# Patient Record
Sex: MALE | Race: WHITE | Employment: OTHER | URBAN - METROPOLITAN AREA
[De-identification: names, ages, dates, MRNs, and addresses within clinical notes are randomized per-mention and may not be internally consistent; named-entity substitution may affect disease eponyms.]

---

## 2019-12-18 ENCOUNTER — TELEPHONE (OUTPATIENT)
Dept: PULMONOLOGY | Facility: CLINIC | Age: 59
End: 2019-12-18

## 2019-12-18 NOTE — TELEPHONE ENCOUNTER
Pt to be added to Dr. Rodriguez Po schedule w/in 1 wk for cough, had outside CT showing lung mass, will need to bring disc of outside CT to appt, & pt referred by Dr. David Khan (p. #[phone]). Sched pt on 12/23 9:45 am [facility]. Left pt msg to call back.

## 2019-12-18 NOTE — TELEPHONE ENCOUNTER
Pt declined appt scheduled on 12/23 since his insurance doesn't kick in until 1/1/20. Per Dr. Elex Pallas pt may be added to his schedule at anytime. Pt accepted appt 1/3 3:45 pm [place].  Instructed him to bring outside imaging including CT disc & insurance card/in

## 2019-12-20 NOTE — TELEPHONE ENCOUNTER
Pt now requesting appt on Mon. Resched pt 12/23 9:30 am [facility] per his request. Explained there may be wait as he is being added to MD's schedule. Reminded him to bring his outside imaging (disc). He voiced understanding. AR- FYI only.

## 2019-12-23 ENCOUNTER — OFFICE VISIT (OUTPATIENT)
Dept: PULMONOLOGY | Facility: CLINIC | Age: 59
End: 2019-12-23

## 2019-12-23 VITALS
BODY MASS INDEX: 23.92 KG/M2 | HEART RATE: 67 BPM | DIASTOLIC BLOOD PRESSURE: 71 MMHG | WEIGHT: 130 LBS | HEIGHT: 62 IN | SYSTOLIC BLOOD PRESSURE: 123 MMHG

## 2019-12-23 DIAGNOSIS — R91.8 LUNG MASS: Primary | ICD-10-CM

## 2019-12-23 PROCEDURE — 99244 OFF/OP CNSLTJ NEW/EST MOD 40: CPT | Performed by: INTERNAL MEDICINE

## 2019-12-23 NOTE — PROGRESS NOTES
[facility] MEDICAL OFFICE BUILDING, [facility], [facility]    Report of Consultation    Hany Pettit Patient Status:  Outpatient    7/10/1952 MRN [id number]   Location [address] [place] [address] kg).    HEENT : at , nc , anicteric sclera   Neck : no LAINA and no stridor   Chest : Symmetrical expansion with chest wall with good air exchange to both lung fields              Clear to auscultation bilaterally with no crackles or wheezes or rhonchi    He

## 2020-01-02 ENCOUNTER — TELEPHONE (OUTPATIENT)
Dept: PULMONOLOGY | Facility: CLINIC | Age: 60
End: 2020-01-02

## 2020-01-02 NOTE — TELEPHONE ENCOUNTER
No insurance on file to obtain PA. Pt states he has new insurance as of 1/1/20, [facility] PPO. Pt informed he will need to update [facility] [phone] on insurance information so they can submit the PA for the PET scan.   Pt informed he most likely will n

## 2020-01-07 NOTE — TELEPHONE ENCOUNTER
The  lung mass does not look resectable also patient had multiple other possible metastatic mediastinal  Lymphadenopathy . And likely advanced malignancy . The PET scan to determine the best site for biopsy .   Please give the patient permission to have t

## 2020-01-07 NOTE — TELEPHONE ENCOUNTER
Hello,     Patient health plan is requesting additional clinical  information in ordering to make a decision for PET CT. Health Plan is needing to know the following;  Is a resection planned instead of biopsy if PET confirms limited disease, Is the PET CT t

## 2020-01-08 NOTE — TELEPHONE ENCOUNTER
PET/CT  Has been approved and patient has been informed. Thank you, Agustin Us Specialist    Managed Care.

## 2020-01-09 ENCOUNTER — HOSPITAL ENCOUNTER (OUTPATIENT)
Dept: NUCLEAR MEDICINE | Facility: HOSPITAL | Age: 60
Discharge: HOME | End: 2020-01-09
Attending: INTERNAL MEDICINE
Payer: COMMERCIAL

## 2020-01-09 ENCOUNTER — TELEPHONE (OUTPATIENT)
Dept: PULMONOLOGY | Facility: CLINIC | Age: 60
End: 2020-01-09

## 2020-01-09 DIAGNOSIS — R91.8 LUNG MASS: ICD-10-CM

## 2020-01-09 LAB — GLUCOSE BLDC GLUCOMTR-MCNC: 109 MG/DL (ref 70–99)

## 2020-01-09 PROCEDURE — 82962 GLUCOSE BLOOD TEST: CPT

## 2020-01-09 PROCEDURE — 78815 PET IMAGE W/CT SKULL-THIGH: CPT | Performed by: INTERNAL MEDICINE

## 2020-01-09 NOTE — TELEPHONE ENCOUNTER
Patient has appointment tomorrow with Mp Manning and wants to ensure that Pet Scan results will be in prior to tomorrow office visit. Please call as soon as possible at:[phone],thanks.

## 2020-01-10 ENCOUNTER — TELEPHONE (OUTPATIENT)
Dept: PULMONOLOGY | Facility: CLINIC | Age: 60
End: 2020-01-10

## 2020-01-13 NOTE — TELEPHONE ENCOUNTER
Spoke with Joseph. 70 who states order was passed on to Zulay Bradley to schedule pt for CT Lung Biopsy. No further actions to be taken at this time.

## 2020-01-14 NOTE — TELEPHONE ENCOUNTER
I tried to call pt , no answer   Pt will need to have this biopsy ASAP , he has cancer most likely ,  and he needs biopsy or he can go to another clinic .

## 2020-01-14 NOTE — TELEPHONE ENCOUNTER
Lacie Scheuermann in Radiology states pt is refusing biopsy at this time and said he will call Lacie Scheuermann back when he is ready to schedule.

## 2020-01-20 NOTE — TELEPHONE ENCOUNTER
Dr. Merly Levi, please see pended letter, review and sign off if okay. Certified letter can be sent once you sign off. Below discussed with Supervisor Lyudmila Figueroa.

## 2020-01-20 NOTE — TELEPHONE ENCOUNTER
I tried again to call the patient several times with no answer  Please send a letter to the patient regarding the absolute need of the biopsy for his lung mass which is representing lung cancer most likely  If patient continues to decline biopsy to send ce

## 2020-01-29 ENCOUNTER — TELEPHONE (OUTPATIENT)
Dept: PULMONOLOGY | Facility: CLINIC | Age: 60
End: 2020-01-29

## 2020-01-29 NOTE — TELEPHONE ENCOUNTER
I tried to call few times last few days with no answer  By Friday if no respond I will sign the letter and send it to the patient  Thank you very much

## 2020-02-05 ENCOUNTER — TELEPHONE (OUTPATIENT)
Dept: PULMONOLOGY | Facility: CLINIC | Age: 60
End: 2020-02-05

## 2020-02-05 NOTE — TELEPHONE ENCOUNTER
Managed Care- please see message below. Please work on PA for CT and contact pt when approved.  Thank you

## 2020-02-05 NOTE — TELEPHONE ENCOUNTER
Sister states per insurance PA is needed for CT Biopsy. Pt is scheduled to have CT 2/10/20.  Please call [phone]

## 2020-02-06 NOTE — TELEPHONE ENCOUNTER
Mai Blackman in [facility] states pt is scheduled for biopsy 2/10/20 10:00 am.

## 2020-02-06 NOTE — TELEPHONE ENCOUNTER
Good Morning see referral [id number] requires not auth    Any questions please contact us at [phone]    Thanks    eva- [facility] Care

## 2020-02-06 NOTE — IMAGING NOTE
CT BIOPSY INSTRUCTIONS GIVEN PER TEMPLATE, Q&A DONE, AGREES W/POC.  REINFORCED ARRIVE 0900, NO NSAIDS, BLOOD THINNERS, NPO 6 HRS,NEED RIDE HOME

## 2020-02-07 RX ORDER — FLUMAZENIL 0.1 MG/ML
0.2 INJECTION, SOLUTION INTRAVENOUS AS NEEDED
Status: DISCONTINUED | OUTPATIENT
Start: 2020-02-07 | End: 2020-02-12

## 2020-02-07 RX ORDER — SODIUM CHLORIDE 9 MG/ML
INJECTION, SOLUTION INTRAVENOUS CONTINUOUS
Status: DISCONTINUED | OUTPATIENT
Start: 2020-02-07 | End: 2020-02-12

## 2020-02-07 RX ORDER — MIDAZOLAM HYDROCHLORIDE 1 MG/ML
1 INJECTION INTRAMUSCULAR; INTRAVENOUS EVERY 5 MIN PRN
Status: DISCONTINUED | OUTPATIENT
Start: 2020-02-07 | End: 2020-02-12

## 2020-02-07 RX ORDER — NALOXONE HYDROCHLORIDE 0.4 MG/ML
80 INJECTION, SOLUTION INTRAMUSCULAR; INTRAVENOUS; SUBCUTANEOUS AS NEEDED
Status: DISCONTINUED | OUTPATIENT
Start: 2020-02-07 | End: 2020-02-12

## 2020-02-07 NOTE — TELEPHONE ENCOUNTER
Pt informed of Dr. Siddiqi Guest message below. Pt states he has an appt with Dr. Jaja Alexis Thursday next week and will call this office back after this to schedule f/u appt.

## 2020-02-10 ENCOUNTER — HOSPITAL ENCOUNTER (OUTPATIENT)
Dept: CT IMAGING | Facility: HOSPITAL | Age: 60
Discharge: HOME | End: 2020-02-10
Attending: INTERNAL MEDICINE
Payer: COMMERCIAL

## 2020-02-10 ENCOUNTER — HOSPITAL ENCOUNTER (OUTPATIENT)
Dept: GENERAL RADIOLOGY | Facility: HOSPITAL | Age: 60
Discharge: HOME | End: 2020-02-10
Attending: RADIOLOGY
Payer: COMMERCIAL

## 2020-02-10 VITALS
WEIGHT: 130.06 LBS | SYSTOLIC BLOOD PRESSURE: 118 MMHG | BODY MASS INDEX: 23.93 KG/M2 | HEIGHT: 62 IN | HEART RATE: 84 BPM | DIASTOLIC BLOOD PRESSURE: 65 MMHG | RESPIRATION RATE: 23 BRPM | OXYGEN SATURATION: 98 %

## 2020-02-10 DIAGNOSIS — R91.8 LUNG MASS: ICD-10-CM

## 2020-02-10 LAB
DEPRECATED RDW RBC AUTO: 41.1 FL (ref 35.1–46.3)
ERYTHROCYTE [DISTWIDTH] IN BLOOD BY AUTOMATED COUNT: 12.4 % (ref 11–15)
HCT VFR BLD AUTO: 42.2 % (ref 39–53)
HGB BLD-MCNC: 14 G/DL (ref 13–17.5)
MCH RBC QN AUTO: 29.8 PG (ref 26–34)
MCHC RBC AUTO-ENTMCNC: 33.2 G/DL (ref 31–37)
MCV RBC AUTO: 89.8 FL (ref 80–100)
PLATELET # BLD AUTO: 249 10(3)UL (ref 150–450)
RBC # BLD AUTO: 4.7 X10(6)UL (ref 4.3–5.7)
WBC # BLD AUTO: 8.7 X10(3) UL (ref 4–11)

## 2020-02-10 PROCEDURE — 99153 MOD SED SAME PHYS/QHP EA: CPT | Performed by: INTERNAL MEDICINE

## 2020-02-10 PROCEDURE — 88360 TUMOR IMMUNOHISTOCHEM/MANUAL: CPT | Performed by: INTERNAL MEDICINE

## 2020-02-10 PROCEDURE — 99152 MOD SED SAME PHYS/QHP 5/>YRS: CPT | Performed by: INTERNAL MEDICINE

## 2020-02-10 PROCEDURE — 77012 CT SCAN FOR NEEDLE BIOPSY: CPT | Performed by: INTERNAL MEDICINE

## 2020-02-10 PROCEDURE — 88305 TISSUE EXAM BY PATHOLOGIST: CPT | Performed by: INTERNAL MEDICINE

## 2020-02-10 PROCEDURE — 71045 X-RAY EXAM CHEST 1 VIEW: CPT | Performed by: RADIOLOGY

## 2020-02-10 PROCEDURE — 88342 IMHCHEM/IMCYTCHM 1ST ANTB: CPT | Performed by: INTERNAL MEDICINE

## 2020-02-10 PROCEDURE — 88333 PATH CONSLTJ SURG CYTO XM 1: CPT | Performed by: INTERNAL MEDICINE

## 2020-02-10 PROCEDURE — 85027 COMPLETE CBC AUTOMATED: CPT | Performed by: RADIOLOGY

## 2020-02-10 PROCEDURE — 36415 COLL VENOUS BLD VENIPUNCTURE: CPT

## 2020-02-10 PROCEDURE — 32405 CT BIOPSY LUNG OR MEDIASTINUM (CPT=77012/32405): CPT | Performed by: INTERNAL MEDICINE

## 2020-02-10 PROCEDURE — 88341 IMHCHEM/IMCYTCHM EA ADD ANTB: CPT | Performed by: INTERNAL MEDICINE

## 2020-02-10 PROCEDURE — 88334 PATH CONSLTJ SURG CYTO XM EA: CPT | Performed by: INTERNAL MEDICINE

## 2020-02-10 RX ORDER — MIDAZOLAM HYDROCHLORIDE 1 MG/ML
INJECTION INTRAMUSCULAR; INTRAVENOUS
Status: DISCONTINUED
Start: 2020-02-10 | End: 2020-02-10

## 2020-02-10 RX ADMIN — MIDAZOLAM HYDROCHLORIDE 2 MG: 1 INJECTION INTRAMUSCULAR; INTRAVENOUS at 10:04:00

## 2020-02-10 RX ADMIN — SODIUM CHLORIDE: 9 INJECTION, SOLUTION INTRAVENOUS at 09:52:00

## 2020-02-10 NOTE — H&P
History & Physical Examination    Patient Name: Cyrus Lima   MRN: [id number]  [facility]: [id number]  YOB: 1960    Diagnosis: Left upper lobe mass    Present Illness: 57-year-old, ex-smoker, with left upper lobe mass on CT, with hilar and Era Quesada  2/10/2020  9:26 AM

## 2020-02-10 NOTE — IVS NOTE
S/P CT guided Left Upper Lobe Lung Mass Biopsy. Tolerated procedure well. Denies any complaint of pain and discomfort. Biopsy site clean, dry and intact. Portable chest xray taken 1 hour post procedure. No pneumothorax seen.  Patient then was given discharge

## 2020-02-12 ENCOUNTER — OFFICE VISIT (OUTPATIENT)
Dept: HEMATOLOGY/ONCOLOGY | Facility: HOSPITAL | Age: 60
End: 2020-02-12
Attending: INTERNAL MEDICINE
Payer: COMMERCIAL

## 2020-02-12 ENCOUNTER — TELEPHONE (OUTPATIENT)
Dept: HEMATOLOGY/ONCOLOGY | Facility: HOSPITAL | Age: 60
End: 2020-02-12

## 2020-02-12 ENCOUNTER — TELEPHONE (OUTPATIENT)
Dept: PULMONOLOGY | Facility: CLINIC | Age: 60
End: 2020-02-12

## 2020-02-12 VITALS
SYSTOLIC BLOOD PRESSURE: 131 MMHG | TEMPERATURE: 98 F | BODY MASS INDEX: 24.74 KG/M2 | OXYGEN SATURATION: 100 % | RESPIRATION RATE: 16 BRPM | WEIGHT: 126 LBS | HEIGHT: 60 IN | HEART RATE: 68 BPM | DIASTOLIC BLOOD PRESSURE: 69 MMHG

## 2020-02-12 DIAGNOSIS — C34.92 SMALL CELL CARCINOMA OF LEFT LUNG (HCC): Primary | ICD-10-CM

## 2020-02-12 DIAGNOSIS — C34.90 SMALL CELL LUNG CARCINOMA (HCC): Primary | ICD-10-CM

## 2020-02-12 PROCEDURE — 99215 OFFICE O/P EST HI 40 MIN: CPT | Performed by: INTERNAL MEDICINE

## 2020-02-12 NOTE — TELEPHONE ENCOUNTER
----- Message from Hilda Slaughter MD sent at 2/11/2020  2:43 PM CST -----  I spoke to the patient and discussed with him and informed him about his biopsy results which showed small cell lung carcinoma  Please refer the patient to oncology Dr. Alex Salazar

## 2020-02-13 ENCOUNTER — HOSPITAL ENCOUNTER (OUTPATIENT)
Dept: MRI IMAGING | Facility: HOSPITAL | Age: 60
Discharge: HOME | End: 2020-02-13
Attending: INTERNAL MEDICINE
Payer: COMMERCIAL

## 2020-02-13 DIAGNOSIS — C34.92 SMALL CELL CARCINOMA OF LEFT LUNG (HCC): ICD-10-CM

## 2020-02-13 LAB — CREAT BLD-MCNC: 0.9 MG/DL (ref 0.7–1.3)

## 2020-02-13 PROCEDURE — 82565 ASSAY OF CREATININE: CPT

## 2020-02-13 PROCEDURE — A9575 INJ GADOTERATE MEGLUMI 0.1ML: HCPCS | Performed by: INTERNAL MEDICINE

## 2020-02-13 PROCEDURE — 70553 MRI BRAIN STEM W/O & W/DYE: CPT | Performed by: INTERNAL MEDICINE

## 2020-02-13 NOTE — CONSULTS
[facility]    PATIENT'S NAME: Dayo Wilkerson   CONSULTING PHYSICIAN: Pecolia Mins. Gilford Gauze, MD   PATIENT ACCOUNT #: [de-identified] LOCATION: [address] [place] [address] RECORD #: [id number] YOB: 1960   CONSULTATION DATE: 02/12/2020       [facility] clear.   NECK:  Supple. LUNGS:  Symmetric expansion, nonlabored breathing. HEART:  Good distal pulses. Regular rate and rhythm. ABDOMEN:  Soft, nontender, nondistended. No masses. No organomegaly. EXTREMITIES:  No edema.    MUSCULOSKELETAL:  No def

## 2020-02-15 ENCOUNTER — HOSPITAL ENCOUNTER (OUTPATIENT)
Dept: CT IMAGING | Facility: HOSPITAL | Age: 60
Discharge: HOME | End: 2020-02-15
Attending: INTERNAL MEDICINE
Payer: COMMERCIAL

## 2020-02-15 DIAGNOSIS — C34.92 SMALL CELL CARCINOMA OF LEFT LUNG (HCC): ICD-10-CM

## 2020-02-15 LAB — CREAT BLD-MCNC: 1.1 MG/DL (ref 0.7–1.3)

## 2020-02-15 PROCEDURE — 74177 CT ABD & PELVIS W/CONTRAST: CPT | Performed by: INTERNAL MEDICINE

## 2020-02-15 PROCEDURE — 71260 CT THORAX DX C+: CPT | Performed by: INTERNAL MEDICINE

## 2020-02-15 PROCEDURE — 82565 ASSAY OF CREATININE: CPT

## 2020-02-17 ENCOUNTER — OFFICE VISIT (OUTPATIENT)
Dept: HEMATOLOGY/ONCOLOGY | Facility: HOSPITAL | Age: 60
End: 2020-02-17
Attending: NURSE PRACTITIONER
Payer: COMMERCIAL

## 2020-02-17 ENCOUNTER — NURSE ONLY (OUTPATIENT)
Dept: HEMATOLOGY/ONCOLOGY | Facility: HOSPITAL | Age: 60
End: 2020-02-17
Attending: INTERNAL MEDICINE
Payer: COMMERCIAL

## 2020-02-17 ENCOUNTER — TELEPHONE (OUTPATIENT)
Dept: HEMATOLOGY/ONCOLOGY | Facility: HOSPITAL | Age: 60
End: 2020-02-17

## 2020-02-17 DIAGNOSIS — C34.92 SMALL CELL CARCINOMA OF LEFT LUNG (HCC): Primary | ICD-10-CM

## 2020-02-17 DIAGNOSIS — Z01.89 ENCOUNTER FOR LABORATORY EXAMINATION: ICD-10-CM

## 2020-02-17 DIAGNOSIS — C34.92 SMALL CELL CARCINOMA OF LEFT LUNG (HCC): ICD-10-CM

## 2020-02-17 DIAGNOSIS — Z00.6 EVALUATED FOR CLINICAL TRIAL ENROLLMENT: ICD-10-CM

## 2020-02-17 DIAGNOSIS — Z71.9 ENCOUNTER FOR EDUCATION: ICD-10-CM

## 2020-02-17 LAB
ALBUMIN SERPL-MCNC: 3.5 G/DL (ref 3.4–5)
ALBUMIN/GLOB SERPL: 0.9 {RATIO} (ref 1–2)
ALP LIVER SERPL-CCNC: 94 U/L (ref 45–117)
ALT SERPL-CCNC: 26 U/L (ref 16–61)
ANION GAP SERPL CALC-SCNC: 4 MMOL/L (ref 0–18)
AST SERPL-CCNC: 17 U/L (ref 15–37)
BASOPHILS # BLD AUTO: 0.07 X10(3) UL (ref 0–0.2)
BASOPHILS NFR BLD AUTO: 0.8 %
BILIRUB SERPL-MCNC: 0.6 MG/DL (ref 0.1–2)
BUN BLD-MCNC: 14 MG/DL (ref 7–18)
BUN/CREAT SERPL: 13.6 (ref 10–20)
CALCIUM BLD-MCNC: 9.4 MG/DL (ref 8.5–10.1)
CHLORIDE SERPL-SCNC: 103 MMOL/L (ref 98–112)
CO2 SERPL-SCNC: 30 MMOL/L (ref 21–32)
CREAT BLD-MCNC: 1.03 MG/DL (ref 0.7–1.3)
DEPRECATED RDW RBC AUTO: 40.4 FL (ref 35.1–46.3)
EOSINOPHIL # BLD AUTO: 0.2 X10(3) UL (ref 0–0.7)
EOSINOPHIL NFR BLD AUTO: 2.4 %
ERYTHROCYTE [DISTWIDTH] IN BLOOD BY AUTOMATED COUNT: 12.3 % (ref 11–15)
GLOBULIN PLAS-MCNC: 3.9 G/DL (ref 2.8–4.4)
GLUCOSE BLD-MCNC: 138 MG/DL (ref 70–99)
HCT VFR BLD AUTO: 41.9 % (ref 39–53)
HGB BLD-MCNC: 13.7 G/DL (ref 13–17.5)
IMM GRANULOCYTES # BLD AUTO: 0.03 X10(3) UL (ref 0–1)
IMM GRANULOCYTES NFR BLD: 0.4 %
LYMPHOCYTES # BLD AUTO: 1.4 X10(3) UL (ref 1–4)
LYMPHOCYTES NFR BLD AUTO: 16.6 %
M PROTEIN MFR SERPL ELPH: 7.4 G/DL (ref 6.4–8.2)
MCH RBC QN AUTO: 29.3 PG (ref 26–34)
MCHC RBC AUTO-ENTMCNC: 32.7 G/DL (ref 31–37)
MCV RBC AUTO: 89.7 FL (ref 80–100)
MONOCYTES # BLD AUTO: 0.72 X10(3) UL (ref 0.1–1)
MONOCYTES NFR BLD AUTO: 8.5 %
NEUTROPHILS # BLD AUTO: 6.02 X10 (3) UL (ref 1.5–7.7)
NEUTROPHILS # BLD AUTO: 6.02 X10(3) UL (ref 1.5–7.7)
NEUTROPHILS NFR BLD AUTO: 71.3 %
OSMOLALITY SERPL CALC.SUM OF ELEC: 287 MOSM/KG (ref 275–295)
PLATELET # BLD AUTO: 277 10(3)UL (ref 150–450)
POTASSIUM SERPL-SCNC: 4.4 MMOL/L (ref 3.5–5.1)
RBC # BLD AUTO: 4.67 X10(6)UL (ref 4.3–5.7)
SODIUM SERPL-SCNC: 137 MMOL/L (ref 136–145)
WBC # BLD AUTO: 8.4 X10(3) UL (ref 4–11)

## 2020-02-17 PROCEDURE — 85025 COMPLETE CBC W/AUTO DIFF WBC: CPT

## 2020-02-17 PROCEDURE — 36415 COLL VENOUS BLD VENIPUNCTURE: CPT

## 2020-02-17 PROCEDURE — 99215 OFFICE O/P EST HI 40 MIN: CPT | Performed by: NURSE PRACTITIONER

## 2020-02-17 PROCEDURE — 80053 COMPREHEN METABOLIC PANEL: CPT

## 2020-02-17 RX ORDER — ONDANSETRON HYDROCHLORIDE 8 MG/1
8 TABLET, FILM COATED ORAL EVERY 8 HOURS PRN
Qty: 50 TABLET | Refills: 2 | Status: SHIPPED | OUTPATIENT
Start: 2020-02-17 | End: 2020-02-25

## 2020-02-17 RX ORDER — SODIUM CHLORIDE 9 MG/ML
1000 INJECTION, SOLUTION INTRAVENOUS ONCE
Status: CANCELLED | OUTPATIENT
Start: 2020-02-19

## 2020-02-17 RX ORDER — PROCHLORPERAZINE MALEATE 10 MG
10 TABLET ORAL EVERY 6 HOURS PRN
Qty: 50 TABLET | Refills: 2 | Status: SHIPPED | OUTPATIENT
Start: 2020-02-17 | End: 2020-02-25

## 2020-02-17 NOTE — PATIENT INSTRUCTIONS
Medication Education Record: IV Therapy    Learner:  Patient    Barriers / Limitations:  None    Psychosocial Assessment:  patient psychosocial response appropriate    Diagnosis:   Small Cell Lung Cancer, limited stage    IV Cancer Treatment Name(s): Cispl prevent and minimize this from occurring.     Recommended Anti-nausea medications (as directed by your provider):  Prochloperazine (Compazine) 10 mg every 6 hours and Ondansetron (Zofran) 8 mg every 8 hours  Take as needed    Helpful hints during cancer berna sunlight.  o Wear a broad-spectrum sunscreen with an SPF of 30 or higher on any skin exposed to the sun. Re-apply every 2 hours if in the sun and after bathing or sweating. o For dry skin, use an alcohol-free lotion twice per day, especially after baths. or your family member is receiving chemotherapy, whether in the clinic or at home, the following precautions must be taken to lessen any exposure to the medication. Home Intravenous (IV) Medication:  1.  Make sure the connections of your IV tubing are t though (if possible) they should not clean up any of your body fluids after you have treatment. 4. Sexual activity is safe while throughout treatment.   It is possible that traces of the oral chemotherapy may be present in vaginal fluid or semen for 48 ho

## 2020-02-17 NOTE — TELEPHONE ENCOUNTER
Daniel Hunt, Darren's sister, called asking to speak with Sabra Squireslatoya to see if there was a way to arrange it so that she may be able to listen in on his education appointment on the phone because she [place] out of state.  Please call

## 2020-02-17 NOTE — PROGRESS NOTES
Medication Education Record: IV Therapy    Learner:  Patient and sister via phone call (per patient consent)    Barriers / Limitations:  None    Psychosocial Assessment:  patient psychosocial response appropriate    Diagnosis:   Small Cell Lung Cancer, sanchez any of these symptoms occur during or after the infusion  Allergic reaction: there is a chance for allergic reaction with some medications.   If your prescribed therapy has a higher risk for this, steps will be taken to prevent and minimize this from occurr take 2 tabs (4mg) every 4 hours as needed. Maximum of 8 tablets in 24 hours. o If you have persistent diarrhea or constipation, please contact the triage nurse for further instructions.   Skin Care  o Avoid direct sunlight.  o Wear a broad-spectrum sun the following link if you are interested in additional information about chemotherapy for yourself or family members: http://www.rubi.info/. html      Safety and Handling of Chemotherapy  While you or your family member is receiving c Please make appropriate arrangements. 2. Hugging and kissing is safe for you and your partner or family members. You can visit, sit with, hug and kiss the children in your life.     3. You can be around pregnant women, though (if possible) they should no

## 2020-02-18 ENCOUNTER — RESEARCH ENCOUNTER (OUTPATIENT)
Dept: HEMATOLOGY/ONCOLOGY | Facility: HOSPITAL | Age: 60
End: 2020-02-18

## 2020-02-18 ENCOUNTER — NURSE ONLY (OUTPATIENT)
Dept: HEMATOLOGY/ONCOLOGY | Facility: HOSPITAL | Age: 60
End: 2020-02-18
Attending: INTERNAL MEDICINE
Payer: COMMERCIAL

## 2020-02-18 ENCOUNTER — TELEPHONE (OUTPATIENT)
Dept: HEMATOLOGY/ONCOLOGY | Facility: HOSPITAL | Age: 60
End: 2020-02-18

## 2020-02-18 VITALS
OXYGEN SATURATION: 99 % | DIASTOLIC BLOOD PRESSURE: 63 MMHG | SYSTOLIC BLOOD PRESSURE: 135 MMHG | TEMPERATURE: 98 F | RESPIRATION RATE: 16 BRPM | HEART RATE: 78 BPM

## 2020-02-18 DIAGNOSIS — C34.92 SMALL CELL CARCINOMA OF LEFT LUNG (HCC): Primary | ICD-10-CM

## 2020-02-18 PROCEDURE — 96366 THER/PROPH/DIAG IV INF ADDON: CPT

## 2020-02-18 PROCEDURE — 96361 HYDRATE IV INFUSION ADD-ON: CPT

## 2020-02-18 PROCEDURE — 96417 CHEMO IV INFUS EACH ADDL SEQ: CPT

## 2020-02-18 PROCEDURE — 96413 CHEMO IV INFUSION 1 HR: CPT

## 2020-02-18 PROCEDURE — 96367 TX/PROPH/DG ADDL SEQ IV INF: CPT

## 2020-02-18 RX ORDER — SODIUM CHLORIDE 9 MG/ML
1000 INJECTION, SOLUTION INTRAVENOUS ONCE
Status: COMPLETED | OUTPATIENT
Start: 2020-02-18 | End: 2020-02-18

## 2020-02-18 RX ADMIN — SODIUM CHLORIDE 1000 ML: 9 INJECTION, SOLUTION INTRAVENOUS at 12:02:00

## 2020-02-18 NOTE — PROGRESS NOTES
Pt here for C1D1 Cisplatin Etoposide. Arrives Ambulating independently, accompanied by Self           Patient reports possible pregnancy since last therapy cycle: Not Applicable    Modifications in dose or schedule: No. Consent noted in media tab.      PIV

## 2020-02-18 NOTE — TELEPHONE ENCOUNTER
Annabella Baeza called saying that Cosmo Robertson is no longer going to [facility] for a second opinion, so they do not need his biopsy disc sent there.

## 2020-02-18 NOTE — PATIENT INSTRUCTIONS
Recommended Anti-nausea medications (as directed by your provider):  Prochloperazine (Compazine) 10 mg every 6 hours and Ondansetron (Zofran) 8 mg every 8 hours (last administered via IV at 0830)  Take as needed     Helpful hints during cancer treatment: Care  · Avoid direct sunlight. · Wear a broad-spectrum sunscreen with an SPF of 30 or higher on any skin exposed to the sun. Re-apply every 2 hours if in the sun and after bathing or sweating.   · For dry skin, use an alcohol-free lotion twice per day, es of Chemotherapy  While you or your family member is receiving chemotherapy, whether in the clinic or at home, the following precautions must be taken to lessen any exposure to the medication. Handling Body Waste:   1.  Caregivers must wear gloves if exp can have harmful side effects to the fetus, especially in the first trimester.   In addition, menstrual cycles can become irregular during and after treatment, so you may not know if you are at a time in your cycle when you could become pregnant or if you a

## 2020-02-19 ENCOUNTER — RESEARCH ENCOUNTER (OUTPATIENT)
Dept: HEMATOLOGY/ONCOLOGY | Facility: HOSPITAL | Age: 60
End: 2020-02-19

## 2020-02-19 ENCOUNTER — OFFICE VISIT (OUTPATIENT)
Dept: HEMATOLOGY/ONCOLOGY | Facility: HOSPITAL | Age: 60
End: 2020-02-19
Attending: INTERNAL MEDICINE
Payer: COMMERCIAL

## 2020-02-19 ENCOUNTER — TELEPHONE (OUTPATIENT)
Dept: HEMATOLOGY/ONCOLOGY | Facility: HOSPITAL | Age: 60
End: 2020-02-19

## 2020-02-19 VITALS
OXYGEN SATURATION: 100 % | HEART RATE: 63 BPM | DIASTOLIC BLOOD PRESSURE: 65 MMHG | RESPIRATION RATE: 16 BRPM | TEMPERATURE: 98 F | SYSTOLIC BLOOD PRESSURE: 120 MMHG

## 2020-02-19 DIAGNOSIS — C34.92 SMALL CELL CARCINOMA OF LEFT LUNG (HCC): Primary | ICD-10-CM

## 2020-02-19 PROCEDURE — 96413 CHEMO IV INFUSION 1 HR: CPT

## 2020-02-19 PROCEDURE — 96375 TX/PRO/DX INJ NEW DRUG ADDON: CPT

## 2020-02-19 NOTE — PROGRESS NOTES
Study: Arya Weber  Potential candidate    Met with patient in infusion room to discuss interest in participation in clinical trial NRG-, Limited stage small cell lung cancer (LS-SCLC):  A Phase II/III randomized study of chemoradiation vs chemoradiation

## 2020-02-19 NOTE — PROGRESS NOTES
Pt here for C1D2 Etoposide. Arrives Ambulating independently, accompanied by Self           Patient reports possible pregnancy since last therapy cycle: Not Applicable    Modifications in dose or schedule: No.     Patient states he is feeling well today. Limitations:  Fatigue  Method:  Brief focused, Discussion, Printed material and Reinforcement  Outcome:  Shows understanding  Comments:

## 2020-02-20 ENCOUNTER — OFFICE VISIT (OUTPATIENT)
Dept: HEMATOLOGY/ONCOLOGY | Facility: HOSPITAL | Age: 60
End: 2020-02-20
Attending: INTERNAL MEDICINE
Payer: COMMERCIAL

## 2020-02-20 ENCOUNTER — RESEARCH ENCOUNTER (OUTPATIENT)
Dept: HEMATOLOGY/ONCOLOGY | Facility: HOSPITAL | Age: 60
End: 2020-02-20

## 2020-02-20 VITALS
SYSTOLIC BLOOD PRESSURE: 113 MMHG | HEART RATE: 56 BPM | TEMPERATURE: 97 F | DIASTOLIC BLOOD PRESSURE: 99 MMHG | RESPIRATION RATE: 16 BRPM

## 2020-02-20 DIAGNOSIS — Z01.89 ENCOUNTER FOR LABORATORY EXAMINATION: ICD-10-CM

## 2020-02-20 DIAGNOSIS — C34.92 SMALL CELL CARCINOMA OF LEFT LUNG (HCC): Primary | ICD-10-CM

## 2020-02-20 DIAGNOSIS — Z00.6 EVALUATED FOR CLINICAL TRIAL ENROLLMENT: Primary | ICD-10-CM

## 2020-02-20 PROCEDURE — 96367 TX/PROPH/DG ADDL SEQ IV INF: CPT

## 2020-02-20 PROCEDURE — 96413 CHEMO IV INFUSION 1 HR: CPT

## 2020-02-20 NOTE — PROGRESS NOTES
Pt here for C1D3 Etoposide.   Arrives Ambulating independently, accompanied by Self           Patient reports possible pregnancy since last therapy cycle: Not Applicable    Modifications in dose or schedule: No.     Patient states he is feeling \"ok\" today Printed material and Reinforcement  Outcome:  Shows understanding  Comments:

## 2020-02-20 NOTE — PROGRESS NOTES
STUDY:   Consent Note    Patient here for day 3 of pre-treatment cycle chemo. Reviewed study and addressed questions, concerns. Patient verbalized good understanding and willingness to participate. Signed consent, version date 11/26/2019 and HIPPA.

## 2020-02-21 ENCOUNTER — OFFICE VISIT (OUTPATIENT)
Dept: RADIATION ONCOLOGY | Facility: HOSPITAL | Age: 60
End: 2020-02-21
Attending: INTERNAL MEDICINE
Payer: COMMERCIAL

## 2020-02-21 ENCOUNTER — NURSE ONLY (OUTPATIENT)
Dept: HEMATOLOGY/ONCOLOGY | Facility: HOSPITAL | Age: 60
End: 2020-02-21
Attending: INTERNAL MEDICINE
Payer: COMMERCIAL

## 2020-02-21 ENCOUNTER — RESEARCH ENCOUNTER (OUTPATIENT)
Dept: HEMATOLOGY/ONCOLOGY | Facility: HOSPITAL | Age: 60
End: 2020-02-21

## 2020-02-21 VITALS
RESPIRATION RATE: 18 BRPM | BODY MASS INDEX: 25.36 KG/M2 | SYSTOLIC BLOOD PRESSURE: 104 MMHG | HEIGHT: 60 IN | DIASTOLIC BLOOD PRESSURE: 62 MMHG | TEMPERATURE: 97 F | WEIGHT: 129.19 LBS | HEART RATE: 72 BPM

## 2020-02-21 DIAGNOSIS — Z01.89 ENCOUNTER FOR LABORATORY EXAMINATION: ICD-10-CM

## 2020-02-21 DIAGNOSIS — Z00.6 EVALUATED FOR CLINICAL TRIAL ENROLLMENT: ICD-10-CM

## 2020-02-21 DIAGNOSIS — C34.92 SMALL CELL CARCINOMA OF LEFT LUNG (HCC): Primary | ICD-10-CM

## 2020-02-21 LAB
HBV CORE AB SERPL QL IA: NONREACTIVE
HBV SURFACE AB SER QL: NONREACTIVE
HBV SURFACE AB SERPL IA-ACNC: <3.1 MIU/ML
HBV SURFACE AG SER-ACNC: <0.1 [IU]/L
HBV SURFACE AG SERPL QL IA: NONREACTIVE
HCV AB SERPL QL IA: NONREACTIVE

## 2020-02-21 PROCEDURE — 86704 HEP B CORE ANTIBODY TOTAL: CPT

## 2020-02-21 PROCEDURE — 80500 HEPATITIS B PROFILE: CPT

## 2020-02-21 PROCEDURE — 87340 HEPATITIS B SURFACE AG IA: CPT

## 2020-02-21 PROCEDURE — 86803 HEPATITIS C AB TEST: CPT

## 2020-02-21 PROCEDURE — 99212 OFFICE O/P EST SF 10 MIN: CPT

## 2020-02-21 PROCEDURE — 86706 HEP B SURFACE ANTIBODY: CPT

## 2020-02-21 PROCEDURE — 36415 COLL VENOUS BLD VENIPUNCTURE: CPT

## 2020-02-21 NOTE — CONSULTS
[facility]    PATIENT'S NAME: Fam Schneider   RADIATION ONCOLOGIST: Kiel Garcia.  Anum Lyn MD   PATIENT ACCOUNT #: [de-identified] LOCATION: [address] [place] [address] RECORD #: [id number] YOB: 1960   CONSULTATION DATE: 02/21/2020 some patient-mediated delays, but ultimately a CT-guided biopsy of the lung mass took place on 02/10/2020. The pathology from this procedure demonstrated small-cell carcinoma.   The patient was referred to Dr. 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 He denies any transportation-related difficulties. REVIEW OF SYSTEMS:  A 14-point review of systems was performed. Pertinent positives and negatives are as per HPI.         PHYSICAL EXAMINATION:    GENERAL:  A 59 sense to enroll the patient on this trial.  From a radiotherapy standpoint, the patient will receive 6600 cGy in 33 fractions. This is done utilizing IMRT and IGRT as per standard NCCN approved treatment.     I had a long talk with the patient regarding th prophylactic cranial irradiation and I would gave 2500 cGy in 10 fractions. I did go through some of the side effects of this treatment in brief, but will discuss this more with the patient in significant detail if and when we reach that point.   He was ap

## 2020-02-21 NOTE — PROGRESS NOTES
Nursing Consultation Note  Patient: Rio Whitaker. YOB: 1960  Age: 62year old  Radiation Oncologist: Dr. Rogene Runner  Referring Physician: Kyle Quinones  Diagnosis:No diagnosis found.   Consult Date: 2/21/2020      Chemotherap [id number]  Phone: [phone] Fax: [phone]      Past Medical History:   Diagnosis Date   • Cancer [facility])     Lung    • Lung cancer ([facility] 01/2020   • Shortness of breath        History reviewed. No pertinent surgical history.     Social History    Soci medication refill/last taken today  Service: Not Asked        Blood Transfusions: Not Asked        Caffeine Concern: Yes          1 cup of coffee daily        Occupational Exposure: Not Asked        Hobby Hazards: Not Asked        Sleep Concern: Not Asked        Stress Concern: Not Discussion, Written and Handout  Barriers to learning:  None  Interventions to reduce barriers:  Consult, Face patient when speaking, Provide support/encouragement, Provide printed materials and Patient to express feelings  Visual aids:  yes  Hearing disab follow patient    Expected Outcomes:  Minimize weight loss  Knowledge of care plan    Progress Toward Outcome:  Making progress    Pamphlets/Handouts Given to Patient:  Nutritional information  Pt states his sister is a nutritionist.

## 2020-02-21 NOTE — PROGRESS NOTES
STUDY: NRG-  RANDOMIZATION NOTE    Patient had hepatitis labs drawn this am , then consult with radiation oncology. After reviewing all criteria, patient is eligible for study and therefore registered today.    Randomized to ARM 2- Concurrent Platinu

## 2020-02-24 ENCOUNTER — APPOINTMENT (OUTPATIENT)
Dept: RADIATION ONCOLOGY | Facility: HOSPITAL | Age: 60
End: 2020-02-24
Attending: INTERNAL MEDICINE
Payer: COMMERCIAL

## 2020-02-24 ENCOUNTER — HOSPITAL ENCOUNTER (OUTPATIENT)
Dept: RESPIRATORY THERAPY | Facility: HOSPITAL | Age: 60
Discharge: HOME | End: 2020-02-24
Attending: INTERNAL MEDICINE
Payer: COMMERCIAL

## 2020-02-24 DIAGNOSIS — C34.92 SMALL CELL CARCINOMA OF LEFT LUNG (HCC): ICD-10-CM

## 2020-02-24 PROCEDURE — 77399 UNLISTED PX MED RADJ PHYSICS: CPT | Performed by: RADIOLOGY

## 2020-02-24 PROCEDURE — 94060 EVALUATION OF WHEEZING: CPT | Performed by: INTERNAL MEDICINE

## 2020-02-24 PROCEDURE — 94729 DIFFUSING CAPACITY: CPT | Performed by: INTERNAL MEDICINE

## 2020-02-24 PROCEDURE — 77334 RADIATION TREATMENT AID(S): CPT | Performed by: RADIOLOGY

## 2020-02-24 PROCEDURE — 94726 PLETHYSMOGRAPHY LUNG VOLUMES: CPT | Performed by: INTERNAL MEDICINE

## 2020-02-24 PROCEDURE — 77470 SPECIAL RADIATION TREATMENT: CPT | Performed by: RADIOLOGY

## 2020-02-25 NOTE — PROCEDURES
[address]      1960 MRN [id number]   Height  610 Age 62year old   Weight  129 lbs  Sex Male         Spirometry:     FEV1 1.99 L   79 %   FEV1 / FVC 65 % which is reduced   No signi

## 2020-02-27 PROCEDURE — 77293 RESPIRATOR MOTION MGMT SIMUL: CPT | Performed by: RADIOLOGY

## 2020-02-27 PROCEDURE — 77338 DESIGN MLC DEVICE FOR IMRT: CPT | Performed by: RADIOLOGY

## 2020-02-27 PROCEDURE — 77301 RADIOTHERAPY DOSE PLAN IMRT: CPT | Performed by: RADIOLOGY

## 2020-02-27 PROCEDURE — 77300 RADIATION THERAPY DOSE PLAN: CPT | Performed by: RADIOLOGY

## 2020-02-28 ENCOUNTER — TELEPHONE (OUTPATIENT)
Dept: HEMATOLOGY/ONCOLOGY | Facility: HOSPITAL | Age: 60
End: 2020-02-28

## 2020-02-28 NOTE — TELEPHONE ENCOUNTER
Called Sandra Radford answered questions about port and exercise(he will get back to us on Monday on decision about having port and making those arrangements). He also was wondering about the suppliments he asked Jevon Lindsay about.  I let him know she is researching wi

## 2020-03-01 ENCOUNTER — APPOINTMENT (OUTPATIENT)
Dept: RADIATION ONCOLOGY | Facility: HOSPITAL | Age: 60
End: 2020-03-01
Attending: INTERNAL MEDICINE
Payer: COMMERCIAL

## 2020-03-02 ENCOUNTER — TELEPHONE (OUTPATIENT)
Dept: HEMATOLOGY/ONCOLOGY | Facility: HOSPITAL | Age: 60
End: 2020-03-02

## 2020-03-02 NOTE — TELEPHONE ENCOUNTER
Yaneth Coleman called and was looking to speak with somebody to go over getting his port placed he asked that he please get a call back to go over this.

## 2020-03-04 PROBLEM — Z45.2 ENCOUNTER FOR CARE RELATED TO VASCULAR ACCESS PORT: Status: ACTIVE | Noted: 2020-03-04

## 2020-03-05 ENCOUNTER — TELEPHONE (OUTPATIENT)
Dept: HEMATOLOGY/ONCOLOGY | Facility: HOSPITAL | Age: 60
End: 2020-03-05

## 2020-03-05 NOTE — TELEPHONE ENCOUNTER
LVM to schedule f/u    patient needs a follow up appointment on 3/17 and 3/24 with Noemy Yu, patient prefers late morning

## 2020-03-06 ENCOUNTER — HOSPITAL ENCOUNTER (OUTPATIENT)
Dept: INTERVENTIONAL RADIOLOGY/VASCULAR | Facility: HOSPITAL | Age: 60
Discharge: HOME | End: 2020-03-06
Attending: INTERNAL MEDICINE | Admitting: RADIOLOGY
Payer: COMMERCIAL

## 2020-03-06 VITALS
SYSTOLIC BLOOD PRESSURE: 94 MMHG | BODY MASS INDEX: 23.21 KG/M2 | OXYGEN SATURATION: 95 % | HEIGHT: 62 IN | HEART RATE: 63 BPM | WEIGHT: 126.13 LBS | DIASTOLIC BLOOD PRESSURE: 58 MMHG | RESPIRATION RATE: 16 BRPM

## 2020-03-06 DIAGNOSIS — C34.92 SMALL CELL CARCINOMA OF LEFT LUNG (HCC): ICD-10-CM

## 2020-03-06 PROCEDURE — 77001 FLUOROGUIDE FOR VEIN DEVICE: CPT

## 2020-03-06 PROCEDURE — 99152 MOD SED SAME PHYS/QHP 5/>YRS: CPT

## 2020-03-06 PROCEDURE — 36415 COLL VENOUS BLD VENIPUNCTURE: CPT

## 2020-03-06 PROCEDURE — 99153 MOD SED SAME PHYS/QHP EA: CPT

## 2020-03-06 PROCEDURE — 0JH60XZ INSERTION OF TUNNELED VASCULAR ACCESS DEVICE INTO CHEST SUBCUTANEOUS TISSUE AND FASCIA, OPEN APPROACH: ICD-10-PCS | Performed by: RADIOLOGY

## 2020-03-06 PROCEDURE — 02HV33Z INSERTION OF INFUSION DEVICE INTO SUPERIOR VENA CAVA, PERCUTANEOUS APPROACH: ICD-10-PCS | Performed by: RADIOLOGY

## 2020-03-06 PROCEDURE — B518ZZA FLUOROSCOPY OF SUPERIOR VENA CAVA, GUIDANCE: ICD-10-PCS | Performed by: RADIOLOGY

## 2020-03-06 PROCEDURE — 36561 INSERT TUNNELED CV CATH: CPT

## 2020-03-06 RX ORDER — BACITRACIN 50000 [USP'U]/1
INJECTION, POWDER, LYOPHILIZED, FOR SOLUTION INTRAMUSCULAR
Status: COMPLETED
Start: 2020-03-06 | End: 2020-03-06

## 2020-03-06 RX ORDER — LIDOCAINE HYDROCHLORIDE 20 MG/ML
INJECTION, SOLUTION EPIDURAL; INFILTRATION; INTRACAUDAL; PERINEURAL
Status: COMPLETED
Start: 2020-03-06 | End: 2020-03-06

## 2020-03-06 RX ORDER — CEFAZOLIN SODIUM/WATER 2 G/20 ML
SYRINGE (ML) INTRAVENOUS
Status: COMPLETED
Start: 2020-03-06 | End: 2020-03-06

## 2020-03-06 RX ORDER — SODIUM CHLORIDE 9 MG/ML
INJECTION, SOLUTION INTRAVENOUS CONTINUOUS
Status: DISCONTINUED | OUTPATIENT
Start: 2020-03-06 | End: 2020-03-06

## 2020-03-06 RX ORDER — MIDAZOLAM HYDROCHLORIDE 1 MG/ML
INJECTION INTRAMUSCULAR; INTRAVENOUS
Status: COMPLETED
Start: 2020-03-06 | End: 2020-03-06

## 2020-03-06 RX ORDER — HEPARIN SODIUM (PORCINE) LOCK FLUSH IV SOLN 100 UNIT/ML 100 UNIT/ML
SOLUTION INTRAVENOUS
Status: COMPLETED
Start: 2020-03-06 | End: 2020-03-06

## 2020-03-06 NOTE — INTERVAL H&P NOTE
The above referenced H&P was reviewed by Pretty Rao MD on 3/6/2020, the patient was examined and no significant changes have occurred in the patient's condition since the H&P was performed.   Risks, benefits, alternative treatments and consequences of no

## 2020-03-09 ENCOUNTER — NURSE ONLY (OUTPATIENT)
Dept: HEMATOLOGY/ONCOLOGY | Facility: HOSPITAL | Age: 60
End: 2020-03-09
Attending: INTERNAL MEDICINE
Payer: COMMERCIAL

## 2020-03-09 ENCOUNTER — TELEPHONE (OUTPATIENT)
Dept: HEMATOLOGY/ONCOLOGY | Facility: HOSPITAL | Age: 60
End: 2020-03-09

## 2020-03-09 VITALS
RESPIRATION RATE: 18 BRPM | HEART RATE: 61 BPM | DIASTOLIC BLOOD PRESSURE: 63 MMHG | TEMPERATURE: 98 F | SYSTOLIC BLOOD PRESSURE: 122 MMHG | HEIGHT: 60 IN | WEIGHT: 132.06 LBS | BODY MASS INDEX: 25.93 KG/M2 | OXYGEN SATURATION: 97 %

## 2020-03-09 DIAGNOSIS — Z51.11 CHEMOTHERAPY MANAGEMENT, ENCOUNTER FOR: ICD-10-CM

## 2020-03-09 DIAGNOSIS — C34.90 SMALL CELL LUNG CANCER IN ADULT (HCC): Primary | ICD-10-CM

## 2020-03-09 DIAGNOSIS — Z00.6 CLINICAL TRIAL EXAM: ICD-10-CM

## 2020-03-09 DIAGNOSIS — C34.90 SMALL CELL LUNG CANCER IN ADULT (HCC): ICD-10-CM

## 2020-03-09 DIAGNOSIS — D70.1 CHEMOTHERAPY INDUCED NEUTROPENIA (HCC): ICD-10-CM

## 2020-03-09 DIAGNOSIS — C34.92 SMALL CELL CARCINOMA OF LEFT LUNG (HCC): Primary | ICD-10-CM

## 2020-03-09 DIAGNOSIS — T45.1X5A CHEMOTHERAPY INDUCED NEUTROPENIA (HCC): ICD-10-CM

## 2020-03-09 LAB
ALBUMIN SERPL-MCNC: 3.4 G/DL (ref 3.4–5)
ALBUMIN/GLOB SERPL: 0.9 {RATIO} (ref 1–2)
ALP LIVER SERPL-CCNC: 107 U/L (ref 45–117)
ALT SERPL-CCNC: 36 U/L (ref 16–61)
ANION GAP SERPL CALC-SCNC: 6 MMOL/L (ref 0–18)
AST SERPL-CCNC: 20 U/L (ref 15–37)
BASOPHILS # BLD AUTO: 0.06 X10(3) UL (ref 0–0.2)
BASOPHILS NFR BLD AUTO: 1.7 %
BILIRUB SERPL-MCNC: 0.3 MG/DL (ref 0.1–2)
BUN BLD-MCNC: 15 MG/DL (ref 7–18)
BUN/CREAT SERPL: 14.4 (ref 10–20)
CALCIUM BLD-MCNC: 9.2 MG/DL (ref 8.5–10.1)
CHLORIDE SERPL-SCNC: 102 MMOL/L (ref 98–112)
CO2 SERPL-SCNC: 31 MMOL/L (ref 21–32)
CREAT BLD-MCNC: 1.04 MG/DL (ref 0.7–1.3)
DEPRECATED RDW RBC AUTO: 44.1 FL (ref 35.1–46.3)
EOSINOPHIL # BLD AUTO: 0.14 X10(3) UL (ref 0–0.7)
EOSINOPHIL NFR BLD AUTO: 4.1 %
ERYTHROCYTE [DISTWIDTH] IN BLOOD BY AUTOMATED COUNT: 13.3 % (ref 11–15)
GLOBULIN PLAS-MCNC: 3.8 G/DL (ref 2.8–4.4)
GLUCOSE BLD-MCNC: 147 MG/DL (ref 70–99)
HCT VFR BLD AUTO: 42 % (ref 39–53)
HGB BLD-MCNC: 13.5 G/DL (ref 13–17.5)
IMM GRANULOCYTES # BLD AUTO: 0.12 X10(3) UL (ref 0–1)
IMM GRANULOCYTES NFR BLD: 3.5 %
LYMPHOCYTES # BLD AUTO: 1.18 X10(3) UL (ref 1–4)
LYMPHOCYTES NFR BLD AUTO: 34.2 %
M PROTEIN MFR SERPL ELPH: 7.2 G/DL (ref 6.4–8.2)
MCH RBC QN AUTO: 29.6 PG (ref 26–34)
MCHC RBC AUTO-ENTMCNC: 32.1 G/DL (ref 31–37)
MCV RBC AUTO: 92.1 FL (ref 80–100)
MONOCYTES # BLD AUTO: 0.72 X10(3) UL (ref 0.1–1)
MONOCYTES NFR BLD AUTO: 20.9 %
NEUTROPHILS # BLD AUTO: 1.23 X10 (3) UL (ref 1.5–7.7)
NEUTROPHILS # BLD AUTO: 1.23 X10(3) UL (ref 1.5–7.7)
NEUTROPHILS NFR BLD AUTO: 35.6 %
OSMOLALITY SERPL CALC.SUM OF ELEC: 292 MOSM/KG (ref 275–295)
PATIENT FASTING Y/N/NP: NO
PLATELET # BLD AUTO: 335 10(3)UL (ref 150–450)
POTASSIUM SERPL-SCNC: 4.4 MMOL/L (ref 3.5–5.1)
RBC # BLD AUTO: 4.56 X10(6)UL (ref 4.3–5.7)
SODIUM SERPL-SCNC: 139 MMOL/L (ref 136–145)
TSI SER-ACNC: 2.37 MIU/ML (ref 0.36–3.74)
WBC # BLD AUTO: 3.5 X10(3) UL (ref 4–11)

## 2020-03-09 PROCEDURE — 84443 ASSAY THYROID STIM HORMONE: CPT

## 2020-03-09 PROCEDURE — 36415 COLL VENOUS BLD VENIPUNCTURE: CPT

## 2020-03-09 PROCEDURE — 99215 OFFICE O/P EST HI 40 MIN: CPT | Performed by: INTERNAL MEDICINE

## 2020-03-09 PROCEDURE — 80053 COMPREHEN METABOLIC PANEL: CPT

## 2020-03-09 PROCEDURE — 85025 COMPLETE CBC W/AUTO DIFF WBC: CPT

## 2020-03-09 RX ORDER — SODIUM CHLORIDE 9 MG/ML
1000 INJECTION, SOLUTION INTRAVENOUS ONCE
Status: CANCELLED | OUTPATIENT
Start: 2020-03-10

## 2020-03-09 NOTE — TELEPHONE ENCOUNTER
Kerry Shetty is calling for supplements and vitamins and minerals. Please call Glenny to discuss this matter.

## 2020-03-09 NOTE — TELEPHONE ENCOUNTER
Returned call to Darrell Overton reiterated what was discussed in the teaching today that Dr. Yoli Small recommends that patient stops all supplements and vitamins and minerals while he is under treatment. Darrell Overton stated understanding.

## 2020-03-09 NOTE — PROGRESS NOTES
[facility] Progress Note    Patient Name: Laurie Gonzalez YOB: 1960   Medical Record Number: [id number]   Attending Physician: Rajwinder Moran M.D.      Chief Complaint:  Small cell lung cancer    History of Present Illness:  Cancer h date: 2000        Years since quittin.1      Smokeless tobacco: Never Used    Substance and Sexual Activity      Alcohol use:  Yes        Alcohol/week: 7.0 standard drinks        Types: 7 Cans of beer per week        Frequency: 4 or more times a we chemo-infusion, 100 mg/m2 (Treatment Plan Recorded), Intravenous, Once, Kyle Bautista MD, Stopped at 02/19/20 0916  [COMPLETED] Fosaprepitant Dimeglumine (EMEND) 150 mg in sodium chloride 0.9% 150 mL IVPB, 150 mg, Intravenous, Once, Kyle Bautista MD, Sto palpable lymphadenopathy. Neck is supple. Lymphatics: There is no palpable peripheral lymphadenopathy   Chest: Symmetric expansion, nonlabored breathing  Cardiovascular: Regular with palpable distal pulses   Abdomen: Soft, non tender.    Extremities: No ed

## 2020-03-10 ENCOUNTER — OFFICE VISIT (OUTPATIENT)
Dept: RADIATION ONCOLOGY | Facility: HOSPITAL | Age: 60
End: 2020-03-10
Attending: RADIOLOGY
Payer: COMMERCIAL

## 2020-03-10 ENCOUNTER — NURSE ONLY (OUTPATIENT)
Dept: HEMATOLOGY/ONCOLOGY | Facility: HOSPITAL | Age: 60
End: 2020-03-10
Attending: INTERNAL MEDICINE
Payer: COMMERCIAL

## 2020-03-10 ENCOUNTER — RESEARCH ENCOUNTER (OUTPATIENT)
Dept: HEMATOLOGY/ONCOLOGY | Facility: HOSPITAL | Age: 60
End: 2020-03-10

## 2020-03-10 ENCOUNTER — APPOINTMENT (OUTPATIENT)
Dept: RADIATION ONCOLOGY | Facility: HOSPITAL | Age: 60
End: 2020-03-10
Attending: INTERNAL MEDICINE
Payer: COMMERCIAL

## 2020-03-10 VITALS
RESPIRATION RATE: 18 BRPM | TEMPERATURE: 98 F | OXYGEN SATURATION: 99 % | DIASTOLIC BLOOD PRESSURE: 60 MMHG | HEART RATE: 71 BPM | SYSTOLIC BLOOD PRESSURE: 115 MMHG

## 2020-03-10 DIAGNOSIS — C34.92 SMALL CELL CARCINOMA OF LEFT LUNG (HCC): Primary | ICD-10-CM

## 2020-03-10 DIAGNOSIS — Z45.2 ENCOUNTER FOR CARE RELATED TO VASCULAR ACCESS PORT: ICD-10-CM

## 2020-03-10 PROCEDURE — 96367 TX/PROPH/DG ADDL SEQ IV INF: CPT

## 2020-03-10 PROCEDURE — 77386 HC IMRT COMPLEX: CPT | Performed by: RADIOLOGY

## 2020-03-10 PROCEDURE — 96375 TX/PRO/DX INJ NEW DRUG ADDON: CPT

## 2020-03-10 PROCEDURE — 96366 THER/PROPH/DIAG IV INF ADDON: CPT

## 2020-03-10 PROCEDURE — 96413 CHEMO IV INFUSION 1 HR: CPT

## 2020-03-10 PROCEDURE — 96417 CHEMO IV INFUS EACH ADDL SEQ: CPT

## 2020-03-10 PROCEDURE — 96361 HYDRATE IV INFUSION ADD-ON: CPT

## 2020-03-10 RX ORDER — HEPARIN SODIUM (PORCINE) LOCK FLUSH IV SOLN 100 UNIT/ML 100 UNIT/ML
SOLUTION INTRAVENOUS
Status: COMPLETED
Start: 2020-03-10 | End: 2020-03-10

## 2020-03-10 RX ORDER — HEPARIN SODIUM (PORCINE) LOCK FLUSH IV SOLN 100 UNIT/ML 100 UNIT/ML
5 SOLUTION INTRAVENOUS ONCE
Status: CANCELLED | OUTPATIENT
Start: 2020-03-10

## 2020-03-10 RX ORDER — SODIUM CHLORIDE 9 MG/ML
1000 INJECTION, SOLUTION INTRAVENOUS ONCE
Status: COMPLETED | OUTPATIENT
Start: 2020-03-10 | End: 2020-03-10

## 2020-03-10 RX ORDER — HEPARIN SODIUM (PORCINE) LOCK FLUSH IV SOLN 100 UNIT/ML 100 UNIT/ML
5 SOLUTION INTRAVENOUS ONCE
Status: COMPLETED | OUTPATIENT
Start: 2020-03-10 | End: 2020-03-10

## 2020-03-10 RX ORDER — 0.9 % SODIUM CHLORIDE 0.9 %
VIAL (ML) INJECTION
Status: DISCONTINUED
Start: 2020-03-10 | End: 2020-03-10

## 2020-03-10 RX ORDER — 0.9 % SODIUM CHLORIDE 0.9 %
10 VIAL (ML) INJECTION ONCE
Status: CANCELLED | OUTPATIENT
Start: 2020-03-10

## 2020-03-10 RX ADMIN — HEPARIN SODIUM (PORCINE) LOCK FLUSH IV SOLN 100 UNIT/ML 500 UNITS: 100 SOLUTION INTRAVENOUS at 16:15:00

## 2020-03-10 RX ADMIN — SODIUM CHLORIDE 1000 ML: 9 INJECTION, SOLUTION INTRAVENOUS at 14:17:00

## 2020-03-10 NOTE — PROGRESS NOTES
[facility] Radiation Treatment Management Note 1-5    Patient:  Kathryn Boyce. Age:  62year old  Visit Diagnosis:    1.  Small cell carcinoma of left lung [facility])      Primary Rad/Onc:  Dr. Blanco Martin Alecia Bugress

## 2020-03-10 NOTE — PROGRESS NOTES
Pt here for research study: C1D1 tecentriq, cisplatin, and etoposide. Also has radiation. Had first dose today. Per research study instructions, vitals signs were within 60 minutes of infusion of atezolizumab, vitals every 15 minutes during, and vital sign understanding  Comments: reviewed plan of care for today, explained for study vitals signs during. Reviewed home safety after chemotherapy. Has port now, did not want to leave access.

## 2020-03-11 ENCOUNTER — OFFICE VISIT (OUTPATIENT)
Dept: HEMATOLOGY/ONCOLOGY | Facility: HOSPITAL | Age: 60
End: 2020-03-11
Attending: INTERNAL MEDICINE
Payer: COMMERCIAL

## 2020-03-11 VITALS
SYSTOLIC BLOOD PRESSURE: 129 MMHG | HEART RATE: 62 BPM | OXYGEN SATURATION: 97 % | RESPIRATION RATE: 18 BRPM | DIASTOLIC BLOOD PRESSURE: 58 MMHG | TEMPERATURE: 97 F

## 2020-03-11 DIAGNOSIS — Z45.2 ENCOUNTER FOR CARE RELATED TO VASCULAR ACCESS PORT: ICD-10-CM

## 2020-03-11 DIAGNOSIS — C34.92 SMALL CELL CARCINOMA OF LEFT LUNG (HCC): Primary | ICD-10-CM

## 2020-03-11 PROCEDURE — 96375 TX/PRO/DX INJ NEW DRUG ADDON: CPT

## 2020-03-11 PROCEDURE — 77386 HC IMRT COMPLEX: CPT | Performed by: RADIOLOGY

## 2020-03-11 PROCEDURE — 96413 CHEMO IV INFUSION 1 HR: CPT

## 2020-03-11 RX ORDER — 0.9 % SODIUM CHLORIDE 0.9 %
10 VIAL (ML) INJECTION ONCE
Status: CANCELLED | OUTPATIENT
Start: 2020-03-11

## 2020-03-11 RX ORDER — HEPARIN SODIUM (PORCINE) LOCK FLUSH IV SOLN 100 UNIT/ML 100 UNIT/ML
SOLUTION INTRAVENOUS
Status: COMPLETED
Start: 2020-03-11 | End: 2020-03-11

## 2020-03-11 RX ORDER — HEPARIN SODIUM (PORCINE) LOCK FLUSH IV SOLN 100 UNIT/ML 100 UNIT/ML
5 SOLUTION INTRAVENOUS ONCE
Status: COMPLETED | OUTPATIENT
Start: 2020-03-11 | End: 2020-03-11

## 2020-03-11 RX ORDER — HEPARIN SODIUM (PORCINE) LOCK FLUSH IV SOLN 100 UNIT/ML 100 UNIT/ML
5 SOLUTION INTRAVENOUS ONCE
Status: CANCELLED | OUTPATIENT
Start: 2020-03-11

## 2020-03-11 RX ORDER — 0.9 % SODIUM CHLORIDE 0.9 %
VIAL (ML) INJECTION
Status: DISCONTINUED
Start: 2020-03-11 | End: 2020-03-11

## 2020-03-11 RX ADMIN — HEPARIN SODIUM (PORCINE) LOCK FLUSH IV SOLN 100 UNIT/ML 500 UNITS: 100 SOLUTION INTRAVENOUS at 09:24:00

## 2020-03-12 ENCOUNTER — OFFICE VISIT (OUTPATIENT)
Dept: HEMATOLOGY/ONCOLOGY | Facility: HOSPITAL | Age: 60
End: 2020-03-12
Attending: INTERNAL MEDICINE
Payer: COMMERCIAL

## 2020-03-12 VITALS
RESPIRATION RATE: 16 BRPM | DIASTOLIC BLOOD PRESSURE: 58 MMHG | HEART RATE: 58 BPM | OXYGEN SATURATION: 100 % | SYSTOLIC BLOOD PRESSURE: 114 MMHG | TEMPERATURE: 98 F

## 2020-03-12 DIAGNOSIS — C34.92 SMALL CELL CARCINOMA OF LEFT LUNG (HCC): Primary | ICD-10-CM

## 2020-03-12 PROCEDURE — 96375 TX/PRO/DX INJ NEW DRUG ADDON: CPT

## 2020-03-12 PROCEDURE — 96413 CHEMO IV INFUSION 1 HR: CPT

## 2020-03-12 PROCEDURE — 77386 HC IMRT COMPLEX: CPT | Performed by: INTERNAL MEDICINE

## 2020-03-12 RX ORDER — HEPARIN SODIUM (PORCINE) LOCK FLUSH IV SOLN 100 UNIT/ML 100 UNIT/ML
SOLUTION INTRAVENOUS
Status: DISCONTINUED
Start: 2020-03-12 | End: 2020-03-12

## 2020-03-12 RX ORDER — 0.9 % SODIUM CHLORIDE 0.9 %
VIAL (ML) INJECTION
Status: DISCONTINUED
Start: 2020-03-12 | End: 2020-03-12

## 2020-03-12 NOTE — PROGRESS NOTES
Pt here for : C1D3 etoposide (research study)   RT today as well     Per note in tx plan \"MD visit with labs visit in 1 week.  (No onc MD/APN visit unless no Rad onc MD visit scheduled already)\"  Has already scheduled lab and exam with Dr. Sammie Caceres on 3/1

## 2020-03-13 ENCOUNTER — TELEPHONE (OUTPATIENT)
Dept: HEMATOLOGY/ONCOLOGY | Facility: HOSPITAL | Age: 60
End: 2020-03-13

## 2020-03-13 DIAGNOSIS — Z51.11 CHEMOTHERAPY MANAGEMENT, ENCOUNTER FOR: ICD-10-CM

## 2020-03-13 DIAGNOSIS — C34.92 SMALL CELL CARCINOMA OF LEFT LUNG (HCC): ICD-10-CM

## 2020-03-13 DIAGNOSIS — R12 HEARTBURN: Primary | ICD-10-CM

## 2020-03-13 RX ORDER — PANTOPRAZOLE SODIUM 40 MG/1
40 TABLET, DELAYED RELEASE ORAL
Qty: 30 TABLET | Refills: 1 | Status: SHIPPED | OUTPATIENT
Start: 2020-03-13 | End: 2021-02-04

## 2020-03-13 NOTE — TELEPHONE ENCOUNTER
I called Jose Redd and updated him that Edd Mason sent a prescription for pantoprazole. He should take it everyday in the morning before he eats or drinks. He should avoid spicy, greasy, deep fried foods & citrus fruits/juice.  He may take tums or Marisol Selte

## 2020-03-13 NOTE — TELEPHONE ENCOUNTER
Toxicities: C1 D1 Tecentriq/Cisplatin/Etoposide on 3/10/2020 & daily RT    Heartburn    Heartburn: (Pt ate pizza for dinner. She had terrible heartburn all night. He could not sleep. He tried taking tums & chewing peppermint gum.  No relief.)    Stacy Valiente wants

## 2020-03-14 ENCOUNTER — TELEPHONE (OUTPATIENT)
Dept: HEMATOLOGY/ONCOLOGY | Facility: HOSPITAL | Age: 60
End: 2020-03-14

## 2020-03-14 NOTE — TELEPHONE ENCOUNTER
Patient called due to dry cough and weakness. He states that he completed chemo/RT and that he did not go to RT yesterday because he feels weak. States no sick contacts.   He states that weakness has been going on for a few days, \"it maybe due to the

## 2020-03-16 ENCOUNTER — APPOINTMENT (OUTPATIENT)
Dept: HEMATOLOGY/ONCOLOGY | Facility: HOSPITAL | Age: 60
End: 2020-03-16
Attending: INTERNAL MEDICINE
Payer: COMMERCIAL

## 2020-03-16 PROCEDURE — 77386 HC IMRT COMPLEX: CPT | Performed by: RADIOLOGY

## 2020-03-17 ENCOUNTER — RESEARCH ENCOUNTER (OUTPATIENT)
Dept: HEMATOLOGY/ONCOLOGY | Facility: HOSPITAL | Age: 60
End: 2020-03-17

## 2020-03-17 ENCOUNTER — OFFICE VISIT (OUTPATIENT)
Dept: RADIATION ONCOLOGY | Facility: HOSPITAL | Age: 60
End: 2020-03-17
Attending: RADIOLOGY
Payer: COMMERCIAL

## 2020-03-17 ENCOUNTER — NURSE ONLY (OUTPATIENT)
Dept: HEMATOLOGY/ONCOLOGY | Facility: HOSPITAL | Age: 60
End: 2020-03-17
Attending: INTERNAL MEDICINE
Payer: COMMERCIAL

## 2020-03-17 VITALS
WEIGHT: 131.81 LBS | DIASTOLIC BLOOD PRESSURE: 64 MMHG | HEART RATE: 67 BPM | OXYGEN SATURATION: 100 % | SYSTOLIC BLOOD PRESSURE: 114 MMHG | BODY MASS INDEX: 26 KG/M2 | TEMPERATURE: 98 F | RESPIRATION RATE: 16 BRPM

## 2020-03-17 DIAGNOSIS — Z45.2 ENCOUNTER FOR CARE RELATED TO VASCULAR ACCESS PORT: ICD-10-CM

## 2020-03-17 DIAGNOSIS — C34.92 SMALL CELL CARCINOMA OF LEFT LUNG (HCC): Primary | ICD-10-CM

## 2020-03-17 LAB
ALBUMIN SERPL-MCNC: 3.5 G/DL (ref 3.4–5)
ALBUMIN/GLOB SERPL: 1.1 {RATIO} (ref 1–2)
ALP LIVER SERPL-CCNC: 96 U/L (ref 45–117)
ALT SERPL-CCNC: 33 U/L (ref 16–61)
ANION GAP SERPL CALC-SCNC: 3 MMOL/L (ref 0–18)
AST SERPL-CCNC: 13 U/L (ref 15–37)
BASOPHILS # BLD AUTO: 0.08 X10(3) UL (ref 0–0.2)
BASOPHILS NFR BLD AUTO: 1.3 %
BILIRUB SERPL-MCNC: 0.6 MG/DL (ref 0.1–2)
BUN BLD-MCNC: 24 MG/DL (ref 7–18)
BUN/CREAT SERPL: 25.3 (ref 10–20)
CALCIUM BLD-MCNC: 9 MG/DL (ref 8.5–10.1)
CHLORIDE SERPL-SCNC: 102 MMOL/L (ref 98–112)
CO2 SERPL-SCNC: 31 MMOL/L (ref 21–32)
CREAT BLD-MCNC: 0.95 MG/DL (ref 0.7–1.3)
DEPRECATED RDW RBC AUTO: 42 FL (ref 35.1–46.3)
EOSINOPHIL # BLD AUTO: 0.06 X10(3) UL (ref 0–0.7)
EOSINOPHIL NFR BLD AUTO: 1 %
ERYTHROCYTE [DISTWIDTH] IN BLOOD BY AUTOMATED COUNT: 13 % (ref 11–15)
GLOBULIN PLAS-MCNC: 3.3 G/DL (ref 2.8–4.4)
GLUCOSE BLD-MCNC: 108 MG/DL (ref 70–99)
HCT VFR BLD AUTO: 37.7 % (ref 39–53)
HGB BLD-MCNC: 12.7 G/DL (ref 13–17.5)
IMM GRANULOCYTES # BLD AUTO: 0.13 X10(3) UL (ref 0–1)
IMM GRANULOCYTES NFR BLD: 2.1 %
LYMPHOCYTES # BLD AUTO: 0.64 X10(3) UL (ref 1–4)
LYMPHOCYTES NFR BLD AUTO: 10.2 %
M PROTEIN MFR SERPL ELPH: 6.8 G/DL (ref 6.4–8.2)
MCH RBC QN AUTO: 30 PG (ref 26–34)
MCHC RBC AUTO-ENTMCNC: 33.7 G/DL (ref 31–37)
MCV RBC AUTO: 88.9 FL (ref 80–100)
MONOCYTES # BLD AUTO: 0.16 X10(3) UL (ref 0.1–1)
MONOCYTES NFR BLD AUTO: 2.6 %
NEUTROPHILS # BLD AUTO: 5.18 X10 (3) UL (ref 1.5–7.7)
NEUTROPHILS # BLD AUTO: 5.18 X10(3) UL (ref 1.5–7.7)
NEUTROPHILS NFR BLD AUTO: 82.8 %
OSMOLALITY SERPL CALC.SUM OF ELEC: 287 MOSM/KG (ref 275–295)
PLATELET # BLD AUTO: 166 10(3)UL (ref 150–450)
POTASSIUM SERPL-SCNC: 4.2 MMOL/L (ref 3.5–5.1)
RBC # BLD AUTO: 4.24 X10(6)UL (ref 4.3–5.7)
SODIUM SERPL-SCNC: 136 MMOL/L (ref 136–145)
WBC # BLD AUTO: 6.3 X10(3) UL (ref 4–11)

## 2020-03-17 PROCEDURE — 77386 HC IMRT COMPLEX: CPT | Performed by: RADIOLOGY

## 2020-03-17 PROCEDURE — 80053 COMPREHEN METABOLIC PANEL: CPT

## 2020-03-17 PROCEDURE — 36591 DRAW BLOOD OFF VENOUS DEVICE: CPT

## 2020-03-17 PROCEDURE — 85025 COMPLETE CBC W/AUTO DIFF WBC: CPT

## 2020-03-17 RX ORDER — HEPARIN SODIUM (PORCINE) LOCK FLUSH IV SOLN 100 UNIT/ML 100 UNIT/ML
5 SOLUTION INTRAVENOUS ONCE
Status: COMPLETED | OUTPATIENT
Start: 2020-03-17 | End: 2020-03-17

## 2020-03-17 RX ORDER — HEPARIN SODIUM (PORCINE) LOCK FLUSH IV SOLN 100 UNIT/ML 100 UNIT/ML
5 SOLUTION INTRAVENOUS ONCE
Status: CANCELLED | OUTPATIENT
Start: 2020-03-17

## 2020-03-17 RX ORDER — ONDANSETRON 4 MG/1
4 TABLET, ORALLY DISINTEGRATING ORAL EVERY 8 HOURS PRN
COMMUNITY
End: 2020-08-03

## 2020-03-17 RX ORDER — 0.9 % SODIUM CHLORIDE 0.9 %
VIAL (ML) INJECTION
Status: DISCONTINUED
Start: 2020-03-17 | End: 2020-03-17

## 2020-03-17 RX ORDER — 0.9 % SODIUM CHLORIDE 0.9 %
10 VIAL (ML) INJECTION ONCE
Status: CANCELLED | OUTPATIENT
Start: 2020-03-17

## 2020-03-17 RX ORDER — PROCHLORPERAZINE MALEATE 10 MG
10 TABLET ORAL EVERY 6 HOURS PRN
COMMUNITY
End: 2020-08-03

## 2020-03-17 RX ADMIN — HEPARIN SODIUM (PORCINE) LOCK FLUSH IV SOLN 100 UNIT/ML 500 UNITS: 100 SOLUTION INTRAVENOUS at 08:54:00

## 2020-03-17 NOTE — PROGRESS NOTES
[facility] Radiation Treatment Management Note 1-5    Patient:  Ivonne Mae. Age:  62year old  Visit Diagnosis:    1.  Small cell carcinoma of left lung [facility])      Primary Rad/Onc:  Dr. Fam Squires carlos Monterroso

## 2020-03-18 PROCEDURE — 77386 HC IMRT COMPLEX: CPT | Performed by: RADIOLOGY

## 2020-03-19 ENCOUNTER — DIETICIAN VISIT (OUTPATIENT)
Dept: NUTRITION | Facility: HOSPITAL | Age: 60
End: 2020-03-19

## 2020-03-19 VITALS — BODY MASS INDEX: 26 KG/M2 | WEIGHT: 132.63 LBS

## 2020-03-19 PROCEDURE — 77386 HC IMRT COMPLEX: CPT | Performed by: RADIOLOGY

## 2020-03-19 NOTE — PROGRESS NOTES
Oncology Nutrition Assessment    Ht Readings from Last 1 Encounters:  03/09/20 : 152.4 cm (5')      Wt Readings from Last 1 Encounters:  03/19/20 : 60.1 kg (132 lb 9.6 oz)    BMI Calculated: Body mass index is 25.9 kg/m². Weight History:   Wt Readings fro

## 2020-03-20 PROCEDURE — 77386 HC IMRT COMPLEX: CPT | Performed by: RADIOLOGY

## 2020-03-20 PROCEDURE — 77336 RADIATION PHYSICS CONSULT: CPT | Performed by: RADIOLOGY

## 2020-03-23 PROCEDURE — 77386 HC IMRT COMPLEX: CPT | Performed by: RADIOLOGY

## 2020-03-23 NOTE — PROGRESS NOTES
Study: [facility]   Patient YY:--[id number]  Treatment Arm: Arm 2   Timepoint: C1D8    Patient here for C1D8. Patient arrived late to appointment, labs drawn per protocol, evaluated by Dr. Oli Morrell. Glucose elevated but non-fasting.   Patient refused q

## 2020-03-23 NOTE — PROGRESS NOTES
Study: NRG   Patient ID: --[id number]  Treatment Arm: Arm 2  Timepoint: C1D1    Patient here for C1D1 on [address]. Patient evaluated by Dr. Francis Arteaga on 3/9/20, labs drawn and reviewed on 3/9/20 and OK to proceed with treatment.   Questionnaires comp

## 2020-03-24 ENCOUNTER — RESEARCH ENCOUNTER (OUTPATIENT)
Dept: HEMATOLOGY/ONCOLOGY | Facility: HOSPITAL | Age: 60
End: 2020-03-24

## 2020-03-24 ENCOUNTER — APPOINTMENT (OUTPATIENT)
Dept: HEMATOLOGY/ONCOLOGY | Facility: HOSPITAL | Age: 60
End: 2020-03-24
Attending: INTERNAL MEDICINE
Payer: COMMERCIAL

## 2020-03-24 ENCOUNTER — TELEPHONE (OUTPATIENT)
Dept: HEMATOLOGY/ONCOLOGY | Facility: HOSPITAL | Age: 60
End: 2020-03-24

## 2020-03-24 ENCOUNTER — OFFICE VISIT (OUTPATIENT)
Dept: RADIATION ONCOLOGY | Facility: HOSPITAL | Age: 60
End: 2020-03-24
Attending: RADIOLOGY
Payer: COMMERCIAL

## 2020-03-24 VITALS
DIASTOLIC BLOOD PRESSURE: 70 MMHG | HEIGHT: 60 IN | RESPIRATION RATE: 16 BRPM | SYSTOLIC BLOOD PRESSURE: 108 MMHG | WEIGHT: 135.19 LBS | BODY MASS INDEX: 26.54 KG/M2 | HEART RATE: 67 BPM | TEMPERATURE: 98 F

## 2020-03-24 DIAGNOSIS — C34.92 SMALL CELL CARCINOMA OF LEFT LUNG (HCC): Primary | ICD-10-CM

## 2020-03-24 LAB
ALBUMIN SERPL-MCNC: 3.3 G/DL (ref 3.4–5)
ALBUMIN/GLOB SERPL: 0.9 {RATIO} (ref 1–2)
ALP LIVER SERPL-CCNC: 93 U/L (ref 45–117)
ALT SERPL-CCNC: 23 U/L (ref 16–61)
ANION GAP SERPL CALC-SCNC: 4 MMOL/L (ref 0–18)
AST SERPL-CCNC: 11 U/L (ref 15–37)
BASOPHILS # BLD AUTO: 0.04 X10(3) UL (ref 0–0.2)
BASOPHILS NFR BLD AUTO: 2.1 %
BILIRUB SERPL-MCNC: 0.4 MG/DL (ref 0.1–2)
BUN BLD-MCNC: 22 MG/DL (ref 7–18)
BUN/CREAT SERPL: 22.9 (ref 10–20)
CALCIUM BLD-MCNC: 9.1 MG/DL (ref 8.5–10.1)
CHLORIDE SERPL-SCNC: 104 MMOL/L (ref 98–112)
CO2 SERPL-SCNC: 31 MMOL/L (ref 21–32)
CREAT BLD-MCNC: 0.96 MG/DL (ref 0.7–1.3)
DEPRECATED RDW RBC AUTO: 43.3 FL (ref 35.1–46.3)
EOSINOPHIL # BLD AUTO: 0.09 X10(3) UL (ref 0–0.7)
EOSINOPHIL NFR BLD AUTO: 4.6 %
ERYTHROCYTE [DISTWIDTH] IN BLOOD BY AUTOMATED COUNT: 13.4 % (ref 11–15)
GLOBULIN PLAS-MCNC: 3.6 G/DL (ref 2.8–4.4)
GLUCOSE BLD-MCNC: 111 MG/DL (ref 70–99)
HCT VFR BLD AUTO: 37.6 % (ref 39–53)
HGB BLD-MCNC: 12.4 G/DL (ref 13–17.5)
IMM GRANULOCYTES # BLD AUTO: 0.01 X10(3) UL (ref 0–1)
IMM GRANULOCYTES NFR BLD: 0.5 %
LYMPHOCYTES # BLD AUTO: 0.58 X10(3) UL (ref 1–4)
LYMPHOCYTES NFR BLD AUTO: 29.9 %
M PROTEIN MFR SERPL ELPH: 6.9 G/DL (ref 6.4–8.2)
MCH RBC QN AUTO: 30 PG (ref 26–34)
MCHC RBC AUTO-ENTMCNC: 33 G/DL (ref 31–37)
MCV RBC AUTO: 91 FL (ref 80–100)
MONOCYTES # BLD AUTO: 0.36 X10(3) UL (ref 0.1–1)
MONOCYTES NFR BLD AUTO: 18.6 %
NEUTROPHILS # BLD AUTO: 0.86 X10 (3) UL (ref 1.5–7.7)
NEUTROPHILS # BLD AUTO: 0.86 X10(3) UL (ref 1.5–7.7)
NEUTROPHILS NFR BLD AUTO: 44.3 %
OSMOLALITY SERPL CALC.SUM OF ELEC: 292 MOSM/KG (ref 275–295)
PATIENT FASTING Y/N/NP: NO
PLATELET # BLD AUTO: 136 10(3)UL (ref 150–450)
POTASSIUM SERPL-SCNC: 5.3 MMOL/L (ref 3.5–5.1)
RBC # BLD AUTO: 4.13 X10(6)UL (ref 4.3–5.7)
SODIUM SERPL-SCNC: 139 MMOL/L (ref 136–145)
WBC # BLD AUTO: 1.9 X10(3) UL (ref 4–11)

## 2020-03-24 PROCEDURE — 36415 COLL VENOUS BLD VENIPUNCTURE: CPT

## 2020-03-24 PROCEDURE — 85025 COMPLETE CBC W/AUTO DIFF WBC: CPT

## 2020-03-24 PROCEDURE — 80053 COMPREHEN METABOLIC PANEL: CPT

## 2020-03-24 PROCEDURE — 77386 HC IMRT COMPLEX: CPT | Performed by: RADIOLOGY

## 2020-03-24 RX ORDER — 0.9 % SODIUM CHLORIDE 0.9 %
VIAL (ML) INJECTION
Status: DISCONTINUED
Start: 2020-03-24 | End: 2020-03-24 | Stop reason: WASHOUT

## 2020-03-24 NOTE — TELEPHONE ENCOUNTER
Matthew Ingram called and was looking to go over Camb recent blood work with a nurse. She stated that he told her that his potassium was low and she wanted to know how low it was. She asked that she please get a call back.

## 2020-03-24 NOTE — PROGRESS NOTES
Study: NRG   Patient GN:--[id number]  Treatment Arm: Arm 2   Timepoint: C1D15    Patient here for C1D15. Labs drawn per protocol. K elevated to 5.3, . Dr. Jamal Emanuel notified.   Patient educated to avoid foods high in potassium including, bana

## 2020-03-24 NOTE — PROGRESS NOTES
[place] [address]  Radiation Oncology New Consultation      Patient Name: Jeyson Azar.    MRN: [id number]  PCP: Forrest Lopez MD  Referring Physician: Tula Severs, MD    Diagnosis: limited stage SCLC of the left lung    HPI: The patient is a bowel disease.      Physical Exam   20  0841   BP: 108/70   Pulse: 67   Resp: 16   Temp: 97.7 °F (36.5 °C)     ECO    Gen: NAD  CV: RRR  Lungs: CTAB, no wheezes  Skin: papular erythematous rash noted on posterior neck and scalp, no sign of infecti

## 2020-03-24 NOTE — PROGRESS NOTES
[facility] Radiation Treatment Management Note 6-10    Patient:  Deborah Medrano. Age:  62year old  Visit Diagnosis:    1.  Small cell carcinoma of left lung [facility])      Primary Rad/Onc:  Dr. Luz Maria Unger kg (135 lb 3.2 oz)  03/19/20 : 60.1 kg (132 lb 9.6 oz)  03/17/20 : 59.8 kg (131 lb 12.8 oz)  03/09/20 : 59.9 kg (132 lb 0.9 oz)  03/02/20 : 57.2 kg (126 lb 1.7 oz)  02/21/20 : 58.6 kg (129 lb 3.2 oz)    Pablo Walsh RN    Physician Note:  Subjective:  -Loletta Asa

## 2020-03-25 PROCEDURE — 77386 HC IMRT COMPLEX: CPT | Performed by: RADIOLOGY

## 2020-03-26 ENCOUNTER — DIETICIAN VISIT (OUTPATIENT)
Dept: NUTRITION | Facility: HOSPITAL | Age: 60
End: 2020-03-26

## 2020-03-26 VITALS — BODY MASS INDEX: 27 KG/M2 | WEIGHT: 136.19 LBS

## 2020-03-26 PROCEDURE — 77386 HC IMRT COMPLEX: CPT | Performed by: RADIOLOGY

## 2020-03-26 NOTE — PROGRESS NOTES
Oncology Nutrition Assessment    Ht Readings from Last 1 Encounters:  03/24/20 : 152.4 cm (5')      Wt Readings from Last 1 Encounters:  03/26/20 : 61.8 kg (136 lb 3.2 oz)    BMI Calculated: Body mass index is 26.6 kg/m². Weight History:   Wt Readings fro

## 2020-03-27 PROCEDURE — 77386 HC IMRT COMPLEX: CPT | Performed by: RADIOLOGY

## 2020-03-27 PROCEDURE — 77336 RADIATION PHYSICS CONSULT: CPT | Performed by: RADIOLOGY

## 2020-03-30 ENCOUNTER — NURSE ONLY (OUTPATIENT)
Dept: HEMATOLOGY/ONCOLOGY | Facility: HOSPITAL | Age: 60
End: 2020-03-30
Attending: INTERNAL MEDICINE
Payer: COMMERCIAL

## 2020-03-30 ENCOUNTER — RESEARCH ENCOUNTER (OUTPATIENT)
Dept: HEMATOLOGY/ONCOLOGY | Facility: HOSPITAL | Age: 60
End: 2020-03-30

## 2020-03-30 VITALS
OXYGEN SATURATION: 100 % | BODY MASS INDEX: 26.23 KG/M2 | DIASTOLIC BLOOD PRESSURE: 59 MMHG | SYSTOLIC BLOOD PRESSURE: 133 MMHG | TEMPERATURE: 98 F | HEIGHT: 60 IN | HEART RATE: 66 BPM | WEIGHT: 133.63 LBS | RESPIRATION RATE: 16 BRPM

## 2020-03-30 DIAGNOSIS — T45.1X5A CHEMOTHERAPY INDUCED NEUTROPENIA (HCC): ICD-10-CM

## 2020-03-30 DIAGNOSIS — Z51.11 CHEMOTHERAPY MANAGEMENT, ENCOUNTER FOR: Primary | ICD-10-CM

## 2020-03-30 DIAGNOSIS — C34.92 SMALL CELL CARCINOMA OF LEFT LUNG (HCC): Primary | ICD-10-CM

## 2020-03-30 DIAGNOSIS — D70.1 CHEMOTHERAPY INDUCED NEUTROPENIA (HCC): ICD-10-CM

## 2020-03-30 DIAGNOSIS — R21 RASH: ICD-10-CM

## 2020-03-30 DIAGNOSIS — C34.92 SMALL CELL CARCINOMA OF LEFT LUNG (HCC): ICD-10-CM

## 2020-03-30 DIAGNOSIS — Z00.6 CLINICAL TRIAL EXAM: ICD-10-CM

## 2020-03-30 DIAGNOSIS — Z51.11 CHEMOTHERAPY MANAGEMENT, ENCOUNTER FOR: ICD-10-CM

## 2020-03-30 LAB
ALBUMIN SERPL-MCNC: 3.5 G/DL (ref 3.4–5)
ALBUMIN/GLOB SERPL: 0.9 {RATIO} (ref 1–2)
ALP LIVER SERPL-CCNC: 94 U/L (ref 45–117)
ALT SERPL-CCNC: 25 U/L (ref 16–61)
ANION GAP SERPL CALC-SCNC: 5 MMOL/L (ref 0–18)
AST SERPL-CCNC: 13 U/L (ref 15–37)
BASOPHILS # BLD: 0 X10(3) UL (ref 0–0.2)
BASOPHILS NFR BLD: 0 %
BILIRUB SERPL-MCNC: 0.4 MG/DL (ref 0.1–2)
BUN BLD-MCNC: 12 MG/DL (ref 7–18)
BUN/CREAT SERPL: 11.8 (ref 10–20)
CALCIUM BLD-MCNC: 9.1 MG/DL (ref 8.5–10.1)
CHLORIDE SERPL-SCNC: 102 MMOL/L (ref 98–112)
CO2 SERPL-SCNC: 29 MMOL/L (ref 21–32)
CREAT BLD-MCNC: 1.02 MG/DL (ref 0.7–1.3)
DEPRECATED RDW RBC AUTO: 46.8 FL (ref 35.1–46.3)
EOSINOPHIL # BLD: 0.03 X10(3) UL (ref 0–0.7)
EOSINOPHIL NFR BLD: 1 %
ERYTHROCYTE [DISTWIDTH] IN BLOOD BY AUTOMATED COUNT: 14.4 % (ref 11–15)
GLOBULIN PLAS-MCNC: 3.7 G/DL (ref 2.8–4.4)
GLUCOSE BLD-MCNC: 167 MG/DL (ref 70–99)
HCT VFR BLD AUTO: 40.5 % (ref 39–53)
HGB BLD-MCNC: 13.4 G/DL (ref 13–17.5)
LYMPHOCYTES NFR BLD: 0.64 X10(3) UL (ref 1–4)
LYMPHOCYTES NFR BLD: 20 %
M PROTEIN MFR SERPL ELPH: 7.2 G/DL (ref 6.4–8.2)
MCH RBC QN AUTO: 30.2 PG (ref 26–34)
MCHC RBC AUTO-ENTMCNC: 33.1 G/DL (ref 31–37)
MCV RBC AUTO: 91.4 FL (ref 80–100)
METAMYELOCYTES # BLD: 0.13 X10(3) UL
METAMYELOCYTES NFR BLD: 4 %
MONOCYTES # BLD: 0.35 X10(3) UL (ref 0.1–1)
MONOCYTES NFR BLD: 11 %
MYELOCYTES # BLD: 0.1 X10(3) UL
MYELOCYTES NFR BLD: 3 %
NEUTROPHILS # BLD AUTO: 1.66 X10 (3) UL (ref 1.5–7.7)
NEUTROPHILS NFR BLD: 56 %
NEUTS BAND NFR BLD: 5 %
NEUTS HYPERSEG # BLD: 1.95 X10(3) UL (ref 1.5–7.7)
OSMOLALITY SERPL CALC.SUM OF ELEC: 286 MOSM/KG (ref 275–295)
PATIENT FASTING Y/N/NP: NO
PLATELET # BLD AUTO: 248 10(3)UL (ref 150–450)
PLATELET MORPHOLOGY: NORMAL
POTASSIUM SERPL-SCNC: 4.5 MMOL/L (ref 3.5–5.1)
RBC # BLD AUTO: 4.43 X10(6)UL (ref 4.3–5.7)
SODIUM SERPL-SCNC: 136 MMOL/L (ref 136–145)
TOTAL CELLS COUNTED: 100
TSI SER-ACNC: 1.39 MIU/ML (ref 0.36–3.74)
WBC # BLD AUTO: 3.2 X10(3) UL (ref 4–11)

## 2020-03-30 PROCEDURE — 85025 COMPLETE CBC W/AUTO DIFF WBC: CPT

## 2020-03-30 PROCEDURE — 36415 COLL VENOUS BLD VENIPUNCTURE: CPT

## 2020-03-30 PROCEDURE — 84443 ASSAY THYROID STIM HORMONE: CPT

## 2020-03-30 PROCEDURE — 99215 OFFICE O/P EST HI 40 MIN: CPT | Performed by: INTERNAL MEDICINE

## 2020-03-30 PROCEDURE — 80053 COMPREHEN METABOLIC PANEL: CPT

## 2020-03-30 PROCEDURE — 77386 HC IMRT COMPLEX: CPT | Performed by: RADIOLOGY

## 2020-03-30 PROCEDURE — 85027 COMPLETE CBC AUTOMATED: CPT

## 2020-03-30 PROCEDURE — 85007 BL SMEAR W/DIFF WBC COUNT: CPT

## 2020-03-30 RX ORDER — SODIUM CHLORIDE 9 MG/ML
1000 INJECTION, SOLUTION INTRAVENOUS ONCE
Status: CANCELLED | OUTPATIENT
Start: 2020-03-31

## 2020-03-30 NOTE — PROGRESS NOTES
[facility] Progress Note    Patient Name: Nina Mendoza. YOB: 1960   Medical Record Number: [id number]   Attending Physician: Ranjith Flood M.D.      Chief Complaint:  Small cell lung cancer    History of Present Illness:  Cancer h Smokeless tobacco: Never Used    Substance and Sexual Activity      Alcohol use:  Yes        Alcohol/week: 7.0 standard drinks        Types: 7 Cans of beer per week        Frequency: 4 or more times a week        Drinks per session: 1 or 2        Binge freq Jenn Carter MD, Stopped at 03/12/20 1016  [COMPLETED] ondansetron HCl (ZOFRAN) 8 mg, Dexamethasone Sodium Phosphate (DECADRON) 10 mg in sodium chloride 0.9% 105 mL IVPB, , Intravenous, Once, Jenn Carter MD, Stopped at 03/11/20 0759  [COMPLETED] etopo (SUBLIMAZE) 0.05 MG/ML injection, , , ,   [COMPLETED] Midazolam HCl (VERSED) 2 MG/2ML injection, , , ,   [COMPLETED] Heparin Sodium Lock Flush 100 UNIT/ML injection, , , ,   [COMPLETED] Heparin (Porcine) in NaCl 2 UNITS/ML premix flush, , , ,   [COMPLETED] 3.7 03/30/2020     03/30/2020    K 4.5 03/30/2020     03/30/2020    CO2 29.0 03/30/2020       Radiology:  none    Cancer Staging  No matching staging information was found for the patient.     Impression and Plan:  68-year-old male being evaluat

## 2020-03-30 NOTE — PROGRESS NOTES
Study: MACIELG   Patient JT:--[id number]  Treatment Arm: Arm 2   Timepoint: C2D1     Patient here for C2D1 on [facility] [address]. Labs drawn per protocol. White count recovered to G1. ANC WNL. Potassium has returned to normal range.   Glucose is elevated bu

## 2020-03-31 ENCOUNTER — APPOINTMENT (OUTPATIENT)
Dept: HEMATOLOGY/ONCOLOGY | Facility: HOSPITAL | Age: 60
End: 2020-03-31
Attending: INTERNAL MEDICINE
Payer: COMMERCIAL

## 2020-03-31 ENCOUNTER — OFFICE VISIT (OUTPATIENT)
Dept: RADIATION ONCOLOGY | Facility: HOSPITAL | Age: 60
End: 2020-03-31
Attending: RADIOLOGY
Payer: COMMERCIAL

## 2020-03-31 VITALS
TEMPERATURE: 98 F | HEIGHT: 60 IN | BODY MASS INDEX: 26.47 KG/M2 | WEIGHT: 134.81 LBS | RESPIRATION RATE: 16 BRPM | HEART RATE: 74 BPM | SYSTOLIC BLOOD PRESSURE: 130 MMHG | DIASTOLIC BLOOD PRESSURE: 62 MMHG

## 2020-03-31 VITALS
WEIGHT: 134.81 LBS | RESPIRATION RATE: 16 BRPM | BODY MASS INDEX: 26 KG/M2 | SYSTOLIC BLOOD PRESSURE: 130 MMHG | OXYGEN SATURATION: 100 % | HEART RATE: 74 BPM | DIASTOLIC BLOOD PRESSURE: 62 MMHG | TEMPERATURE: 98 F

## 2020-03-31 DIAGNOSIS — Z45.2 ENCOUNTER FOR CARE RELATED TO VASCULAR ACCESS PORT: ICD-10-CM

## 2020-03-31 DIAGNOSIS — C34.92 SMALL CELL CARCINOMA OF LEFT LUNG (HCC): Primary | ICD-10-CM

## 2020-03-31 PROCEDURE — 36593 DECLOT VASCULAR DEVICE: CPT

## 2020-03-31 PROCEDURE — 96372 THER/PROPH/DIAG INJ SC/IM: CPT

## 2020-03-31 PROCEDURE — A4216 STERILE WATER/SALINE, 10 ML: HCPCS

## 2020-03-31 PROCEDURE — 77386 HC IMRT COMPLEX: CPT | Performed by: RADIOLOGY

## 2020-03-31 PROCEDURE — 96366 THER/PROPH/DIAG IV INF ADDON: CPT

## 2020-03-31 PROCEDURE — 96413 CHEMO IV INFUSION 1 HR: CPT

## 2020-03-31 PROCEDURE — 96367 TX/PROPH/DG ADDL SEQ IV INF: CPT

## 2020-03-31 PROCEDURE — 96417 CHEMO IV INFUS EACH ADDL SEQ: CPT

## 2020-03-31 PROCEDURE — 96361 HYDRATE IV INFUSION ADD-ON: CPT

## 2020-03-31 RX ORDER — HEPARIN SODIUM (PORCINE) LOCK FLUSH IV SOLN 100 UNIT/ML 100 UNIT/ML
5 SOLUTION INTRAVENOUS ONCE
Status: CANCELLED | OUTPATIENT
Start: 2020-03-31

## 2020-03-31 RX ORDER — 0.9 % SODIUM CHLORIDE 0.9 %
10 VIAL (ML) INJECTION ONCE
Status: DISCONTINUED | OUTPATIENT
Start: 2020-03-31 | End: 2020-03-31

## 2020-03-31 RX ORDER — HEPARIN SODIUM (PORCINE) LOCK FLUSH IV SOLN 100 UNIT/ML 100 UNIT/ML
SOLUTION INTRAVENOUS
Status: COMPLETED
Start: 2020-03-31 | End: 2020-03-31

## 2020-03-31 RX ORDER — 0.9 % SODIUM CHLORIDE 0.9 %
10 VIAL (ML) INJECTION ONCE
Status: CANCELLED | OUTPATIENT
Start: 2020-03-31

## 2020-03-31 RX ORDER — 0.9 % SODIUM CHLORIDE 0.9 %
VIAL (ML) INJECTION
Status: DISCONTINUED
Start: 2020-03-31 | End: 2020-03-31

## 2020-03-31 RX ORDER — HEPARIN SODIUM (PORCINE) LOCK FLUSH IV SOLN 100 UNIT/ML 100 UNIT/ML
5 SOLUTION INTRAVENOUS ONCE
Status: COMPLETED | OUTPATIENT
Start: 2020-03-31 | End: 2020-03-31

## 2020-03-31 RX ORDER — SODIUM CHLORIDE 9 MG/ML
1000 INJECTION, SOLUTION INTRAVENOUS ONCE
Status: COMPLETED | OUTPATIENT
Start: 2020-03-31 | End: 2020-03-31

## 2020-03-31 RX ADMIN — SODIUM CHLORIDE 1000 ML: 9 INJECTION, SOLUTION INTRAVENOUS at 14:51:00

## 2020-03-31 RX ADMIN — HEPARIN SODIUM (PORCINE) LOCK FLUSH IV SOLN 100 UNIT/ML 500 UNITS: 100 SOLUTION INTRAVENOUS at 16:40:00

## 2020-03-31 NOTE — PROGRESS NOTES
Pt here for research study: C2D1 tecentriq, cisplatin, and etoposide.   Radiation this morning     Arrives Ambulating independently, accompanied by Self           Patient reports possible pregnancy since last therapy cycle: Not Applicable    Modifications i

## 2020-03-31 NOTE — PROGRESS NOTES
[facility] Radiation Treatment Management Note 11-15    Patient:  Desean Noel. Age:  62year old  Visit Diagnosis:    1.  Small cell carcinoma of left lung [facility])      Primary Rad/Onc:  Dr. Salma Isbell    Site Delivered D from Last 6 Encounters:  03/31/20 : 61.1 kg (134 lb 12.8 oz)  03/30/20 : 60.6 kg (133 lb 9.6 oz)  03/26/20 : 61.8 kg (136 lb 3.2 oz)  03/24/20 : 61.3 kg (135 lb 3.2 oz)  03/19/20 : 60.1 kg (132 lb 9.6 oz)  03/17/20 : 59.8 kg (131 lb 12.8 oz)        Taylor Be

## 2020-04-01 ENCOUNTER — OFFICE VISIT (OUTPATIENT)
Dept: HEMATOLOGY/ONCOLOGY | Facility: HOSPITAL | Age: 60
End: 2020-04-01
Attending: INTERNAL MEDICINE
Payer: COMMERCIAL

## 2020-04-01 ENCOUNTER — RESEARCH ENCOUNTER (OUTPATIENT)
Dept: HEMATOLOGY/ONCOLOGY | Facility: HOSPITAL | Age: 60
End: 2020-04-01

## 2020-04-01 ENCOUNTER — APPOINTMENT (OUTPATIENT)
Dept: RADIATION ONCOLOGY | Facility: HOSPITAL | Age: 60
End: 2020-04-01
Attending: INTERNAL MEDICINE
Payer: COMMERCIAL

## 2020-04-01 VITALS
SYSTOLIC BLOOD PRESSURE: 129 MMHG | HEART RATE: 71 BPM | DIASTOLIC BLOOD PRESSURE: 63 MMHG | RESPIRATION RATE: 16 BRPM | TEMPERATURE: 98 F | OXYGEN SATURATION: 99 %

## 2020-04-01 DIAGNOSIS — Z45.2 ENCOUNTER FOR CARE RELATED TO VASCULAR ACCESS PORT: ICD-10-CM

## 2020-04-01 DIAGNOSIS — C34.92 SMALL CELL CARCINOMA OF LEFT LUNG (HCC): Primary | ICD-10-CM

## 2020-04-01 PROCEDURE — 96413 CHEMO IV INFUSION 1 HR: CPT

## 2020-04-01 PROCEDURE — 77386 HC IMRT COMPLEX: CPT | Performed by: RADIOLOGY

## 2020-04-01 PROCEDURE — 96375 TX/PRO/DX INJ NEW DRUG ADDON: CPT

## 2020-04-01 RX ORDER — HEPARIN SODIUM (PORCINE) LOCK FLUSH IV SOLN 100 UNIT/ML 100 UNIT/ML
5 SOLUTION INTRAVENOUS ONCE
Status: COMPLETED | OUTPATIENT
Start: 2020-04-01 | End: 2020-04-01

## 2020-04-01 RX ORDER — HEPARIN SODIUM (PORCINE) LOCK FLUSH IV SOLN 100 UNIT/ML 100 UNIT/ML
SOLUTION INTRAVENOUS
Status: COMPLETED
Start: 2020-04-01 | End: 2020-04-01

## 2020-04-01 RX ORDER — 0.9 % SODIUM CHLORIDE 0.9 %
VIAL (ML) INJECTION
Status: DISCONTINUED
Start: 2020-04-01 | End: 2020-04-01

## 2020-04-01 RX ORDER — HEPARIN SODIUM (PORCINE) LOCK FLUSH IV SOLN 100 UNIT/ML 100 UNIT/ML
5 SOLUTION INTRAVENOUS ONCE
Status: CANCELLED | OUTPATIENT
Start: 2020-04-01

## 2020-04-01 RX ORDER — 0.9 % SODIUM CHLORIDE 0.9 %
10 VIAL (ML) INJECTION ONCE
Status: CANCELLED | OUTPATIENT
Start: 2020-04-01

## 2020-04-01 RX ADMIN — HEPARIN SODIUM (PORCINE) LOCK FLUSH IV SOLN 100 UNIT/ML 500 UNITS: 100 SOLUTION INTRAVENOUS at 10:00:00

## 2020-04-01 NOTE — PROGRESS NOTES
Pt here for research study: C2D2  etoposide. Also has radiation daily.        Arrives Ambulating independently, accompanied by Self           Patient reports possible pregnancy since last therapy cycle: Not Applicable    Modifications in dose or schedule:

## 2020-04-01 NOTE — PROGRESS NOTES
STUDY:   ID # --[id number]  C2 D2    Patient comes in for his day 2 etoposide treatment on protocol. States that his rash on back is still there, not worse but he now notices it on his belly.    Upon assessment, noted grade 1 maculo-papular rash t

## 2020-04-02 ENCOUNTER — RESEARCH ENCOUNTER (OUTPATIENT)
Dept: HEMATOLOGY/ONCOLOGY | Facility: HOSPITAL | Age: 60
End: 2020-04-02

## 2020-04-02 ENCOUNTER — OFFICE VISIT (OUTPATIENT)
Dept: HEMATOLOGY/ONCOLOGY | Facility: HOSPITAL | Age: 60
End: 2020-04-02
Attending: INTERNAL MEDICINE
Payer: COMMERCIAL

## 2020-04-02 ENCOUNTER — DIETICIAN VISIT (OUTPATIENT)
Dept: NUTRITION | Facility: HOSPITAL | Age: 60
End: 2020-04-02

## 2020-04-02 VITALS
TEMPERATURE: 99 F | DIASTOLIC BLOOD PRESSURE: 58 MMHG | RESPIRATION RATE: 16 BRPM | OXYGEN SATURATION: 98 % | SYSTOLIC BLOOD PRESSURE: 116 MMHG | HEART RATE: 68 BPM

## 2020-04-02 DIAGNOSIS — C34.92 SMALL CELL CARCINOMA OF LEFT LUNG (HCC): Primary | ICD-10-CM

## 2020-04-02 DIAGNOSIS — Z45.2 ENCOUNTER FOR CARE RELATED TO VASCULAR ACCESS PORT: ICD-10-CM

## 2020-04-02 PROCEDURE — 77386 HC IMRT COMPLEX: CPT | Performed by: RADIOLOGY

## 2020-04-02 PROCEDURE — 96375 TX/PRO/DX INJ NEW DRUG ADDON: CPT

## 2020-04-02 PROCEDURE — 96413 CHEMO IV INFUSION 1 HR: CPT

## 2020-04-02 RX ORDER — HEPARIN SODIUM (PORCINE) LOCK FLUSH IV SOLN 100 UNIT/ML 100 UNIT/ML
5 SOLUTION INTRAVENOUS ONCE
Status: CANCELLED | OUTPATIENT
Start: 2020-04-02

## 2020-04-02 RX ORDER — 0.9 % SODIUM CHLORIDE 0.9 %
VIAL (ML) INJECTION
Status: DISCONTINUED
Start: 2020-04-02 | End: 2020-04-02

## 2020-04-02 RX ORDER — HEPARIN SODIUM (PORCINE) LOCK FLUSH IV SOLN 100 UNIT/ML 100 UNIT/ML
5 SOLUTION INTRAVENOUS ONCE
Status: COMPLETED | OUTPATIENT
Start: 2020-04-02 | End: 2020-04-02

## 2020-04-02 RX ORDER — 0.9 % SODIUM CHLORIDE 0.9 %
10 VIAL (ML) INJECTION ONCE
Status: CANCELLED | OUTPATIENT
Start: 2020-04-02

## 2020-04-02 RX ORDER — HEPARIN SODIUM (PORCINE) LOCK FLUSH IV SOLN 100 UNIT/ML 100 UNIT/ML
SOLUTION INTRAVENOUS
Status: COMPLETED
Start: 2020-04-02 | End: 2020-04-02

## 2020-04-02 RX ADMIN — HEPARIN SODIUM (PORCINE) LOCK FLUSH IV SOLN 100 UNIT/ML 500 UNITS: 100 SOLUTION INTRAVENOUS at 09:19:00

## 2020-04-02 NOTE — PROGRESS NOTES
STUDY:   ID # --[id number]  C2 D3     Saw patient in the infusion area for Cycle 2 Day 3 of Etoposide per protocol. Patients trunk and back rash is mild- grade1 - Maculo-papula rash with occasional itching. Per patient is better.  States  puts on

## 2020-04-02 NOTE — PROGRESS NOTES
Oncology Nutrition Assessment    Ht Readings from Last 1 Encounters:  03/31/20 : 152.4 cm (5')      Wt Readings from Last 1 Encounters:  03/31/20 : 61.1 kg (134 lb 12.8 oz)    BMI Calculated: There is no height or weight on file to calculate BMI.   Weight his chemo tx, therefore did not weigh pt this week. Weight a couple days ago and stable. Appetite and intake good. Fluid intake good. Denies significant side effects except tired and difficulty sleeping on chemo days due to steroids.   Tolerating treatmen

## 2020-04-02 NOTE — PROGRESS NOTES
Pt here for research study: C2D3  Etoposide along  with daily radiation.       Arrives Ambulating independently, accompanied by Self           Patient reports possible pregnancy since last therapy cycle: Not Applicable    Modifications in dose or schedule:
Olfactory hallucinations/Auditory hallucinations

## 2020-04-03 PROCEDURE — 77386 HC IMRT COMPLEX: CPT | Performed by: RADIOLOGY

## 2020-04-03 PROCEDURE — 77336 RADIATION PHYSICS CONSULT: CPT | Performed by: RADIOLOGY

## 2020-04-06 PROCEDURE — 77386 HC IMRT COMPLEX: CPT | Performed by: RADIOLOGY

## 2020-04-07 ENCOUNTER — RESEARCH ENCOUNTER (OUTPATIENT)
Dept: HEMATOLOGY/ONCOLOGY | Facility: HOSPITAL | Age: 60
End: 2020-04-07

## 2020-04-07 ENCOUNTER — NURSE ONLY (OUTPATIENT)
Dept: HEMATOLOGY/ONCOLOGY | Facility: HOSPITAL | Age: 60
End: 2020-04-07
Attending: INTERNAL MEDICINE
Payer: COMMERCIAL

## 2020-04-07 ENCOUNTER — OFFICE VISIT (OUTPATIENT)
Dept: RADIATION ONCOLOGY | Facility: HOSPITAL | Age: 60
End: 2020-04-07
Attending: RADIOLOGY
Payer: COMMERCIAL

## 2020-04-07 VITALS
SYSTOLIC BLOOD PRESSURE: 140 MMHG | OXYGEN SATURATION: 100 % | WEIGHT: 132.81 LBS | TEMPERATURE: 99 F | DIASTOLIC BLOOD PRESSURE: 65 MMHG | RESPIRATION RATE: 16 BRPM | HEART RATE: 72 BPM | BODY MASS INDEX: 26 KG/M2

## 2020-04-07 DIAGNOSIS — C34.92 SMALL CELL CARCINOMA OF LEFT LUNG (HCC): Primary | ICD-10-CM

## 2020-04-07 PROCEDURE — 85025 COMPLETE CBC W/AUTO DIFF WBC: CPT

## 2020-04-07 PROCEDURE — 80053 COMPREHEN METABOLIC PANEL: CPT

## 2020-04-07 PROCEDURE — 36415 COLL VENOUS BLD VENIPUNCTURE: CPT

## 2020-04-07 PROCEDURE — 77386 HC IMRT COMPLEX: CPT | Performed by: RADIOLOGY

## 2020-04-07 NOTE — PROGRESS NOTES
[facility] Radiation Treatment Management Note 16-20    Patient:  Estefany Alvarado. Age:  62year old  Visit Diagnosis:    1.  Small cell carcinoma of left lung [facility])      Primary Rad/Onc:  Dr. Chaparro Salmon    Site Delivered D carlos Larkin

## 2020-04-07 NOTE — PROGRESS NOTES
Study: NRG   Patient AH:--[id number]  Treatment Arm: Arm 2   Timepoint: C2D8     Patient here for C2D8 on [facility] .  Labs drawn per protocol. Seen by Dr. Fabio Blandon.       Patient reports no changes to medications.   Rash present to lower back and

## 2020-04-08 PROCEDURE — 77386 HC IMRT COMPLEX: CPT | Performed by: RADIOLOGY

## 2020-04-09 ENCOUNTER — DIETICIAN VISIT (OUTPATIENT)
Dept: NUTRITION | Facility: HOSPITAL | Age: 60
End: 2020-04-09

## 2020-04-09 VITALS — WEIGHT: 135.63 LBS | BODY MASS INDEX: 26 KG/M2

## 2020-04-09 PROCEDURE — 77386 HC IMRT COMPLEX: CPT | Performed by: RADIOLOGY

## 2020-04-09 NOTE — PROGRESS NOTES
Oncology Nutrition Assessment    Ht Readings from Last 1 Encounters:  03/31/20 : 152.4 cm (5')      Wt Readings from Last 1 Encounters:  04/09/20 : 61.5 kg (135 lb 9.6 oz)    BMI Calculated: Body mass index is 26.48 kg/m². Weight History:   Wt Readings fr did not weigh pt this week. Weight a couple days ago and stable. Appetite and intake good. Fluid intake good. Denies significant side effects except tired and difficulty sleeping on chemo days due to steroids. Tolerating treatments well.  No significant

## 2020-04-10 PROCEDURE — 77386 HC IMRT COMPLEX: CPT | Performed by: RADIOLOGY

## 2020-04-10 PROCEDURE — 77336 RADIATION PHYSICS CONSULT: CPT | Performed by: RADIOLOGY

## 2020-04-13 PROCEDURE — 77386 HC IMRT COMPLEX: CPT | Performed by: RADIOLOGY

## 2020-04-14 ENCOUNTER — RESEARCH ENCOUNTER (OUTPATIENT)
Dept: HEMATOLOGY/ONCOLOGY | Facility: HOSPITAL | Age: 60
End: 2020-04-14

## 2020-04-14 ENCOUNTER — NURSE ONLY (OUTPATIENT)
Dept: HEMATOLOGY/ONCOLOGY | Facility: HOSPITAL | Age: 60
End: 2020-04-14
Attending: INTERNAL MEDICINE
Payer: COMMERCIAL

## 2020-04-14 ENCOUNTER — OFFICE VISIT (OUTPATIENT)
Dept: RADIATION ONCOLOGY | Facility: HOSPITAL | Age: 60
End: 2020-04-14
Attending: RADIOLOGY
Payer: COMMERCIAL

## 2020-04-14 VITALS
HEART RATE: 68 BPM | DIASTOLIC BLOOD PRESSURE: 77 MMHG | HEIGHT: 60 IN | BODY MASS INDEX: 26.38 KG/M2 | OXYGEN SATURATION: 100 % | RESPIRATION RATE: 16 BRPM | WEIGHT: 134.38 LBS | SYSTOLIC BLOOD PRESSURE: 134 MMHG | TEMPERATURE: 98 F

## 2020-04-14 DIAGNOSIS — C34.92 SMALL CELL CARCINOMA OF LEFT LUNG (HCC): Primary | ICD-10-CM

## 2020-04-14 PROCEDURE — 85025 COMPLETE CBC W/AUTO DIFF WBC: CPT

## 2020-04-14 PROCEDURE — 80053 COMPREHEN METABOLIC PANEL: CPT

## 2020-04-14 PROCEDURE — 85060 BLOOD SMEAR INTERPRETATION: CPT

## 2020-04-14 PROCEDURE — 77386 HC IMRT COMPLEX: CPT | Performed by: RADIOLOGY

## 2020-04-14 PROCEDURE — 36415 COLL VENOUS BLD VENIPUNCTURE: CPT

## 2020-04-14 NOTE — PROGRESS NOTES
[facility] Radiation Treatment Management Note 21-25    Patient:  Ynes Rock. Age:  62year old  Visit Diagnosis:    1.  Small cell carcinoma of left lung [facility])      Primary Rad/Onc:  Dr. Aung Vasques    Site Delivered D 6.4 oz)  04/09/20 : 61.5 kg (135 lb 9.6 oz)  04/07/20 : 60.2 kg (132 lb 12.8 oz)  03/31/20 : 61.1 kg (134 lb 12.8 oz)  03/31/20 : 61.1 kg (134 lb 12.8 oz)  03/30/20 : 60.6 kg (133 lb 9.6 oz)        Dre Ventura RN    Physician Note:  Subjective:  -doing [General Appearance - In No Acute Distress] : in no acute distress [General Appearance - Alert] : alert [Sclera] : the sclera and conjunctiva were normal [PERRL With Normal Accommodation] : pupils were equal in size, round, and reactive to light [Extraocular Movements] : extraocular movements were intact [Outer Ear] : the ears and nose were normal in appearance [Oropharynx] : the oropharynx was normal [Neck Appearance] : the appearance of the neck was normal [Jugular Venous Distention Increased] : there was no jugular-venous distention [Neck Cervical Mass (___cm)] : no neck mass was observed [Thyroid Nodule] : there were no palpable thyroid nodules [Thyroid Diffuse Enlargement] : the thyroid was not enlarged [Auscultation Breath Sounds / Voice Sounds] : lungs were clear to auscultation bilaterally [Heart Rate And Rhythm] : heart rate was normal and rhythm regular [Heart Sounds] : normal S1 and S2 [Heart Sounds Gallop] : no gallops [Murmurs] : no murmurs [Arterial Pulses Carotid] : carotid pulses were normal with no bruits [Heart Sounds Pericardial Friction Rub] : no pericardial rub [Abdominal Aorta] : the abdominal aorta was normal [Full Pulse] : the pedal pulses are present [Arterial Pulses Femoral] : femoral pulses were normal without bruits [Veins - Varicosity Changes] : there were no varicosital changes [Edema] : there was no peripheral edema [Bowel Sounds] : normal bowel sounds [Abdomen Tenderness] : non-tender [Abdomen Soft] : soft [Abdomen Mass (___ Cm)] : no abdominal mass palpated [Cervical Lymph Nodes Enlarged Posterior Bilaterally] : posterior cervical [Cervical Lymph Nodes Enlarged Anterior Bilaterally] : anterior cervical [Axillary Lymph Nodes Enlarged Bilaterally] : axillary [Supraclavicular Lymph Nodes Enlarged Bilaterally] : supraclavicular [Femoral Lymph Nodes Enlarged Bilaterally] : femoral [Inguinal Lymph Nodes Enlarged Bilaterally] : inguinal [No Spinal Tenderness] : no spinal tenderness [Abnormal Walk] : normal gait [Nail Clubbing] : no clubbing  or cyanosis of the fingernails [Musculoskeletal - Swelling] : no joint swelling seen [Motor Tone] : muscle strength and tone were normal [Skin Color & Pigmentation] : normal skin color and pigmentation [Skin Turgor] : normal skin turgor [] : no rash [Right Foot Was Examined] : right foot was examined [Left Foot Was Examined] : left foot was examined [Normal] : normal [2+] : 2+ in the dorsalis pedis [Oriented To Time, Place, And Person] : oriented to person, place, and time [No Focal Deficits] : no focal deficits [Impaired Insight] : insight and judgment were intact [Affect] : the affect was normal [FreeTextEntry1] : Obese [Normal in Appearance] : not normal in appearance [Normal Appearance] : not normal in appearance [#1 Diminished] : number 1 was normal [#2 Diminished] : number 2 was normal [#3 Diminished] : number 3 was normal [#4 Diminished] : number 4 was normal [#5 Diminished] : number 5 was normal [#6 Diminished] : number 6 was normal [#7 Diminished] : number 7 was normal [#8 Diminished] : number 8 was normal [#9 Diminished] : number 9 was normal [#10 Diminished] : number 10 was normal [Over the Past 2 Weeks, Have You Felt Little Interest or Pleasure Doing Things?] : 2.) Over the past 2 weeks, have you felt little interest or pleasure doing things? No [Over the Past 2 Weeks, Have You Felt Down, Depressed, or Hopeless?] : 1.) Over the past 2 weeks, have you felt down, depressed, or hopeless? No

## 2020-04-14 NOTE — PROGRESS NOTES
Study: NRG   Patient MF:--[id number]  Treatment Arm: Arm 2   Timepoint: C2D15     Patient here for C2D15 on NRG . Non-fasting labs drawn per protocol.  Seen by Dr. Monetta Kussmaul.       Patient reports no changes to medications.   Rash unchanged to lo

## 2020-04-15 PROCEDURE — 77386 HC IMRT COMPLEX: CPT | Performed by: RADIOLOGY

## 2020-04-16 ENCOUNTER — DIETICIAN VISIT (OUTPATIENT)
Dept: NUTRITION | Facility: HOSPITAL | Age: 60
End: 2020-04-16

## 2020-04-16 VITALS — BODY MASS INDEX: 26 KG/M2 | WEIGHT: 133 LBS

## 2020-04-16 PROCEDURE — 77386 HC IMRT COMPLEX: CPT | Performed by: RADIOLOGY

## 2020-04-16 NOTE — PROGRESS NOTES
Oncology Nutrition Assessment    Ht Readings from Last 1 Encounters:  04/14/20 : 152.4 cm (5')      Wt Readings from Last 1 Encounters:  04/16/20 : 60.3 kg (133 lb)    BMI Calculated: Body mass index is 25.97 kg/m². Weight History:   Wt Readings from Last this week. Weight a couple days ago and stable. Appetite and intake good. Fluid intake good. Denies significant side effects except tired and difficulty sleeping on chemo days due to steroids. Tolerating treatments well. No significant c/o's.   [facility]  4/9/

## 2020-04-17 PROCEDURE — 77336 RADIATION PHYSICS CONSULT: CPT | Performed by: RADIOLOGY

## 2020-04-17 PROCEDURE — 77386 HC IMRT COMPLEX: CPT | Performed by: RADIOLOGY

## 2020-04-20 ENCOUNTER — RESEARCH ENCOUNTER (OUTPATIENT)
Dept: HEMATOLOGY/ONCOLOGY | Facility: HOSPITAL | Age: 60
End: 2020-04-20

## 2020-04-20 ENCOUNTER — OFFICE VISIT (OUTPATIENT)
Dept: HEMATOLOGY/ONCOLOGY | Facility: HOSPITAL | Age: 60
End: 2020-04-20
Attending: INTERNAL MEDICINE
Payer: COMMERCIAL

## 2020-04-20 VITALS
WEIGHT: 135 LBS | RESPIRATION RATE: 16 BRPM | HEART RATE: 65 BPM | HEIGHT: 60 IN | SYSTOLIC BLOOD PRESSURE: 128 MMHG | TEMPERATURE: 98 F | DIASTOLIC BLOOD PRESSURE: 73 MMHG | BODY MASS INDEX: 26.5 KG/M2 | OXYGEN SATURATION: 100 %

## 2020-04-20 DIAGNOSIS — R21 RASH: ICD-10-CM

## 2020-04-20 DIAGNOSIS — Z51.11 CHEMOTHERAPY MANAGEMENT, ENCOUNTER FOR: ICD-10-CM

## 2020-04-20 DIAGNOSIS — Z45.2 ENCOUNTER FOR CARE RELATED TO VASCULAR ACCESS PORT: ICD-10-CM

## 2020-04-20 DIAGNOSIS — Z00.6 CLINICAL TRIAL EXAM: ICD-10-CM

## 2020-04-20 DIAGNOSIS — T45.1X5A CHEMOTHERAPY INDUCED NEUTROPENIA (HCC): ICD-10-CM

## 2020-04-20 DIAGNOSIS — C34.92 SMALL CELL CARCINOMA OF LEFT LUNG (HCC): Primary | ICD-10-CM

## 2020-04-20 DIAGNOSIS — D70.1 CHEMOTHERAPY INDUCED NEUTROPENIA (HCC): ICD-10-CM

## 2020-04-20 PROCEDURE — 99215 OFFICE O/P EST HI 40 MIN: CPT | Performed by: NURSE PRACTITIONER

## 2020-04-20 PROCEDURE — 36593 DECLOT VASCULAR DEVICE: CPT

## 2020-04-20 PROCEDURE — A4216 STERILE WATER/SALINE, 10 ML: HCPCS

## 2020-04-20 PROCEDURE — 36415 COLL VENOUS BLD VENIPUNCTURE: CPT

## 2020-04-20 PROCEDURE — 84443 ASSAY THYROID STIM HORMONE: CPT

## 2020-04-20 PROCEDURE — 80053 COMPREHEN METABOLIC PANEL: CPT

## 2020-04-20 PROCEDURE — 85007 BL SMEAR W/DIFF WBC COUNT: CPT

## 2020-04-20 PROCEDURE — 77386 HC IMRT COMPLEX: CPT | Performed by: RADIOLOGY

## 2020-04-20 PROCEDURE — 85027 COMPLETE CBC AUTOMATED: CPT

## 2020-04-20 PROCEDURE — 36591 DRAW BLOOD OFF VENOUS DEVICE: CPT

## 2020-04-20 PROCEDURE — 85025 COMPLETE CBC W/AUTO DIFF WBC: CPT

## 2020-04-20 RX ORDER — SODIUM CHLORIDE 9 MG/ML
1000 INJECTION, SOLUTION INTRAVENOUS ONCE
Status: CANCELLED | OUTPATIENT
Start: 2020-04-21

## 2020-04-20 RX ORDER — 0.9 % SODIUM CHLORIDE 0.9 %
VIAL (ML) INJECTION
Status: DISCONTINUED
Start: 2020-04-20 | End: 2020-04-20

## 2020-04-20 RX ORDER — HEPARIN SODIUM (PORCINE) LOCK FLUSH IV SOLN 100 UNIT/ML 100 UNIT/ML
5 SOLUTION INTRAVENOUS ONCE
Status: COMPLETED | OUTPATIENT
Start: 2020-04-20 | End: 2020-04-20

## 2020-04-20 RX ORDER — 0.9 % SODIUM CHLORIDE 0.9 %
10 VIAL (ML) INJECTION ONCE
Status: CANCELLED | OUTPATIENT
Start: 2020-04-20

## 2020-04-20 RX ORDER — HEPARIN SODIUM (PORCINE) LOCK FLUSH IV SOLN 100 UNIT/ML 100 UNIT/ML
5 SOLUTION INTRAVENOUS ONCE
Status: CANCELLED | OUTPATIENT
Start: 2020-04-20

## 2020-04-20 RX ADMIN — HEPARIN SODIUM (PORCINE) LOCK FLUSH IV SOLN 100 UNIT/ML 500 UNITS: 100 SOLUTION INTRAVENOUS at 09:40:00

## 2020-04-20 NOTE — PROGRESS NOTES
Study: NRG   Patient DD:--[id number]  Treatment Arm: Arm 2   Timepoint: C3D1     Patient here for C3D1 on NRG . Non-fasting labs drawn per protocol.  Seen by GENESIS Lima for exam.        Patient reports no changes to medications.

## 2020-04-20 NOTE — PROGRESS NOTES
[facility] Progress Note    Patient Name: Arvind Oliver. YOB: 1960   Medical Record Number: [id number]   Attending Physician: Candace Castano M.D.      Chief Complaint:  Small cell lung cancer    History of Present Illness:  Cancer h education level: Not on file    Tobacco Use      Smoking status: Former Smoker        Packs/day: 1.50        Years: 20.00        Pack years: 27        Quit date: 2000        Years since quittin.2      Smokeless tobacco: Never Used    Substance and chemo-infusion, 120 mg/m2 (Treatment Plan Recorded), Intravenous, Once, Ricardo Booker MD, Stopped at 04/02/20 0918  [COMPLETED] Heparin Sodium Lock Flush 100 UNIT/ML injection 500 Units, 5 mL, Intracatheter, Once, Ricardo Booker MD, 500 Units at 04/02/20 MD VINCENT, Stopped at 03/31/20 1640  [COMPLETED] alteplase (ACTIVASE) injection 2 mg, 2 mg, Intravenous, Once, Shilpa Mathews MD, 2 mg at 03/31/20 0830  [COMPLETED] Heparin Sodium Lock Flush 100 UNIT/ML injection 500 Units, 5 mL, Intracatheter, Once, Diamond Arriola patient. Impression and Plan:  66-year-old male being evaluated by Medical Oncology for limited-stage small cell lung cancer of the left lung (stage IIIB T4N2).   He originally had outside studies including chest x-ray and CT that showed a left lung mass

## 2020-04-21 ENCOUNTER — OFFICE VISIT (OUTPATIENT)
Dept: RADIATION ONCOLOGY | Facility: HOSPITAL | Age: 60
End: 2020-04-21
Attending: RADIOLOGY
Payer: COMMERCIAL

## 2020-04-21 ENCOUNTER — OFFICE VISIT (OUTPATIENT)
Dept: HEMATOLOGY/ONCOLOGY | Facility: HOSPITAL | Age: 60
End: 2020-04-21
Attending: INTERNAL MEDICINE
Payer: COMMERCIAL

## 2020-04-21 VITALS
HEIGHT: 60 IN | DIASTOLIC BLOOD PRESSURE: 77 MMHG | SYSTOLIC BLOOD PRESSURE: 139 MMHG | WEIGHT: 137 LBS | OXYGEN SATURATION: 100 % | TEMPERATURE: 97 F | HEART RATE: 70 BPM | BODY MASS INDEX: 26.9 KG/M2 | RESPIRATION RATE: 18 BRPM

## 2020-04-21 VITALS
HEART RATE: 72 BPM | TEMPERATURE: 98 F | HEIGHT: 60 IN | OXYGEN SATURATION: 100 % | WEIGHT: 137.19 LBS | RESPIRATION RATE: 16 BRPM | BODY MASS INDEX: 26.93 KG/M2 | SYSTOLIC BLOOD PRESSURE: 122 MMHG | DIASTOLIC BLOOD PRESSURE: 60 MMHG

## 2020-04-21 DIAGNOSIS — Z45.2 ENCOUNTER FOR CARE RELATED TO VASCULAR ACCESS PORT: ICD-10-CM

## 2020-04-21 DIAGNOSIS — C34.92 SMALL CELL CARCINOMA OF LEFT LUNG (HCC): Primary | ICD-10-CM

## 2020-04-21 PROCEDURE — 77386 HC IMRT COMPLEX: CPT | Performed by: RADIOLOGY

## 2020-04-21 PROCEDURE — 96361 HYDRATE IV INFUSION ADD-ON: CPT

## 2020-04-21 PROCEDURE — 96366 THER/PROPH/DIAG IV INF ADDON: CPT

## 2020-04-21 PROCEDURE — 96413 CHEMO IV INFUSION 1 HR: CPT

## 2020-04-21 PROCEDURE — 96417 CHEMO IV INFUS EACH ADDL SEQ: CPT

## 2020-04-21 PROCEDURE — 96367 TX/PROPH/DG ADDL SEQ IV INF: CPT

## 2020-04-21 PROCEDURE — 96375 TX/PRO/DX INJ NEW DRUG ADDON: CPT

## 2020-04-21 RX ORDER — 0.9 % SODIUM CHLORIDE 0.9 %
10 VIAL (ML) INJECTION ONCE
Status: DISCONTINUED | OUTPATIENT
Start: 2020-04-21 | End: 2020-04-21

## 2020-04-21 RX ORDER — HEPARIN SODIUM (PORCINE) LOCK FLUSH IV SOLN 100 UNIT/ML 100 UNIT/ML
SOLUTION INTRAVENOUS
Status: COMPLETED
Start: 2020-04-21 | End: 2020-04-21

## 2020-04-21 RX ORDER — SODIUM CHLORIDE 9 MG/ML
1000 INJECTION, SOLUTION INTRAVENOUS ONCE
Status: COMPLETED | OUTPATIENT
Start: 2020-04-21 | End: 2020-04-21

## 2020-04-21 RX ORDER — HEPARIN SODIUM (PORCINE) LOCK FLUSH IV SOLN 100 UNIT/ML 100 UNIT/ML
5 SOLUTION INTRAVENOUS ONCE
Status: CANCELLED | OUTPATIENT
Start: 2020-04-21

## 2020-04-21 RX ORDER — 0.9 % SODIUM CHLORIDE 0.9 %
VIAL (ML) INJECTION
Status: DISCONTINUED
Start: 2020-04-21 | End: 2020-04-21

## 2020-04-21 RX ORDER — HEPARIN SODIUM (PORCINE) LOCK FLUSH IV SOLN 100 UNIT/ML 100 UNIT/ML
5 SOLUTION INTRAVENOUS ONCE
Status: COMPLETED | OUTPATIENT
Start: 2020-04-21 | End: 2020-04-21

## 2020-04-21 RX ORDER — 0.9 % SODIUM CHLORIDE 0.9 %
10 VIAL (ML) INJECTION ONCE
Status: CANCELLED | OUTPATIENT
Start: 2020-04-21

## 2020-04-21 RX ADMIN — SODIUM CHLORIDE 1000 ML: 9 INJECTION, SOLUTION INTRAVENOUS at 13:14:00

## 2020-04-21 RX ADMIN — HEPARIN SODIUM (PORCINE) LOCK FLUSH IV SOLN 100 UNIT/ML 500 UNITS: 100 SOLUTION INTRAVENOUS at 16:22:00

## 2020-04-21 NOTE — PATIENT INSTRUCTIONS
CT Scan: 5/11/2020 at 8:30am.  Nothing to eat or drink (except water) for 2 hours before appointment. Please arrive 15 minutes early. You will not need contrast for this scan.

## 2020-04-21 NOTE — PROGRESS NOTES
[facility] Radiation Treatment Management Note 26-30    Patient:  Eli Cohn. Age:  62year old  Visit Diagnosis:    1.  Small cell carcinoma of left lung [facility])      Primary Rad/Onc:  Dr. Qian Hernandez    Site Delivered D seen in weekly visit with Dr Yadira Phelan. Will complete radiation this week. Chemo today. Labs noted from yesterday. ANC 1.27. CT chest to be done in 3 weeks post chemo. Wt stable overall. Wt Readings from Last 6 Encounters:  04/21/20 : 62.2 kg (137 lb 3. 2

## 2020-04-21 NOTE — PROGRESS NOTES
Pt here for C3D1 Cisplatin, Etoposide Tecentriq.   Arrives Ambulating independently, accompanied by Self         Had RT already this am and MD visit with RT MD.  Patient reports possible pregnancy since last therapy cycle: Not Applicable    Modifications in

## 2020-04-21 NOTE — PATIENT INSTRUCTIONS
Please call [phone] and make an appointment with Dr Chuck Frausto in 3 months at the end of June. Please call with any questions or concerns.

## 2020-04-22 ENCOUNTER — OFFICE VISIT (OUTPATIENT)
Dept: HEMATOLOGY/ONCOLOGY | Facility: HOSPITAL | Age: 60
End: 2020-04-22
Attending: INTERNAL MEDICINE
Payer: COMMERCIAL

## 2020-04-22 ENCOUNTER — RESEARCH ENCOUNTER (OUTPATIENT)
Dept: HEMATOLOGY/ONCOLOGY | Facility: HOSPITAL | Age: 60
End: 2020-04-22

## 2020-04-22 VITALS
TEMPERATURE: 98 F | RESPIRATION RATE: 18 BRPM | OXYGEN SATURATION: 100 % | HEART RATE: 71 BPM | SYSTOLIC BLOOD PRESSURE: 114 MMHG | DIASTOLIC BLOOD PRESSURE: 58 MMHG

## 2020-04-22 DIAGNOSIS — Z45.2 ENCOUNTER FOR CARE RELATED TO VASCULAR ACCESS PORT: ICD-10-CM

## 2020-04-22 DIAGNOSIS — C34.92 SMALL CELL CARCINOMA OF LEFT LUNG (HCC): Primary | ICD-10-CM

## 2020-04-22 PROCEDURE — 96367 TX/PROPH/DG ADDL SEQ IV INF: CPT

## 2020-04-22 PROCEDURE — 96413 CHEMO IV INFUSION 1 HR: CPT

## 2020-04-22 PROCEDURE — 77386 HC IMRT COMPLEX: CPT | Performed by: RADIOLOGY

## 2020-04-22 RX ORDER — 0.9 % SODIUM CHLORIDE 0.9 %
10 VIAL (ML) INJECTION ONCE
Status: CANCELLED | OUTPATIENT
Start: 2020-04-22

## 2020-04-22 RX ORDER — HEPARIN SODIUM (PORCINE) LOCK FLUSH IV SOLN 100 UNIT/ML 100 UNIT/ML
5 SOLUTION INTRAVENOUS ONCE
Status: CANCELLED | OUTPATIENT
Start: 2020-04-22

## 2020-04-22 RX ORDER — HEPARIN SODIUM (PORCINE) LOCK FLUSH IV SOLN 100 UNIT/ML 100 UNIT/ML
SOLUTION INTRAVENOUS
Status: COMPLETED
Start: 2020-04-22 | End: 2020-04-22

## 2020-04-22 RX ORDER — 0.9 % SODIUM CHLORIDE 0.9 %
VIAL (ML) INJECTION
Status: DISCONTINUED
Start: 2020-04-22 | End: 2020-04-22

## 2020-04-22 RX ORDER — HEPARIN SODIUM (PORCINE) LOCK FLUSH IV SOLN 100 UNIT/ML 100 UNIT/ML
5 SOLUTION INTRAVENOUS ONCE
Status: COMPLETED | OUTPATIENT
Start: 2020-04-22 | End: 2020-04-22

## 2020-04-22 RX ADMIN — HEPARIN SODIUM (PORCINE) LOCK FLUSH IV SOLN 100 UNIT/ML 500 UNITS: 100 SOLUTION INTRAVENOUS at 09:48:00

## 2020-04-22 NOTE — PROGRESS NOTES
Study: [facility]   Patient SZ:--[id number]  Treatment Arm: Arm 2   Timepoint: C3D2     Patient here for C3D2 on [facility] .      Patient reports no changes to medications.  Rash flaring up after yesterdays infusion, still limited to lower back and L lowe

## 2020-04-22 NOTE — PROGRESS NOTES
Pt here for C3D2 Etoposide.   Arrives Ambulating independently, accompanied by Self           Patient reports possible pregnancy since last therapy cycle: Not Applicable    Modifications in dose or schedule: No     Frequency of blood return and site check t

## 2020-04-23 ENCOUNTER — DIETICIAN VISIT (OUTPATIENT)
Dept: NUTRITION | Facility: HOSPITAL | Age: 60
End: 2020-04-23

## 2020-04-23 ENCOUNTER — OFFICE VISIT (OUTPATIENT)
Dept: HEMATOLOGY/ONCOLOGY | Facility: HOSPITAL | Age: 60
End: 2020-04-23
Attending: INTERNAL MEDICINE
Payer: COMMERCIAL

## 2020-04-23 VITALS
OXYGEN SATURATION: 99 % | DIASTOLIC BLOOD PRESSURE: 52 MMHG | RESPIRATION RATE: 18 BRPM | TEMPERATURE: 98 F | HEART RATE: 78 BPM | SYSTOLIC BLOOD PRESSURE: 118 MMHG

## 2020-04-23 DIAGNOSIS — C34.92 SMALL CELL CARCINOMA OF LEFT LUNG (HCC): Primary | ICD-10-CM

## 2020-04-23 DIAGNOSIS — Z45.2 ENCOUNTER FOR CARE RELATED TO VASCULAR ACCESS PORT: ICD-10-CM

## 2020-04-23 PROCEDURE — 77386 HC IMRT COMPLEX: CPT | Performed by: RADIOLOGY

## 2020-04-23 PROCEDURE — 96367 TX/PROPH/DG ADDL SEQ IV INF: CPT

## 2020-04-23 PROCEDURE — 96413 CHEMO IV INFUSION 1 HR: CPT

## 2020-04-23 RX ORDER — HEPARIN SODIUM (PORCINE) LOCK FLUSH IV SOLN 100 UNIT/ML 100 UNIT/ML
5 SOLUTION INTRAVENOUS ONCE
Status: CANCELLED | OUTPATIENT
Start: 2020-04-23

## 2020-04-23 RX ORDER — HEPARIN SODIUM (PORCINE) LOCK FLUSH IV SOLN 100 UNIT/ML 100 UNIT/ML
5 SOLUTION INTRAVENOUS ONCE
Status: COMPLETED | OUTPATIENT
Start: 2020-04-23 | End: 2020-04-23

## 2020-04-23 RX ORDER — 0.9 % SODIUM CHLORIDE 0.9 %
10 VIAL (ML) INJECTION ONCE
Status: CANCELLED | OUTPATIENT
Start: 2020-04-23

## 2020-04-23 RX ORDER — HEPARIN SODIUM (PORCINE) LOCK FLUSH IV SOLN 100 UNIT/ML 100 UNIT/ML
SOLUTION INTRAVENOUS
Status: COMPLETED
Start: 2020-04-23 | End: 2020-04-23

## 2020-04-23 RX ORDER — 0.9 % SODIUM CHLORIDE 0.9 %
VIAL (ML) INJECTION
Status: DISCONTINUED
Start: 2020-04-23 | End: 2020-04-23

## 2020-04-23 RX ADMIN — HEPARIN SODIUM (PORCINE) LOCK FLUSH IV SOLN 100 UNIT/ML 500 UNITS: 100 SOLUTION INTRAVENOUS at 11:42:00

## 2020-04-23 NOTE — PROGRESS NOTES
Oncology Nutrition Assessment    Ht Readings from Last 1 Encounters:  04/21/20 : 152.4 cm (5')      Wt Readings from Last 1 Encounters:  04/21/20 : 62.2 kg (137 lb 3.2 oz)    BMI Calculated: There is no height or weight on file to calculate BMI.   Weight H did not weigh pt this week. Weight a couple days ago and stable. Appetite and intake good. Fluid intake good. Denies significant side effects except tired and difficulty sleeping on chemo days due to steroids. Tolerating treatments well.  No significant

## 2020-04-23 NOTE — PROGRESS NOTES
Completed treatment today with no s/s of adverse reaction. Refer to charting of port, discharged stable from infusion, ambulating steady.

## 2020-04-23 NOTE — PROGRESS NOTES
Pt here for C3D3 Etoposide.   Arrives Ambulating independently, accompanied by Self           Patient reports possible pregnancy since last therapy cycle: Not Applicable    Modifications in dose or schedule: No     Frequency of blood return and site check t

## 2020-04-24 PROCEDURE — 77336 RADIATION PHYSICS CONSULT: CPT | Performed by: RADIOLOGY

## 2020-04-24 PROCEDURE — 77386 HC IMRT COMPLEX: CPT | Performed by: RADIOLOGY

## 2020-04-28 ENCOUNTER — OFFICE VISIT (OUTPATIENT)
Dept: HEMATOLOGY/ONCOLOGY | Facility: HOSPITAL | Age: 60
End: 2020-04-28
Attending: INTERNAL MEDICINE
Payer: COMMERCIAL

## 2020-04-28 ENCOUNTER — RESEARCH ENCOUNTER (OUTPATIENT)
Dept: HEMATOLOGY/ONCOLOGY | Facility: HOSPITAL | Age: 60
End: 2020-04-28

## 2020-04-28 VITALS
HEART RATE: 61 BPM | BODY MASS INDEX: 26.11 KG/M2 | HEIGHT: 60 IN | RESPIRATION RATE: 16 BRPM | SYSTOLIC BLOOD PRESSURE: 111 MMHG | DIASTOLIC BLOOD PRESSURE: 65 MMHG | WEIGHT: 133 LBS | OXYGEN SATURATION: 99 %

## 2020-04-28 DIAGNOSIS — C34.92 SMALL CELL CARCINOMA OF LEFT LUNG (HCC): Primary | ICD-10-CM

## 2020-04-28 DIAGNOSIS — Z45.2 ENCOUNTER FOR CARE RELATED TO VASCULAR ACCESS PORT: ICD-10-CM

## 2020-04-28 DIAGNOSIS — Z51.11 CHEMOTHERAPY MANAGEMENT, ENCOUNTER FOR: ICD-10-CM

## 2020-04-28 DIAGNOSIS — Z00.6 CLINICAL TRIAL EXAM: ICD-10-CM

## 2020-04-28 DIAGNOSIS — R21 RASH: ICD-10-CM

## 2020-04-28 DIAGNOSIS — T45.1X5A ANTINEOPLASTIC CHEMOTHERAPY INDUCED ANEMIA: ICD-10-CM

## 2020-04-28 DIAGNOSIS — D64.81 ANTINEOPLASTIC CHEMOTHERAPY INDUCED ANEMIA: ICD-10-CM

## 2020-04-28 PROCEDURE — 85025 COMPLETE CBC W/AUTO DIFF WBC: CPT

## 2020-04-28 PROCEDURE — 80053 COMPREHEN METABOLIC PANEL: CPT

## 2020-04-28 PROCEDURE — 99215 OFFICE O/P EST HI 40 MIN: CPT | Performed by: NURSE PRACTITIONER

## 2020-04-28 PROCEDURE — 36591 DRAW BLOOD OFF VENOUS DEVICE: CPT

## 2020-04-28 RX ORDER — 0.9 % SODIUM CHLORIDE 0.9 %
VIAL (ML) INJECTION
Status: DISCONTINUED
Start: 2020-04-28 | End: 2020-04-28

## 2020-04-28 RX ORDER — HEPARIN SODIUM (PORCINE) LOCK FLUSH IV SOLN 100 UNIT/ML 100 UNIT/ML
5 SOLUTION INTRAVENOUS ONCE
Status: CANCELLED | OUTPATIENT
Start: 2020-04-28

## 2020-04-28 RX ORDER — 0.9 % SODIUM CHLORIDE 0.9 %
10 VIAL (ML) INJECTION ONCE
Status: CANCELLED | OUTPATIENT
Start: 2020-04-28

## 2020-04-28 RX ORDER — HEPARIN SODIUM (PORCINE) LOCK FLUSH IV SOLN 100 UNIT/ML 100 UNIT/ML
5 SOLUTION INTRAVENOUS ONCE
Status: COMPLETED | OUTPATIENT
Start: 2020-04-28 | End: 2020-04-28

## 2020-04-28 RX ADMIN — HEPARIN SODIUM (PORCINE) LOCK FLUSH IV SOLN 100 UNIT/ML 500 UNITS: 100 SOLUTION INTRAVENOUS at 08:44:00

## 2020-04-28 NOTE — PROGRESS NOTES
Study: Kathrine Godwin  Patient MU:--[id number]  Treatment Arm: Arm 2   Timepoint: C3D8     Patient here for C3D8 on NRG .      Rash unchanged, still applying Triamcinolone cream QHS with relief of itching.  Intermittent nausea after last cycle of chem

## 2020-04-28 NOTE — PROGRESS NOTES
[facility] Progress Note    Patient Name: Kely Harrison. YOB: 1960   Medical Record Number: [id number]   Attending Physician: Bro Schroeder M.D.      Chief Complaint:  Small cell lung cancer    History of Present Illness:  Cancer h History    Socioeconomic History      Marital status: Unknown      Spouse name: Not on file      Number of children: Not on file      Years of education: Not on file      Highest education level: Not on file    Tobacco Use      Smoking status: Former Smoke before breakfast., Disp: 30 tablet, Rfl: 1  MISC [employer] OP, Take 50 mg by mouth nightly. Pt to take for 2 more weeks. ..  , Disp: , Rfl:     [COMPLETED] ondansetron HCl (ZOFRAN) 8 mg, Dexamethasone Sodium Phosphate (DECADRON) 6 mg in sodium chlorid 1205  [COMPLETED] CISplatin (PLATINOL) 60 mg/m2 = 93 mg in sodium chloride 0.9% 343 mL chemo-infusion, 60 mg/m2 (Treatment Plan Recorded), Intravenous, Once, JOHANNA Chun, Stopped at 04/21/20 1306  [COMPLETED] 0.9% NaCl infusion, 1,000 mL, Intra MD, 500 Units at 04/01/20 1000  [COMPLETED] atezolizumab (NRG- Study Drug) 1,200 mg in sodium chloride 0.9% 270 mL chemo infuson, 1,200 mg, Intravenous, Once, Gabriel Narvaez MD, Stopped at 03/31/20 0911  [COMPLETED] Fosaprepitant Dimeglumine (EMEND) 1 Neck: No JVD. No palpable lymphadenopathy. Neck is supple. Lymphatics: There is no palpable peripheral lymphadenopathy   Chest: Symmetric expansion, nonlabored breathing  Cardiovascular: Regular with palpable distal pulses   Abdomen: Soft, non tender. on chemotherapy--grade 1, now resolved. Monitor. --Anemia on chemo-grade 1, stable, asymptomatic. Monitor  –Prior hyperkalemia improved with dietary adjustments. –Grade 1 rash lower back, using topical treatments w/ topical steroids. Stable.   --Grade 1

## 2020-05-04 NOTE — PROGRESS NOTES
[facility]    PATIENT'S NAME: Michelle Akbar   RADIATION ONCOLOGIST: Ness Reynaga.  Yadira Phelan MD   PATIENT ACCOUNT #: [de-identified] LOCATION: [address] [place] [address] RECORD #: [id number] YOB: 1960   DATE: 04/24/2020       RADIATION on 03/10/2020 and completed on 04/24/2024 for a total of 46 elapsed days in which he received all 33 prescribed radiation treatments.     TREATMENT SUMMARY:  The patient was treated definitively utilizing concomitant chemoradiotherapy for his limited stage

## 2020-05-05 ENCOUNTER — OFFICE VISIT (OUTPATIENT)
Dept: HEMATOLOGY/ONCOLOGY | Facility: HOSPITAL | Age: 60
End: 2020-05-05
Attending: INTERNAL MEDICINE
Payer: COMMERCIAL

## 2020-05-05 ENCOUNTER — RESEARCH ENCOUNTER (OUTPATIENT)
Dept: HEMATOLOGY/ONCOLOGY | Facility: HOSPITAL | Age: 60
End: 2020-05-05

## 2020-05-05 ENCOUNTER — TELEPHONE (OUTPATIENT)
Dept: HEMATOLOGY/ONCOLOGY | Facility: HOSPITAL | Age: 60
End: 2020-05-05

## 2020-05-05 VITALS
DIASTOLIC BLOOD PRESSURE: 60 MMHG | HEIGHT: 60 IN | HEART RATE: 64 BPM | TEMPERATURE: 97 F | RESPIRATION RATE: 16 BRPM | BODY MASS INDEX: 26.41 KG/M2 | OXYGEN SATURATION: 100 % | SYSTOLIC BLOOD PRESSURE: 111 MMHG | WEIGHT: 134.5 LBS

## 2020-05-05 DIAGNOSIS — T45.1X5A CHEMOTHERAPY INDUCED NEUTROPENIA (HCC): ICD-10-CM

## 2020-05-05 DIAGNOSIS — C34.92 SMALL CELL CARCINOMA OF LEFT LUNG (HCC): Primary | ICD-10-CM

## 2020-05-05 DIAGNOSIS — D70.1 CHEMOTHERAPY INDUCED NEUTROPENIA (HCC): ICD-10-CM

## 2020-05-05 DIAGNOSIS — R21 RASH: ICD-10-CM

## 2020-05-05 DIAGNOSIS — Z00.6 CLINICAL TRIAL EXAM: ICD-10-CM

## 2020-05-05 PROCEDURE — 85060 BLOOD SMEAR INTERPRETATION: CPT

## 2020-05-05 PROCEDURE — 80053 COMPREHEN METABOLIC PANEL: CPT

## 2020-05-05 PROCEDURE — 99214 OFFICE O/P EST MOD 30 MIN: CPT | Performed by: INTERNAL MEDICINE

## 2020-05-05 PROCEDURE — 85025 COMPLETE CBC W/AUTO DIFF WBC: CPT

## 2020-05-05 PROCEDURE — 36415 COLL VENOUS BLD VENIPUNCTURE: CPT

## 2020-05-05 NOTE — PROGRESS NOTES
Study: Baldo Cuellar  Patient WK:--[id number]  Treatment Arm: Arm 2   Timepoint: C3D15     Patient here for C3D15 on NRG .      Rash improved, still applying Triamcinolone cream QHS.   Intermittent nausea after last cycle of chemo relieved by Zofran a

## 2020-05-05 NOTE — PROGRESS NOTES
[facility] Progress Note    Patient Name: Kathryn Boyce. YOB: 1960   Medical Record Number: [id number]   Attending Physician: Michele Quinn M.D.      Chief Complaint:  Small cell lung cancer    History of Present Illness:  Cancer h 20.00        Pack years: 27        Quit date: 2000        Years since quittin.2      Smokeless tobacco: Never Used    Substance and Sexual Activity      Alcohol use:  Yes        Alcohol/week: 7.0 standard drinks        Types: 7 Cans of beer per wee 0844  [COMPLETED] ondansetron HCl (ZOFRAN) 8 mg, Dexamethasone Sodium Phosphate (DECADRON) 6 mg in sodium chloride 0.9% 104.6 mL IVPB, , Intravenous, Once, Silvio Lui, APRN, Stopped at 04/23/20 0950  [COMPLETED] etoposide (VEPESID) 190 mg in sodium Intravenous, Once, Alee Wasserman APRN, Stopped at 04/21/20 1306  [COMPLETED] 0.9% NaCl infusion, 1,000 mL, Intravenous, Once, JOHANNA Soto, Stopped at 04/21/20 1515  [COMPLETED] etoposide (VEPESID) 190 mg in sodium chloride 0.9% 509.5 mL ch 05/05/2020    BUNCREA 19.6 05/05/2020    CREATSERUM 0.97 05/05/2020    ANIONGAP 5 05/05/2020    GFRNAA 84 05/05/2020    GFRAA 97 05/05/2020    CA 8.3 (L) 05/05/2020    OSMOCALC 291 05/05/2020    ALKPHO 80 05/05/2020    AST 12 (L) 05/05/2020    ALT 18 05/05

## 2020-05-11 ENCOUNTER — OFFICE VISIT (OUTPATIENT)
Dept: HEMATOLOGY/ONCOLOGY | Facility: HOSPITAL | Age: 60
End: 2020-05-11
Attending: INTERNAL MEDICINE
Payer: COMMERCIAL

## 2020-05-11 ENCOUNTER — HOSPITAL ENCOUNTER (OUTPATIENT)
Dept: CT IMAGING | Facility: HOSPITAL | Age: 60
Discharge: HOME | End: 2020-05-11
Attending: NURSE PRACTITIONER
Payer: COMMERCIAL

## 2020-05-11 ENCOUNTER — TELEPHONE (OUTPATIENT)
Dept: HEMATOLOGY/ONCOLOGY | Facility: HOSPITAL | Age: 60
End: 2020-05-11

## 2020-05-11 ENCOUNTER — RESEARCH ENCOUNTER (OUTPATIENT)
Dept: HEMATOLOGY/ONCOLOGY | Facility: HOSPITAL | Age: 60
End: 2020-05-11

## 2020-05-11 VITALS
DIASTOLIC BLOOD PRESSURE: 52 MMHG | RESPIRATION RATE: 16 BRPM | HEIGHT: 60 IN | OXYGEN SATURATION: 98 % | SYSTOLIC BLOOD PRESSURE: 95 MMHG | BODY MASS INDEX: 27.35 KG/M2 | WEIGHT: 139.31 LBS | TEMPERATURE: 98 F | HEART RATE: 72 BPM

## 2020-05-11 DIAGNOSIS — Z51.11 CHEMOTHERAPY MANAGEMENT, ENCOUNTER FOR: ICD-10-CM

## 2020-05-11 DIAGNOSIS — C34.92 SMALL CELL CARCINOMA OF LEFT LUNG (HCC): Primary | ICD-10-CM

## 2020-05-11 DIAGNOSIS — Z45.2 ENCOUNTER FOR CARE RELATED TO VASCULAR ACCESS PORT: ICD-10-CM

## 2020-05-11 DIAGNOSIS — R21 RASH: ICD-10-CM

## 2020-05-11 DIAGNOSIS — Z00.6 CLINICAL TRIAL EXAM: ICD-10-CM

## 2020-05-11 DIAGNOSIS — D70.1 CHEMOTHERAPY INDUCED NEUTROPENIA (HCC): ICD-10-CM

## 2020-05-11 DIAGNOSIS — T45.1X5A CHEMOTHERAPY INDUCED NEUTROPENIA (HCC): ICD-10-CM

## 2020-05-11 DIAGNOSIS — C34.92 SMALL CELL CARCINOMA OF LEFT LUNG (HCC): ICD-10-CM

## 2020-05-11 PROCEDURE — 84443 ASSAY THYROID STIM HORMONE: CPT

## 2020-05-11 PROCEDURE — 80053 COMPREHEN METABOLIC PANEL: CPT

## 2020-05-11 PROCEDURE — 84481 FREE ASSAY (FT-3): CPT

## 2020-05-11 PROCEDURE — 99215 OFFICE O/P EST HI 40 MIN: CPT | Performed by: INTERNAL MEDICINE

## 2020-05-11 PROCEDURE — 85027 COMPLETE CBC AUTOMATED: CPT

## 2020-05-11 PROCEDURE — 71270 CT THORAX DX C-/C+: CPT | Performed by: NURSE PRACTITIONER

## 2020-05-11 PROCEDURE — 36591 DRAW BLOOD OFF VENOUS DEVICE: CPT

## 2020-05-11 PROCEDURE — 85007 BL SMEAR W/DIFF WBC COUNT: CPT

## 2020-05-11 PROCEDURE — 84439 ASSAY OF FREE THYROXINE: CPT

## 2020-05-11 PROCEDURE — 85025 COMPLETE CBC W/AUTO DIFF WBC: CPT

## 2020-05-11 PROCEDURE — 82565 ASSAY OF CREATININE: CPT

## 2020-05-11 RX ORDER — 0.9 % SODIUM CHLORIDE 0.9 %
VIAL (ML) INJECTION
Status: DISCONTINUED
Start: 2020-05-11 | End: 2020-05-11

## 2020-05-11 RX ORDER — 0.9 % SODIUM CHLORIDE 0.9 %
10 VIAL (ML) INJECTION ONCE
Status: CANCELLED | OUTPATIENT
Start: 2020-05-11

## 2020-05-11 RX ORDER — HEPARIN SODIUM (PORCINE) LOCK FLUSH IV SOLN 100 UNIT/ML 100 UNIT/ML
5 SOLUTION INTRAVENOUS ONCE
Status: CANCELLED | OUTPATIENT
Start: 2020-05-11

## 2020-05-11 RX ORDER — HEPARIN SODIUM (PORCINE) LOCK FLUSH IV SOLN 100 UNIT/ML 100 UNIT/ML
5 SOLUTION INTRAVENOUS ONCE
Status: COMPLETED | OUTPATIENT
Start: 2020-05-11 | End: 2020-05-11

## 2020-05-11 RX ADMIN — HEPARIN SODIUM (PORCINE) LOCK FLUSH IV SOLN 100 UNIT/ML 500 UNITS: 100 SOLUTION INTRAVENOUS at 09:28:00

## 2020-05-11 NOTE — PROGRESS NOTES
See and examined with GENESIS [place]. Limited state small cell lung cancer on . Here for atezolizumab on trial.   On exam confortable. nonlabored respirations. No new skin changes. Continue immunotherapy.  RTC 3 weeks rest per genesis

## 2020-05-11 NOTE — PROGRESS NOTES
[facility] Progress Note    Patient Name: Rogerio Pineda. YOB: 1960   Medical Record Number: [id number]   Attending Physician: Vero Langston M.D.      Chief Complaint:  Small cell lung cancer    History of Present Illness:  Cancer h Packs/day: 1.50        Years: 20.00        Pack years: 27        Quit date: 2000        Years since quittin.2      Smokeless tobacco: Never Used    Substance and Sexual Activity      Alcohol use:  Yes        Alcohol/week: 7.0 standard drinks 500 Units at 04/28/20 0844  [COMPLETED] ondansetron HCl (ZOFRAN) 8 mg, Dexamethasone Sodium Phosphate (DECADRON) 6 mg in sodium chloride 0.9% 104.6 mL IVPB, , Intravenous, Once, JOAHNNA Levine, Stopped at 04/23/20 0950  [COMPLETED] etoposide (VEPE Plan Recorded), Intravenous, Once, JOHANNA Block, Stopped at 04/21/20 1306  [COMPLETED] 0.9% NaCl infusion, 1,000 mL, Intravenous, Once, JOHANNA Block, Stopped at 04/21/20 1515  [COMPLETED] etoposide (VEPESID) 190 mg in sodium chloride 05/11/2020    ANIONGAP 4 05/11/2020    GFRNAA 92 05/11/2020    GFRAA 106 05/11/2020    CA 8.6 05/11/2020    OSMOCALC 285 05/11/2020    ALKPHO 79 05/11/2020    AST 9 (L) 05/11/2020    ALT 20 05/11/2020    BILT 0.3 05/11/2020    TP 6.6 05/11/2020    ALB 3.4

## 2020-05-11 NOTE — PROGRESS NOTES
Study: Cindy Bell  Patient CI:--[id number]  Treatment Arm: Arm 2   Timepoint: End CRT/C4D1     Patient here for End CRT/C4D1 on NRG .      Rash greatly improved, still applying Triamcinolone cream QHS.   Intermittent nausea after last cycle of chem

## 2020-05-11 NOTE — TELEPHONE ENCOUNTER
[facility] CT DEPT Jessica Hameed at [phone] is calling for an order clarification for CT Chest, please call ASAP, patient is waiting per [facility].

## 2020-05-12 ENCOUNTER — OFFICE VISIT (OUTPATIENT)
Dept: HEMATOLOGY/ONCOLOGY | Facility: HOSPITAL | Age: 60
End: 2020-05-12
Attending: INTERNAL MEDICINE
Payer: COMMERCIAL

## 2020-05-12 VITALS
HEART RATE: 60 BPM | SYSTOLIC BLOOD PRESSURE: 127 MMHG | RESPIRATION RATE: 16 BRPM | TEMPERATURE: 98 F | OXYGEN SATURATION: 98 % | DIASTOLIC BLOOD PRESSURE: 59 MMHG

## 2020-05-12 DIAGNOSIS — C34.92 SMALL CELL CARCINOMA OF LEFT LUNG (HCC): ICD-10-CM

## 2020-05-12 DIAGNOSIS — Z45.2 ENCOUNTER FOR CARE RELATED TO VASCULAR ACCESS PORT: Primary | ICD-10-CM

## 2020-05-12 PROCEDURE — 96413 CHEMO IV INFUSION 1 HR: CPT

## 2020-05-12 RX ORDER — HEPARIN SODIUM (PORCINE) LOCK FLUSH IV SOLN 100 UNIT/ML 100 UNIT/ML
5 SOLUTION INTRAVENOUS ONCE
Status: COMPLETED | OUTPATIENT
Start: 2020-05-12 | End: 2020-05-12

## 2020-05-12 RX ORDER — 0.9 % SODIUM CHLORIDE 0.9 %
VIAL (ML) INJECTION
Status: DISCONTINUED
Start: 2020-05-12 | End: 2020-05-12

## 2020-05-12 RX ORDER — 0.9 % SODIUM CHLORIDE 0.9 %
10 VIAL (ML) INJECTION ONCE
Status: CANCELLED | OUTPATIENT
Start: 2020-05-12

## 2020-05-12 RX ORDER — HEPARIN SODIUM (PORCINE) LOCK FLUSH IV SOLN 100 UNIT/ML 100 UNIT/ML
5 SOLUTION INTRAVENOUS ONCE
Status: CANCELLED | OUTPATIENT
Start: 2020-05-12

## 2020-05-12 RX ORDER — HEPARIN SODIUM (PORCINE) LOCK FLUSH IV SOLN 100 UNIT/ML 100 UNIT/ML
SOLUTION INTRAVENOUS
Status: COMPLETED
Start: 2020-05-12 | End: 2020-05-12

## 2020-05-12 RX ADMIN — HEPARIN SODIUM (PORCINE) LOCK FLUSH IV SOLN 100 UNIT/ML 500 UNITS: 100 SOLUTION INTRAVENOUS at 11:13:00

## 2020-05-12 NOTE — PROGRESS NOTES
Pt here for C4D1 Tecentriq. Arrives Ambulating independently, accompanied by Self           Patient reports possible pregnancy since last therapy cycle: Not Applicable    Modifications in dose or schedule: No, Patient is on Research.  Per Research protocol

## 2020-06-01 ENCOUNTER — RESEARCH ENCOUNTER (OUTPATIENT)
Dept: HEMATOLOGY/ONCOLOGY | Facility: HOSPITAL | Age: 60
End: 2020-06-01

## 2020-06-01 ENCOUNTER — NURSE ONLY (OUTPATIENT)
Dept: HEMATOLOGY/ONCOLOGY | Facility: HOSPITAL | Age: 60
End: 2020-06-01
Attending: INTERNAL MEDICINE
Payer: COMMERCIAL

## 2020-06-01 VITALS
OXYGEN SATURATION: 99 % | TEMPERATURE: 98 F | RESPIRATION RATE: 16 BRPM | WEIGHT: 131.19 LBS | SYSTOLIC BLOOD PRESSURE: 110 MMHG | HEIGHT: 60 IN | BODY MASS INDEX: 25.76 KG/M2 | HEART RATE: 61 BPM | DIASTOLIC BLOOD PRESSURE: 61 MMHG

## 2020-06-01 DIAGNOSIS — Z51.11 CHEMOTHERAPY MANAGEMENT, ENCOUNTER FOR: ICD-10-CM

## 2020-06-01 DIAGNOSIS — Z00.6 CLINICAL TRIAL EXAM: ICD-10-CM

## 2020-06-01 DIAGNOSIS — C34.90 SMALL CELL LUNG CANCER (HCC): Primary | ICD-10-CM

## 2020-06-01 DIAGNOSIS — R21 RASH: ICD-10-CM

## 2020-06-01 DIAGNOSIS — Z45.2 ENCOUNTER FOR CARE RELATED TO VASCULAR ACCESS PORT: ICD-10-CM

## 2020-06-01 DIAGNOSIS — C34.90 SMALL CELL LUNG CANCER (HCC): ICD-10-CM

## 2020-06-01 DIAGNOSIS — C34.92 SMALL CELL CARCINOMA OF LEFT LUNG (HCC): Primary | ICD-10-CM

## 2020-06-01 DIAGNOSIS — R79.89 LOW TSH LEVEL: ICD-10-CM

## 2020-06-01 PROCEDURE — 36591 DRAW BLOOD OFF VENOUS DEVICE: CPT

## 2020-06-01 PROCEDURE — 84481 FREE ASSAY (FT-3): CPT

## 2020-06-01 PROCEDURE — 80053 COMPREHEN METABOLIC PANEL: CPT

## 2020-06-01 PROCEDURE — 99215 OFFICE O/P EST HI 40 MIN: CPT | Performed by: INTERNAL MEDICINE

## 2020-06-01 PROCEDURE — 84443 ASSAY THYROID STIM HORMONE: CPT

## 2020-06-01 PROCEDURE — 36415 COLL VENOUS BLD VENIPUNCTURE: CPT

## 2020-06-01 PROCEDURE — 96523 IRRIG DRUG DELIVERY DEVICE: CPT

## 2020-06-01 PROCEDURE — 84439 ASSAY OF FREE THYROXINE: CPT

## 2020-06-01 PROCEDURE — 85025 COMPLETE CBC W/AUTO DIFF WBC: CPT

## 2020-06-01 RX ORDER — HEPARIN SODIUM (PORCINE) LOCK FLUSH IV SOLN 100 UNIT/ML 100 UNIT/ML
5 SOLUTION INTRAVENOUS ONCE
Status: COMPLETED | OUTPATIENT
Start: 2020-06-01 | End: 2020-06-01

## 2020-06-01 RX ORDER — 0.9 % SODIUM CHLORIDE 0.9 %
VIAL (ML) INJECTION
Status: DISCONTINUED
Start: 2020-06-01 | End: 2020-06-01

## 2020-06-01 RX ORDER — 0.9 % SODIUM CHLORIDE 0.9 %
10 VIAL (ML) INJECTION ONCE
Status: CANCELLED | OUTPATIENT
Start: 2020-06-01

## 2020-06-01 RX ORDER — HEPARIN SODIUM (PORCINE) LOCK FLUSH IV SOLN 100 UNIT/ML 100 UNIT/ML
5 SOLUTION INTRAVENOUS ONCE
Status: CANCELLED | OUTPATIENT
Start: 2020-06-01

## 2020-06-01 RX ADMIN — HEPARIN SODIUM (PORCINE) LOCK FLUSH IV SOLN 100 UNIT/ML 500 UNITS: 100 SOLUTION INTRAVENOUS at 08:58:00

## 2020-06-01 NOTE — PROGRESS NOTES
[facility] Progress Note    Patient Name: Estefany Alvarado. YOB: 1960   Medical Record Number: [id number]   Attending Physician: Kobe Li M.D.      Chief Complaint:  Small cell lung cancer    History of Present Illness:  Cancer h of children: Not on file      Years of education: Not on file      Highest education level: Not on file    Tobacco Use      Smoking status: Former Smoker        Packs/day: 1.50        Years: 20.00        Pack years: 27        Quit date: 2/4/2000        Mary Arguelles atezolizumab (NRG- Study Drug) 1,200 mg in sodium chloride 0.9% 270 mL chemo infuson, 1,200 mg, Intravenous, Once, Silvio Hu, APRN, Stopped at 05/12/20 1040  [COMPLETED] Heparin Sodium Lock Flush 100 UNIT/ML injection 500 Units, 5 mL, Intracat Radiology:  none    Cancer Staging  No matching staging information was found for the patient. Impression and Plan:  55-year-old male being evaluated by Medical Oncology for limited-stage small cell lung cancer of the left lung (stage IIIB T4N2).

## 2020-06-01 NOTE — PROGRESS NOTES
Study: [facility]   Patient ZO:--[id number]  Treatment Arm: Arm 2   Timepoint: C5D1     Patient here for C5D1 on [facility] .      Rash unchanged, still applying Triamcinolone cream QHS.   Intermittent nausea continues.  No vomiting.  Patient reports arnoldo

## 2020-06-01 NOTE — PROGRESS NOTES
Study: [facility]   Patient DI:--[id number]  Treatment Arm: Arm 2   Timepoint: C5D1     Patient here for C5D1 on [facility] .      Rash unchanged, still applying Triamcinolone cream QHS.   Intermittent nausea continues.  No vomiting.  Patient reports arnoldo

## 2020-06-01 NOTE — PROGRESS NOTES
Seen and examined with GENESIS [place]. Patient on ROBBY 005 for limited stage small cell with chemo radiotherapy plus or minus atezolizumab. He is now on maintenance atezolizumab as per excellent response to treatment.   The patient has a low TSH but adequate T3-

## 2020-06-02 ENCOUNTER — APPOINTMENT (OUTPATIENT)
Dept: HEMATOLOGY/ONCOLOGY | Facility: HOSPITAL | Age: 60
End: 2020-06-02
Attending: INTERNAL MEDICINE
Payer: COMMERCIAL

## 2020-06-03 ENCOUNTER — RESEARCH ENCOUNTER (OUTPATIENT)
Dept: HEMATOLOGY/ONCOLOGY | Facility: HOSPITAL | Age: 60
End: 2020-06-03

## 2020-06-03 ENCOUNTER — APPOINTMENT (OUTPATIENT)
Dept: HEMATOLOGY/ONCOLOGY | Facility: HOSPITAL | Age: 60
End: 2020-06-03
Attending: INTERNAL MEDICINE
Payer: COMMERCIAL

## 2020-06-03 NOTE — PROGRESS NOTES
Study: [address] [facility]  Patient HE:--[id number]  Treatment Arm: Arm 2     Received clarification from study, OK to proceed with JUSTIN Bradford.

## 2020-06-04 ENCOUNTER — RESEARCH ENCOUNTER (OUTPATIENT)
Dept: HEMATOLOGY/ONCOLOGY | Facility: HOSPITAL | Age: 60
End: 2020-06-04

## 2020-06-04 ENCOUNTER — OFFICE VISIT (OUTPATIENT)
Dept: HEMATOLOGY/ONCOLOGY | Facility: HOSPITAL | Age: 60
End: 2020-06-04
Attending: INTERNAL MEDICINE
Payer: COMMERCIAL

## 2020-06-04 VITALS
HEART RATE: 61 BPM | TEMPERATURE: 98 F | DIASTOLIC BLOOD PRESSURE: 63 MMHG | OXYGEN SATURATION: 99 % | RESPIRATION RATE: 16 BRPM | SYSTOLIC BLOOD PRESSURE: 123 MMHG

## 2020-06-04 DIAGNOSIS — Z45.2 ENCOUNTER FOR CARE RELATED TO VASCULAR ACCESS PORT: Primary | ICD-10-CM

## 2020-06-04 DIAGNOSIS — C34.92 SMALL CELL CARCINOMA OF LEFT LUNG (HCC): ICD-10-CM

## 2020-06-04 PROCEDURE — 96413 CHEMO IV INFUSION 1 HR: CPT

## 2020-06-04 RX ORDER — HEPARIN SODIUM (PORCINE) LOCK FLUSH IV SOLN 100 UNIT/ML 100 UNIT/ML
5 SOLUTION INTRAVENOUS ONCE
Status: COMPLETED | OUTPATIENT
Start: 2020-06-04 | End: 2020-06-04

## 2020-06-04 RX ORDER — HEPARIN SODIUM (PORCINE) LOCK FLUSH IV SOLN 100 UNIT/ML 100 UNIT/ML
SOLUTION INTRAVENOUS
Status: COMPLETED
Start: 2020-06-04 | End: 2020-06-04

## 2020-06-04 RX ORDER — 0.9 % SODIUM CHLORIDE 0.9 %
10 VIAL (ML) INJECTION ONCE
Status: DISCONTINUED | OUTPATIENT
Start: 2020-06-04 | End: 2020-06-04

## 2020-06-04 RX ORDER — 0.9 % SODIUM CHLORIDE 0.9 %
10 VIAL (ML) INJECTION ONCE
Status: CANCELLED | OUTPATIENT
Start: 2020-06-04

## 2020-06-04 RX ORDER — HEPARIN SODIUM (PORCINE) LOCK FLUSH IV SOLN 100 UNIT/ML 100 UNIT/ML
5 SOLUTION INTRAVENOUS ONCE
Status: CANCELLED | OUTPATIENT
Start: 2020-06-04

## 2020-06-04 RX ORDER — 0.9 % SODIUM CHLORIDE 0.9 %
VIAL (ML) INJECTION
Status: DISCONTINUED
Start: 2020-06-04 | End: 2020-06-04

## 2020-06-04 RX ADMIN — HEPARIN SODIUM (PORCINE) LOCK FLUSH IV SOLN 100 UNIT/ML 500 UNITS: 100 SOLUTION INTRAVENOUS at 13:13:00

## 2020-06-04 NOTE — PROGRESS NOTES
Pt here for C5D1 [employer] research study. Arrives Ambulating independently, accompanied by Self           Patient reports possible pregnancy since last therapy cycle: Not Applicable    Modifications in dose or schedule: No, Patient is on Research.  Per Re

## 2020-06-04 NOTE — PROGRESS NOTES
STUDY:   ID # --[id number]  Cycle 5 Atezolizumab    On 6/01/20, patient had labs completed and was examined by MD. Denies any new complaints since then. No new meds.  Patient brought in list of his supplements, however, except for 2 meds, he stopp

## 2020-06-05 ENCOUNTER — TELEPHONE (OUTPATIENT)
Dept: HEMATOLOGY/ONCOLOGY | Facility: HOSPITAL | Age: 60
End: 2020-06-05

## 2020-06-05 ENCOUNTER — APPOINTMENT (OUTPATIENT)
Dept: HEMATOLOGY/ONCOLOGY | Facility: HOSPITAL | Age: 60
End: 2020-06-05
Attending: INTERNAL MEDICINE
Payer: COMMERCIAL

## 2020-06-05 NOTE — TELEPHONE ENCOUNTER
Chana Smiley called with some questions regarding making a dental appointment. Anali Jon he is having a cleaning done and would like to know if he needs to inform the dentist of anything or if he has any restrictions he should be aware of. Please advise.

## 2020-06-05 NOTE — TELEPHONE ENCOUNTER
Returned call to [facility]. I let him know that he is okay to have any dental work done, reviewed labs and medications with him. Verbalized understanding.

## 2020-06-16 ENCOUNTER — TELEPHONE (OUTPATIENT)
Dept: HEMATOLOGY/ONCOLOGY | Facility: HOSPITAL | Age: 60
End: 2020-06-16

## 2020-06-16 ENCOUNTER — RESEARCH ENCOUNTER (OUTPATIENT)
Dept: HEMATOLOGY/ONCOLOGY | Facility: HOSPITAL | Age: 60
End: 2020-06-16

## 2020-06-16 NOTE — PROGRESS NOTES
Study: [facility]005  Patient ZU:--[id number]  Treatment Arm: Arm 2     Discussed supplements with study. Probiotic, digestive enzyme and fish oil are allowed, the remainder of the supplements are prohibited as spelled out in the protocol.   Patient Inez Montague

## 2020-06-22 ENCOUNTER — OFFICE VISIT (OUTPATIENT)
Dept: HEMATOLOGY/ONCOLOGY | Facility: HOSPITAL | Age: 60
End: 2020-06-22
Attending: INTERNAL MEDICINE
Payer: COMMERCIAL

## 2020-06-22 ENCOUNTER — RESEARCH ENCOUNTER (OUTPATIENT)
Dept: HEMATOLOGY/ONCOLOGY | Facility: HOSPITAL | Age: 60
End: 2020-06-22

## 2020-06-22 VITALS
HEIGHT: 60 IN | HEART RATE: 59 BPM | DIASTOLIC BLOOD PRESSURE: 77 MMHG | TEMPERATURE: 97 F | OXYGEN SATURATION: 100 % | RESPIRATION RATE: 16 BRPM | BODY MASS INDEX: 26.27 KG/M2 | SYSTOLIC BLOOD PRESSURE: 131 MMHG | WEIGHT: 133.81 LBS

## 2020-06-22 DIAGNOSIS — C34.92 SMALL CELL CARCINOMA OF LEFT LUNG (HCC): Primary | ICD-10-CM

## 2020-06-22 DIAGNOSIS — R79.89 ELEVATED TSH: ICD-10-CM

## 2020-06-22 DIAGNOSIS — D64.81 ANTINEOPLASTIC CHEMOTHERAPY INDUCED ANEMIA: ICD-10-CM

## 2020-06-22 DIAGNOSIS — Z01.89 ENCOUNTER FOR LABORATORY EXAMINATION: ICD-10-CM

## 2020-06-22 DIAGNOSIS — Z45.2 ENCOUNTER FOR CARE RELATED TO VASCULAR ACCESS PORT: ICD-10-CM

## 2020-06-22 DIAGNOSIS — T45.1X5A ANTINEOPLASTIC CHEMOTHERAPY INDUCED ANEMIA: ICD-10-CM

## 2020-06-22 DIAGNOSIS — R79.89 LOW TSH LEVEL: ICD-10-CM

## 2020-06-22 DIAGNOSIS — Z51.11 CHEMOTHERAPY MANAGEMENT, ENCOUNTER FOR: ICD-10-CM

## 2020-06-22 PROCEDURE — 36591 DRAW BLOOD OFF VENOUS DEVICE: CPT

## 2020-06-22 PROCEDURE — 84439 ASSAY OF FREE THYROXINE: CPT

## 2020-06-22 PROCEDURE — 80053 COMPREHEN METABOLIC PANEL: CPT

## 2020-06-22 PROCEDURE — 85025 COMPLETE CBC W/AUTO DIFF WBC: CPT

## 2020-06-22 PROCEDURE — 99215 OFFICE O/P EST HI 40 MIN: CPT | Performed by: INTERNAL MEDICINE

## 2020-06-22 PROCEDURE — 84443 ASSAY THYROID STIM HORMONE: CPT

## 2020-06-22 RX ORDER — 0.9 % SODIUM CHLORIDE 0.9 %
VIAL (ML) INJECTION
Status: DISCONTINUED
Start: 2020-06-22 | End: 2020-06-22

## 2020-06-22 RX ORDER — HEPARIN SODIUM (PORCINE) LOCK FLUSH IV SOLN 100 UNIT/ML 100 UNIT/ML
5 SOLUTION INTRAVENOUS ONCE
Status: COMPLETED | OUTPATIENT
Start: 2020-06-22 | End: 2020-06-22

## 2020-06-22 RX ORDER — 0.9 % SODIUM CHLORIDE 0.9 %
10 VIAL (ML) INJECTION ONCE
Status: CANCELLED | OUTPATIENT
Start: 2020-06-22

## 2020-06-22 RX ORDER — HEPARIN SODIUM (PORCINE) LOCK FLUSH IV SOLN 100 UNIT/ML 100 UNIT/ML
5 SOLUTION INTRAVENOUS ONCE
Status: CANCELLED | OUTPATIENT
Start: 2020-06-22

## 2020-06-22 RX ADMIN — HEPARIN SODIUM (PORCINE) LOCK FLUSH IV SOLN 100 UNIT/ML 500 UNITS: 100 SOLUTION INTRAVENOUS at 08:28:00

## 2020-06-22 NOTE — PROGRESS NOTES
[facility] Progress Note    Patient Name: Wallis Bumpers. YOB: 1960   Medical Record Number: [id number]   Attending Physician: Shay Melendrez M.D.      Chief Complaint:  Small cell lung cancer    History of Present Illness:  Cancer h 2000        Years since quittin.3      Smokeless tobacco: Never Used    Substance and Sexual Activity      Alcohol use:  Yes        Alcohol/week: 7.0 standard drinks        Types: 7 Cans of beer per week        Frequency: 4 or more times a week mL, Intracatheter, Once, Joanne Canales MD, 500 Units at 06/01/20 8266          Allergies:  No Known Allergies     Review of Systems:  All other systems reviewed and negative x12    Vital Signs:  /77 (BP Location: Left arm, Patient Position: Sitting) here at [facility] with a PET CT 12/23/2019. This showed a hypermetabolic left lung lesion as well as mediastinal involvement  –He is being treated on trial with an NRG  with cisplatin etoposide with radiotherapy plus or minus atezolizumab.   He

## 2020-06-22 NOTE — PROGRESS NOTES
Study: [facility]  Patient WF:--[id number]  Treatment Arm: Arm 2   Timepoint: C6D1     Patient here for C6D1 on NRG .      Rash unchanged, still applying Triamcinolone cream QHS.   Intermittent nausea continues.  No vomiting.  Patient reports arnoldo

## 2020-06-23 ENCOUNTER — OFFICE VISIT (OUTPATIENT)
Dept: HEMATOLOGY/ONCOLOGY | Facility: HOSPITAL | Age: 60
End: 2020-06-23
Attending: INTERNAL MEDICINE
Payer: COMMERCIAL

## 2020-06-23 VITALS
OXYGEN SATURATION: 98 % | SYSTOLIC BLOOD PRESSURE: 133 MMHG | HEART RATE: 63 BPM | DIASTOLIC BLOOD PRESSURE: 70 MMHG | RESPIRATION RATE: 18 BRPM | TEMPERATURE: 98 F

## 2020-06-23 DIAGNOSIS — Z45.2 ENCOUNTER FOR CARE RELATED TO VASCULAR ACCESS PORT: ICD-10-CM

## 2020-06-23 DIAGNOSIS — C34.92 SMALL CELL CARCINOMA OF LEFT LUNG (HCC): Primary | ICD-10-CM

## 2020-06-23 PROCEDURE — 96413 CHEMO IV INFUSION 1 HR: CPT

## 2020-06-23 RX ORDER — HEPARIN SODIUM (PORCINE) LOCK FLUSH IV SOLN 100 UNIT/ML 100 UNIT/ML
SOLUTION INTRAVENOUS
Status: COMPLETED
Start: 2020-06-23 | End: 2020-06-23

## 2020-06-23 RX ORDER — 0.9 % SODIUM CHLORIDE 0.9 %
VIAL (ML) INJECTION
Status: DISCONTINUED
Start: 2020-06-23 | End: 2020-06-23

## 2020-06-23 RX ORDER — 0.9 % SODIUM CHLORIDE 0.9 %
10 VIAL (ML) INJECTION ONCE
Status: CANCELLED | OUTPATIENT
Start: 2020-06-23

## 2020-06-23 RX ORDER — HEPARIN SODIUM (PORCINE) LOCK FLUSH IV SOLN 100 UNIT/ML 100 UNIT/ML
5 SOLUTION INTRAVENOUS ONCE
Status: CANCELLED | OUTPATIENT
Start: 2020-06-23

## 2020-06-23 RX ORDER — HEPARIN SODIUM (PORCINE) LOCK FLUSH IV SOLN 100 UNIT/ML 100 UNIT/ML
5 SOLUTION INTRAVENOUS ONCE
Status: COMPLETED | OUTPATIENT
Start: 2020-06-23 | End: 2020-06-23

## 2020-06-23 RX ADMIN — HEPARIN SODIUM (PORCINE) LOCK FLUSH IV SOLN 100 UNIT/ML 500 UNITS: 100 SOLUTION INTRAVENOUS at 10:13:00

## 2020-06-23 NOTE — PROGRESS NOTES
Pt here for C6D1 [employer] research study. Arrives Ambulating independently, accompanied by Self           Patient reports possible pregnancy since last therapy cycle: Not Applicable    Modifications in dose or schedule: No, Patient is on Research.  Per Re

## 2020-07-01 DIAGNOSIS — R79.89 ELEVATED TSH: ICD-10-CM

## 2020-07-01 DIAGNOSIS — C34.92 SMALL CELL CARCINOMA OF LEFT LUNG (HCC): Primary | ICD-10-CM

## 2020-07-02 ENCOUNTER — TELEPHONE (OUTPATIENT)
Dept: HEMATOLOGY/ONCOLOGY | Facility: HOSPITAL | Age: 60
End: 2020-07-02

## 2020-07-02 ENCOUNTER — NURSE ONLY (OUTPATIENT)
Dept: HEMATOLOGY/ONCOLOGY | Facility: HOSPITAL | Age: 60
End: 2020-07-02
Attending: INTERNAL MEDICINE
Payer: COMMERCIAL

## 2020-07-02 DIAGNOSIS — R79.89 ELEVATED TSH: ICD-10-CM

## 2020-07-02 DIAGNOSIS — Z45.2 ENCOUNTER FOR CARE RELATED TO VASCULAR ACCESS PORT: ICD-10-CM

## 2020-07-02 DIAGNOSIS — C34.92 SMALL CELL CARCINOMA OF LEFT LUNG (HCC): Primary | ICD-10-CM

## 2020-07-02 DIAGNOSIS — E03.9 HYPOTHYROIDISM (ACQUIRED): Primary | ICD-10-CM

## 2020-07-02 DIAGNOSIS — Z92.89 HISTORY OF IMMUNOTHERAPY: ICD-10-CM

## 2020-07-02 LAB
T3FREE SERPL-MCNC: 1.23 PG/ML (ref 2.4–4.2)
T4 FREE SERPL-MCNC: 0.4 NG/DL (ref 0.8–1.7)
TSI SER-ACNC: 34.2 MIU/ML (ref 0.36–3.74)

## 2020-07-02 PROCEDURE — 84439 ASSAY OF FREE THYROXINE: CPT

## 2020-07-02 PROCEDURE — 84443 ASSAY THYROID STIM HORMONE: CPT

## 2020-07-02 PROCEDURE — 84481 FREE ASSAY (FT-3): CPT

## 2020-07-02 PROCEDURE — 36591 DRAW BLOOD OFF VENOUS DEVICE: CPT

## 2020-07-02 RX ORDER — 0.9 % SODIUM CHLORIDE 0.9 %
VIAL (ML) INJECTION
Status: DISCONTINUED
Start: 2020-07-02 | End: 2020-07-02

## 2020-07-02 RX ORDER — HEPARIN SODIUM (PORCINE) LOCK FLUSH IV SOLN 100 UNIT/ML 100 UNIT/ML
5 SOLUTION INTRAVENOUS ONCE
Status: COMPLETED | OUTPATIENT
Start: 2020-07-02 | End: 2020-07-02

## 2020-07-02 RX ORDER — 0.9 % SODIUM CHLORIDE 0.9 %
10 VIAL (ML) INJECTION ONCE
Status: CANCELLED | OUTPATIENT
Start: 2020-07-02

## 2020-07-02 RX ORDER — HEPARIN SODIUM (PORCINE) LOCK FLUSH IV SOLN 100 UNIT/ML 100 UNIT/ML
5 SOLUTION INTRAVENOUS ONCE
Status: CANCELLED | OUTPATIENT
Start: 2020-07-02

## 2020-07-02 RX ORDER — 0.9 % SODIUM CHLORIDE 0.9 %
10 VIAL (ML) INJECTION ONCE
Status: DISCONTINUED | OUTPATIENT
Start: 2020-07-02 | End: 2020-07-02

## 2020-07-02 RX ORDER — LEVOTHYROXINE SODIUM 0.03 MG/1
25 TABLET ORAL
Qty: 30 TABLET | Refills: 0 | Status: SHIPPED | OUTPATIENT
Start: 2020-07-02 | End: 2020-07-13

## 2020-07-02 RX ADMIN — HEPARIN SODIUM (PORCINE) LOCK FLUSH IV SOLN 100 UNIT/ML 500 UNITS: 100 SOLUTION INTRAVENOUS at 09:09:00

## 2020-07-02 NOTE — TELEPHONE ENCOUNTER
Called valentin to review higher TSH/depressed free T4. Asymptomatic. Reviewed acquired hypothyroidism and synthroid dosing. Will plan to start 25mcg QAM and check thyroid levels weekly per study. Emre Siegel agreeable to plan. RTC 7/14.

## 2020-07-08 ENCOUNTER — RESEARCH ENCOUNTER (OUTPATIENT)
Dept: HEMATOLOGY/ONCOLOGY | Facility: HOSPITAL | Age: 60
End: 2020-07-08

## 2020-07-08 NOTE — PROGRESS NOTES
[facility]   Patient WA:--[id number]  Treatment Arm: Arm 2   Timepoint: Note to file     Research RN called patient to see if he is able to come in tomorrow or Friday for a lab draw to recheck his TSH.  Per Protocol, weekly TSH monitoring for G1 H

## 2020-07-13 ENCOUNTER — NURSE ONLY (OUTPATIENT)
Dept: HEMATOLOGY/ONCOLOGY | Facility: HOSPITAL | Age: 60
End: 2020-07-13
Attending: INTERNAL MEDICINE
Payer: COMMERCIAL

## 2020-07-13 ENCOUNTER — RESEARCH ENCOUNTER (OUTPATIENT)
Dept: HEMATOLOGY/ONCOLOGY | Facility: HOSPITAL | Age: 60
End: 2020-07-13

## 2020-07-13 VITALS
OXYGEN SATURATION: 99 % | TEMPERATURE: 97 F | SYSTOLIC BLOOD PRESSURE: 112 MMHG | WEIGHT: 138 LBS | HEART RATE: 60 BPM | RESPIRATION RATE: 16 BRPM | BODY MASS INDEX: 27.09 KG/M2 | HEIGHT: 60 IN | DIASTOLIC BLOOD PRESSURE: 65 MMHG

## 2020-07-13 DIAGNOSIS — E03.9 ACQUIRED HYPOTHYROIDISM: ICD-10-CM

## 2020-07-13 DIAGNOSIS — E87.5 HYPERKALEMIA: ICD-10-CM

## 2020-07-13 DIAGNOSIS — Z51.11 CHEMOTHERAPY MANAGEMENT, ENCOUNTER FOR: ICD-10-CM

## 2020-07-13 DIAGNOSIS — C34.92 SMALL CELL CARCINOMA OF LEFT LUNG (HCC): Primary | ICD-10-CM

## 2020-07-13 DIAGNOSIS — Z45.2 ENCOUNTER FOR CARE RELATED TO VASCULAR ACCESS PORT: ICD-10-CM

## 2020-07-13 DIAGNOSIS — R79.89 ELEVATED TSH: ICD-10-CM

## 2020-07-13 DIAGNOSIS — E03.9 HYPOTHYROIDISM (ACQUIRED): ICD-10-CM

## 2020-07-13 DIAGNOSIS — Z00.6 CLINICAL TRIAL EXAM: ICD-10-CM

## 2020-07-13 DIAGNOSIS — Z92.89 HISTORY OF IMMUNOTHERAPY: ICD-10-CM

## 2020-07-13 LAB
ALBUMIN SERPL-MCNC: 3.6 G/DL (ref 3.4–5)
ALBUMIN/GLOB SERPL: 1.1 {RATIO} (ref 1–2)
ALP LIVER SERPL-CCNC: 85 U/L (ref 45–117)
ALT SERPL-CCNC: 28 U/L (ref 16–61)
ANION GAP SERPL CALC-SCNC: 7 MMOL/L (ref 0–18)
AST SERPL-CCNC: 25 U/L (ref 15–37)
BASOPHILS # BLD AUTO: 0.07 X10(3) UL (ref 0–0.2)
BASOPHILS NFR BLD AUTO: 1.2 %
BILIRUB SERPL-MCNC: 0.7 MG/DL (ref 0.1–2)
BUN BLD-MCNC: 16 MG/DL (ref 7–18)
BUN/CREAT SERPL: 13.9 (ref 10–20)
CALCIUM BLD-MCNC: 8.6 MG/DL (ref 8.5–10.1)
CHLORIDE SERPL-SCNC: 101 MMOL/L (ref 98–112)
CO2 SERPL-SCNC: 28 MMOL/L (ref 21–32)
CREAT BLD-MCNC: 1.15 MG/DL (ref 0.7–1.3)
DEPRECATED RDW RBC AUTO: 47.3 FL (ref 35.1–46.3)
EOSINOPHIL # BLD AUTO: 0.21 X10(3) UL (ref 0–0.7)
EOSINOPHIL NFR BLD AUTO: 3.6 %
ERYTHROCYTE [DISTWIDTH] IN BLOOD BY AUTOMATED COUNT: 13.9 % (ref 11–15)
GLOBULIN PLAS-MCNC: 3.4 G/DL (ref 2.8–4.4)
GLUCOSE BLD-MCNC: 181 MG/DL (ref 70–99)
HCT VFR BLD AUTO: 39.2 % (ref 39–53)
HGB BLD-MCNC: 13.4 G/DL (ref 13–17.5)
IMM GRANULOCYTES # BLD AUTO: 0.04 X10(3) UL (ref 0–1)
IMM GRANULOCYTES NFR BLD: 0.7 %
LYMPHOCYTES # BLD AUTO: 1.31 X10(3) UL (ref 1–4)
LYMPHOCYTES NFR BLD AUTO: 22.6 %
M PROTEIN MFR SERPL ELPH: 7 G/DL (ref 6.4–8.2)
MCH RBC QN AUTO: 31.9 PG (ref 26–34)
MCHC RBC AUTO-ENTMCNC: 34.2 G/DL (ref 31–37)
MCV RBC AUTO: 93.3 FL (ref 80–100)
MONOCYTES # BLD AUTO: 0.54 X10(3) UL (ref 0.1–1)
MONOCYTES NFR BLD AUTO: 9.3 %
NEUTROPHILS # BLD AUTO: 3.62 X10 (3) UL (ref 1.5–7.7)
NEUTROPHILS # BLD AUTO: 3.62 X10(3) UL (ref 1.5–7.7)
NEUTROPHILS NFR BLD AUTO: 62.6 %
OSMOLALITY SERPL CALC.SUM OF ELEC: 288 MOSM/KG (ref 275–295)
PLATELET # BLD AUTO: 170 10(3)UL (ref 150–450)
POTASSIUM SERPL-SCNC: 3.8 MMOL/L (ref 3.5–5.1)
RBC # BLD AUTO: 4.2 X10(6)UL (ref 4.3–5.7)
SODIUM SERPL-SCNC: 136 MMOL/L (ref 136–145)
T4 FREE SERPL-MCNC: 0.4 NG/DL (ref 0.8–1.7)
TSI SER-ACNC: 50.3 MIU/ML (ref 0.36–3.74)
WBC # BLD AUTO: 5.8 X10(3) UL (ref 4–11)

## 2020-07-13 PROCEDURE — 85025 COMPLETE CBC W/AUTO DIFF WBC: CPT

## 2020-07-13 PROCEDURE — 84439 ASSAY OF FREE THYROXINE: CPT

## 2020-07-13 PROCEDURE — 84443 ASSAY THYROID STIM HORMONE: CPT

## 2020-07-13 PROCEDURE — 80053 COMPREHEN METABOLIC PANEL: CPT

## 2020-07-13 PROCEDURE — 36591 DRAW BLOOD OFF VENOUS DEVICE: CPT

## 2020-07-13 PROCEDURE — 99215 OFFICE O/P EST HI 40 MIN: CPT | Performed by: INTERNAL MEDICINE

## 2020-07-13 RX ORDER — LEVOTHYROXINE SODIUM 0.05 MG/1
50 TABLET ORAL
Qty: 30 TABLET | Refills: 0 | Status: SHIPPED | OUTPATIENT
Start: 2020-07-13 | End: 2020-08-17

## 2020-07-13 RX ORDER — 0.9 % SODIUM CHLORIDE 0.9 %
VIAL (ML) INJECTION
Status: DISPENSED
Start: 2020-07-13 | End: 2020-07-13

## 2020-07-13 RX ORDER — 0.9 % SODIUM CHLORIDE 0.9 %
10 VIAL (ML) INJECTION ONCE
Status: CANCELLED | OUTPATIENT
Start: 2020-07-13

## 2020-07-13 RX ORDER — HEPARIN SODIUM (PORCINE) LOCK FLUSH IV SOLN 100 UNIT/ML 100 UNIT/ML
5 SOLUTION INTRAVENOUS ONCE
Status: COMPLETED | OUTPATIENT
Start: 2020-07-13 | End: 2020-07-13

## 2020-07-13 RX ORDER — HEPARIN SODIUM (PORCINE) LOCK FLUSH IV SOLN 100 UNIT/ML 100 UNIT/ML
5 SOLUTION INTRAVENOUS ONCE
Status: CANCELLED | OUTPATIENT
Start: 2020-07-13

## 2020-07-13 RX ADMIN — HEPARIN SODIUM (PORCINE) LOCK FLUSH IV SOLN 100 UNIT/ML 500 UNITS: 100 SOLUTION INTRAVENOUS at 08:53:00

## 2020-07-13 NOTE — PROGRESS NOTES
[facility] Progress Note    Patient Name: Gopal Renee. YOB: 1960   Medical Record Number: [id number]   Attending Physician: Jocelynn Medina M.D.      Chief Complaint:  Small cell lung cancer    History of Present Illness:  Cancer h 2000        Years since quittin.4      Smokeless tobacco: Never Used    Substance and Sexual Activity      Alcohol use:  Yes        Alcohol/week: 7.0 standard drinks        Types: 7 Cans of beer per week        Frequency: 4 or more times a week Flush 100 UNIT/ML lock flush, , , ,   Sodium Chloride 0.9 % solution, , , ,   [COMPLETED] Heparin Sodium Lock Flush 100 UNIT/ML injection 500 Units, 5 mL, Intracatheter, Once, Beryl Steen MD, 500 Units at 07/02/20 0909  [COMPLETED] atezolizumab (NRG-LU0 07/13/2020     07/13/2020    CO2 28.0 07/13/2020       Radiology:  none    Cancer Staging  No matching staging information was found for the patient.     Impression and Plan:  57-year-old male being evaluated by Medical Oncology for limited-stage smal

## 2020-07-13 NOTE — PROGRESS NOTES
Study: [facility]   Patient IX:--[id number]  Treatment Arm: Arm 2   Timepoint: C7D1     Patient here for C7D1 on [facility] . Labs drawn per protocol. Glucose elevated but non fasting. Discussed with Dr. Codie Amaro, continue to monitor.   TSH higher, Levothyro

## 2020-07-14 ENCOUNTER — OFFICE VISIT (OUTPATIENT)
Dept: HEMATOLOGY/ONCOLOGY | Facility: HOSPITAL | Age: 60
End: 2020-07-14
Attending: INTERNAL MEDICINE
Payer: COMMERCIAL

## 2020-07-14 ENCOUNTER — RESEARCH ENCOUNTER (OUTPATIENT)
Dept: HEMATOLOGY/ONCOLOGY | Facility: HOSPITAL | Age: 60
End: 2020-07-14

## 2020-07-14 VITALS
DIASTOLIC BLOOD PRESSURE: 70 MMHG | HEART RATE: 54 BPM | RESPIRATION RATE: 16 BRPM | SYSTOLIC BLOOD PRESSURE: 137 MMHG | TEMPERATURE: 98 F | OXYGEN SATURATION: 98 %

## 2020-07-14 DIAGNOSIS — Z45.2 ENCOUNTER FOR CARE RELATED TO VASCULAR ACCESS PORT: Primary | ICD-10-CM

## 2020-07-14 DIAGNOSIS — C34.92 SMALL CELL CARCINOMA OF LEFT LUNG (HCC): ICD-10-CM

## 2020-07-14 DIAGNOSIS — C34.92 SMALL CELL CARCINOMA OF LEFT LUNG (HCC): Primary | ICD-10-CM

## 2020-07-14 PROCEDURE — 96413 CHEMO IV INFUSION 1 HR: CPT

## 2020-07-14 RX ORDER — HEPARIN SODIUM (PORCINE) LOCK FLUSH IV SOLN 100 UNIT/ML 100 UNIT/ML
5 SOLUTION INTRAVENOUS ONCE
Status: CANCELLED | OUTPATIENT
Start: 2020-07-14

## 2020-07-14 RX ORDER — HEPARIN SODIUM (PORCINE) LOCK FLUSH IV SOLN 100 UNIT/ML 100 UNIT/ML
SOLUTION INTRAVENOUS
Status: COMPLETED
Start: 2020-07-14 | End: 2020-07-14

## 2020-07-14 RX ORDER — HEPARIN SODIUM (PORCINE) LOCK FLUSH IV SOLN 100 UNIT/ML 100 UNIT/ML
5 SOLUTION INTRAVENOUS ONCE
Status: COMPLETED | OUTPATIENT
Start: 2020-07-14 | End: 2020-07-14

## 2020-07-14 RX ORDER — 0.9 % SODIUM CHLORIDE 0.9 %
10 VIAL (ML) INJECTION ONCE
Status: DISCONTINUED | OUTPATIENT
Start: 2020-07-14 | End: 2020-07-14

## 2020-07-14 RX ORDER — 0.9 % SODIUM CHLORIDE 0.9 %
VIAL (ML) INJECTION
Status: DISCONTINUED
Start: 2020-07-14 | End: 2020-07-14

## 2020-07-14 RX ORDER — 0.9 % SODIUM CHLORIDE 0.9 %
10 VIAL (ML) INJECTION ONCE
Status: CANCELLED | OUTPATIENT
Start: 2020-07-14

## 2020-07-14 RX ADMIN — HEPARIN SODIUM (PORCINE) LOCK FLUSH IV SOLN 100 UNIT/ML 500 UNITS: 100 SOLUTION INTRAVENOUS at 10:21:00

## 2020-07-14 NOTE — PROGRESS NOTES
Study: NRG   Patient WW:--[id number]  Treatment Arm: Arm 2   Timepoint: C7D1    Met with patient in infusion. Patient reports slight increase in stools since Sunday (7/12/20), reports 2-3 stools per day with loose/watery consistency.   Denies blo

## 2020-07-14 NOTE — PROGRESS NOTES
Pt here for C7D1 [employer].   Arrives Ambulating independently, accompanied by Self           Patient reports possible pregnancy since last therapy cycle: Not Applicable    Modifications in dose or schedule: No     Frequency of blood return and site check th

## 2020-07-15 ENCOUNTER — RESEARCH ENCOUNTER (OUTPATIENT)
Dept: HEMATOLOGY/ONCOLOGY | Facility: HOSPITAL | Age: 60
End: 2020-07-15

## 2020-07-20 ENCOUNTER — NURSE ONLY (OUTPATIENT)
Dept: HEMATOLOGY/ONCOLOGY | Facility: HOSPITAL | Age: 60
End: 2020-07-20
Attending: INTERNAL MEDICINE
Payer: COMMERCIAL

## 2020-07-20 ENCOUNTER — RESEARCH ENCOUNTER (OUTPATIENT)
Dept: HEMATOLOGY/ONCOLOGY | Facility: HOSPITAL | Age: 60
End: 2020-07-20

## 2020-07-20 DIAGNOSIS — Z45.2 ENCOUNTER FOR CARE RELATED TO VASCULAR ACCESS PORT: ICD-10-CM

## 2020-07-20 DIAGNOSIS — Z92.89 HISTORY OF IMMUNOTHERAPY: ICD-10-CM

## 2020-07-20 DIAGNOSIS — E03.9 HYPOTHYROIDISM (ACQUIRED): Primary | ICD-10-CM

## 2020-07-20 LAB
T4 FREE SERPL-MCNC: 0.6 NG/DL (ref 0.8–1.7)
TSI SER-ACNC: 48.9 MIU/ML (ref 0.36–3.74)

## 2020-07-20 PROCEDURE — 36591 DRAW BLOOD OFF VENOUS DEVICE: CPT

## 2020-07-20 PROCEDURE — 84443 ASSAY THYROID STIM HORMONE: CPT

## 2020-07-20 PROCEDURE — 84439 ASSAY OF FREE THYROXINE: CPT

## 2020-07-20 RX ORDER — 0.9 % SODIUM CHLORIDE 0.9 %
10 VIAL (ML) INJECTION ONCE
Status: CANCELLED | OUTPATIENT
Start: 2020-07-20

## 2020-07-20 RX ORDER — 0.9 % SODIUM CHLORIDE 0.9 %
VIAL (ML) INJECTION
Status: DISCONTINUED
Start: 2020-07-20 | End: 2020-07-20

## 2020-07-20 RX ORDER — HEPARIN SODIUM (PORCINE) LOCK FLUSH IV SOLN 100 UNIT/ML 100 UNIT/ML
5 SOLUTION INTRAVENOUS ONCE
Status: COMPLETED | OUTPATIENT
Start: 2020-07-20 | End: 2020-07-20

## 2020-07-20 RX ORDER — HEPARIN SODIUM (PORCINE) LOCK FLUSH IV SOLN 100 UNIT/ML 100 UNIT/ML
5 SOLUTION INTRAVENOUS ONCE
Status: CANCELLED | OUTPATIENT
Start: 2020-07-20

## 2020-07-20 RX ADMIN — HEPARIN SODIUM (PORCINE) LOCK FLUSH IV SOLN 100 UNIT/ML 500 UNITS: 100 SOLUTION INTRAVENOUS at 09:04:00

## 2020-07-20 NOTE — PROGRESS NOTES
Study: 243 [facility]   Patient CL:--[id number]  Treatment Arm: Arm 2     Patient here for weekly monitoring of thyroid levels according to protocol. TSH and T4 results discussed with Dr. Jazmin Willoughby and GENESIS Lilly.   Patient to continue same dose of Levoth

## 2020-07-27 ENCOUNTER — NURSE ONLY (OUTPATIENT)
Dept: HEMATOLOGY/ONCOLOGY | Facility: HOSPITAL | Age: 60
End: 2020-07-27
Attending: INTERNAL MEDICINE
Payer: COMMERCIAL

## 2020-07-27 DIAGNOSIS — E03.9 ACQUIRED HYPOTHYROIDISM: ICD-10-CM

## 2020-07-27 LAB
T4 FREE SERPL-MCNC: 0.6 NG/DL (ref 0.8–1.7)
TSI SER-ACNC: 43.1 MIU/ML (ref 0.36–3.74)

## 2020-07-27 PROCEDURE — 84443 ASSAY THYROID STIM HORMONE: CPT

## 2020-07-27 PROCEDURE — 36415 COLL VENOUS BLD VENIPUNCTURE: CPT

## 2020-07-27 PROCEDURE — 84439 ASSAY OF FREE THYROXINE: CPT

## 2020-07-28 ENCOUNTER — RESEARCH ENCOUNTER (OUTPATIENT)
Dept: HEMATOLOGY/ONCOLOGY | Facility: HOSPITAL | Age: 60
End: 2020-07-28

## 2020-07-28 NOTE — PROGRESS NOTES
Study: 243 [facility]   Patient YP:--[id number]  Treatment Arm: Arm 2      Weekly monitoring of thyroid levels according to protocol. TSH and T4 results discussed with GENESIS Munroe. Patient to continue same dose of Levothyroxine, recheck in 1 week.

## 2020-08-03 ENCOUNTER — RESEARCH ENCOUNTER (OUTPATIENT)
Dept: HEMATOLOGY/ONCOLOGY | Facility: HOSPITAL | Age: 60
End: 2020-08-03

## 2020-08-03 ENCOUNTER — OFFICE VISIT (OUTPATIENT)
Dept: HEMATOLOGY/ONCOLOGY | Facility: HOSPITAL | Age: 60
End: 2020-08-03
Attending: INTERNAL MEDICINE
Payer: COMMERCIAL

## 2020-08-03 VITALS
TEMPERATURE: 98 F | OXYGEN SATURATION: 99 % | RESPIRATION RATE: 16 BRPM | SYSTOLIC BLOOD PRESSURE: 142 MMHG | HEART RATE: 59 BPM | BODY MASS INDEX: 27.29 KG/M2 | WEIGHT: 139 LBS | HEIGHT: 60 IN | DIASTOLIC BLOOD PRESSURE: 75 MMHG

## 2020-08-03 DIAGNOSIS — C34.92 SMALL CELL CARCINOMA OF LEFT LUNG (HCC): Primary | ICD-10-CM

## 2020-08-03 DIAGNOSIS — Z00.6 CLINICAL TRIAL EXAM: ICD-10-CM

## 2020-08-03 DIAGNOSIS — Z45.2 ENCOUNTER FOR CARE RELATED TO VASCULAR ACCESS PORT: ICD-10-CM

## 2020-08-03 DIAGNOSIS — E03.9 HYPOTHYROIDISM (ACQUIRED): ICD-10-CM

## 2020-08-03 DIAGNOSIS — Z51.11 CHEMOTHERAPY MANAGEMENT, ENCOUNTER FOR: ICD-10-CM

## 2020-08-03 LAB
ALBUMIN SERPL-MCNC: 3.6 G/DL (ref 3.4–5)
ALBUMIN/GLOB SERPL: 0.9 {RATIO} (ref 1–2)
ALP LIVER SERPL-CCNC: 90 U/L (ref 45–117)
ALT SERPL-CCNC: 23 U/L (ref 16–61)
ANION GAP SERPL CALC-SCNC: 7 MMOL/L (ref 0–18)
AST SERPL-CCNC: 14 U/L (ref 15–37)
BASOPHILS # BLD AUTO: 0.06 X10(3) UL (ref 0–0.2)
BASOPHILS NFR BLD AUTO: 1 %
BILIRUB SERPL-MCNC: 0.4 MG/DL (ref 0.1–2)
BUN BLD-MCNC: 20 MG/DL (ref 7–18)
BUN/CREAT SERPL: 18.5 (ref 10–20)
CALCIUM BLD-MCNC: 8.9 MG/DL (ref 8.5–10.1)
CHLORIDE SERPL-SCNC: 103 MMOL/L (ref 98–112)
CO2 SERPL-SCNC: 29 MMOL/L (ref 21–32)
CREAT BLD-MCNC: 1.08 MG/DL (ref 0.7–1.3)
DEPRECATED RDW RBC AUTO: 47.8 FL (ref 35.1–46.3)
EOSINOPHIL # BLD AUTO: 0.17 X10(3) UL (ref 0–0.7)
EOSINOPHIL NFR BLD AUTO: 2.9 %
ERYTHROCYTE [DISTWIDTH] IN BLOOD BY AUTOMATED COUNT: 13.9 % (ref 11–15)
GLOBULIN PLAS-MCNC: 3.8 G/DL (ref 2.8–4.4)
GLUCOSE BLD-MCNC: 141 MG/DL (ref 70–99)
HCT VFR BLD AUTO: 42.5 % (ref 39–53)
HGB BLD-MCNC: 14.1 G/DL (ref 13–17.5)
IMM GRANULOCYTES # BLD AUTO: 0.05 X10(3) UL (ref 0–1)
IMM GRANULOCYTES NFR BLD: 0.8 %
LYMPHOCYTES # BLD AUTO: 1.11 X10(3) UL (ref 1–4)
LYMPHOCYTES NFR BLD AUTO: 18.6 %
M PROTEIN MFR SERPL ELPH: 7.4 G/DL (ref 6.4–8.2)
MCH RBC QN AUTO: 31.1 PG (ref 26–34)
MCHC RBC AUTO-ENTMCNC: 33.2 G/DL (ref 31–37)
MCV RBC AUTO: 93.6 FL (ref 80–100)
MONOCYTES # BLD AUTO: 0.54 X10(3) UL (ref 0.1–1)
MONOCYTES NFR BLD AUTO: 9.1 %
NEUTROPHILS # BLD AUTO: 4.03 X10 (3) UL (ref 1.5–7.7)
NEUTROPHILS # BLD AUTO: 4.03 X10(3) UL (ref 1.5–7.7)
NEUTROPHILS NFR BLD AUTO: 67.6 %
OSMOLALITY SERPL CALC.SUM OF ELEC: 293 MOSM/KG (ref 275–295)
PATIENT FASTING Y/N/NP: NO
PLATELET # BLD AUTO: 172 10(3)UL (ref 150–450)
POTASSIUM SERPL-SCNC: 4 MMOL/L (ref 3.5–5.1)
RBC # BLD AUTO: 4.54 X10(6)UL (ref 4.3–5.7)
SODIUM SERPL-SCNC: 139 MMOL/L (ref 136–145)
T4 FREE SERPL-MCNC: 0.7 NG/DL (ref 0.8–1.7)
TSI SER-ACNC: 38 MIU/ML (ref 0.36–3.74)
WBC # BLD AUTO: 6 X10(3) UL (ref 4–11)

## 2020-08-03 PROCEDURE — 84443 ASSAY THYROID STIM HORMONE: CPT

## 2020-08-03 PROCEDURE — 99215 OFFICE O/P EST HI 40 MIN: CPT | Performed by: INTERNAL MEDICINE

## 2020-08-03 PROCEDURE — 36591 DRAW BLOOD OFF VENOUS DEVICE: CPT

## 2020-08-03 PROCEDURE — 85025 COMPLETE CBC W/AUTO DIFF WBC: CPT

## 2020-08-03 PROCEDURE — 84439 ASSAY OF FREE THYROXINE: CPT

## 2020-08-03 PROCEDURE — 80053 COMPREHEN METABOLIC PANEL: CPT

## 2020-08-03 RX ORDER — HEPARIN SODIUM (PORCINE) LOCK FLUSH IV SOLN 100 UNIT/ML 100 UNIT/ML
5 SOLUTION INTRAVENOUS ONCE
Status: CANCELLED | OUTPATIENT
Start: 2020-08-03

## 2020-08-03 RX ORDER — 0.9 % SODIUM CHLORIDE 0.9 %
10 VIAL (ML) INJECTION ONCE
Status: DISCONTINUED | OUTPATIENT
Start: 2020-08-03 | End: 2020-08-03

## 2020-08-03 RX ORDER — HEPARIN SODIUM (PORCINE) LOCK FLUSH IV SOLN 100 UNIT/ML 100 UNIT/ML
5 SOLUTION INTRAVENOUS ONCE
Status: COMPLETED | OUTPATIENT
Start: 2020-08-03 | End: 2020-08-03

## 2020-08-03 RX ORDER — 0.9 % SODIUM CHLORIDE 0.9 %
10 VIAL (ML) INJECTION ONCE
Status: CANCELLED | OUTPATIENT
Start: 2020-08-03

## 2020-08-03 RX ORDER — 0.9 % SODIUM CHLORIDE 0.9 %
VIAL (ML) INJECTION
Status: DISCONTINUED
Start: 2020-08-03 | End: 2020-08-03

## 2020-08-03 RX ADMIN — HEPARIN SODIUM (PORCINE) LOCK FLUSH IV SOLN 100 UNIT/ML 500 UNITS: 100 SOLUTION INTRAVENOUS at 08:30:00

## 2020-08-03 NOTE — PROGRESS NOTES
And examined with GENESIS Washington. Limited stage small cell lung cancer on clinical trial ROBBY 005 now on maintenance atezolizumab. No evidence of progression to date. Acquired hypothyroidism from immunotherapy.   On exam is comfortable nonlabored respirations n

## 2020-08-03 NOTE — PROGRESS NOTES
[facility] Progress Note    Patient Name: Kathryn Boyce. YOB: 1960   Medical Record Number: [id number]   Attending Physician: Michele Quinn M.D.      Chief Complaint:  Small cell lung cancer    History of Present Illness:  Cancer h history on file. Family History:  No family history on file.     Social History:  Social History    Socioeconomic History      Marital status: Unknown      Spouse name: Not on file      Number of children: Not on file      Years of education: Not on file Once, Oneil Jaime MD, 500 Units at 07/14/20 1021  [COMPLETED] atezolizumab (NRG- Study Drug) 1,200 mg in sodium chloride 0.9% 270 mL chemo infuson, 1,200 mg, Intravenous, Once, JOHANNA Fonseca, Stopped at 07/14/20 1009  [COMPLETED] Heparin 08/03/2020    CO2 29.0 08/03/2020       Radiology:  none    Cancer Staging  No matching staging information was found for the patient.     Impression and Plan:  69-year-old male being evaluated by Medical Oncology for limited-stage small cell lung cancer of

## 2020-08-03 NOTE — PROGRESS NOTES
STUDY:  NRG-  ID # --[id number]  C8 D1    Patient seen and examined per APN today. Research RN assessment:   States he overall is feeling well, working. Cough and fatigue resolved.  Denies any complaints of shortness of breath, chest pain or hea

## 2020-08-04 ENCOUNTER — OFFICE VISIT (OUTPATIENT)
Dept: HEMATOLOGY/ONCOLOGY | Facility: HOSPITAL | Age: 60
End: 2020-08-04
Attending: INTERNAL MEDICINE
Payer: COMMERCIAL

## 2020-08-04 ENCOUNTER — RESEARCH ENCOUNTER (OUTPATIENT)
Dept: HEMATOLOGY/ONCOLOGY | Facility: HOSPITAL | Age: 60
End: 2020-08-04

## 2020-08-04 VITALS
DIASTOLIC BLOOD PRESSURE: 88 MMHG | RESPIRATION RATE: 16 BRPM | TEMPERATURE: 98 F | OXYGEN SATURATION: 99 % | HEART RATE: 60 BPM | SYSTOLIC BLOOD PRESSURE: 125 MMHG

## 2020-08-04 DIAGNOSIS — Z45.2 ENCOUNTER FOR CARE RELATED TO VASCULAR ACCESS PORT: ICD-10-CM

## 2020-08-04 DIAGNOSIS — C34.92 SMALL CELL CARCINOMA OF LEFT LUNG (HCC): Primary | ICD-10-CM

## 2020-08-04 PROCEDURE — 96413 CHEMO IV INFUSION 1 HR: CPT

## 2020-08-04 RX ORDER — HEPARIN SODIUM (PORCINE) LOCK FLUSH IV SOLN 100 UNIT/ML 100 UNIT/ML
5 SOLUTION INTRAVENOUS ONCE
Status: CANCELLED | OUTPATIENT
Start: 2020-08-04

## 2020-08-04 RX ORDER — 0.9 % SODIUM CHLORIDE 0.9 %
10 VIAL (ML) INJECTION ONCE
Status: CANCELLED | OUTPATIENT
Start: 2020-08-04

## 2020-08-04 RX ORDER — HEPARIN SODIUM (PORCINE) LOCK FLUSH IV SOLN 100 UNIT/ML 100 UNIT/ML
SOLUTION INTRAVENOUS
Status: COMPLETED
Start: 2020-08-04 | End: 2020-08-04

## 2020-08-04 RX ORDER — HEPARIN SODIUM (PORCINE) LOCK FLUSH IV SOLN 100 UNIT/ML 100 UNIT/ML
5 SOLUTION INTRAVENOUS ONCE
Status: COMPLETED | OUTPATIENT
Start: 2020-08-04 | End: 2020-08-04

## 2020-08-04 RX ORDER — 0.9 % SODIUM CHLORIDE 0.9 %
VIAL (ML) INJECTION
Status: DISCONTINUED
Start: 2020-08-04 | End: 2020-08-04

## 2020-08-04 RX ADMIN — HEPARIN SODIUM (PORCINE) LOCK FLUSH IV SOLN 100 UNIT/ML 500 UNITS: 100 SOLUTION INTRAVENOUS at 10:13:00

## 2020-08-04 NOTE — PROGRESS NOTES
Pt here for C8D1 [employer] research study. Arrives Ambulating independently, accompanied by Self           Patient reports possible pregnancy since last therapy cycle: Not Applicable    Modifications in dose or schedule: No, Patient is on Research.  Per Re

## 2020-08-10 ENCOUNTER — HOSPITAL ENCOUNTER (OUTPATIENT)
Dept: MRI IMAGING | Facility: HOSPITAL | Age: 60
Discharge: HOME | End: 2020-08-10
Attending: INTERNAL MEDICINE
Payer: COMMERCIAL

## 2020-08-10 ENCOUNTER — APPOINTMENT (OUTPATIENT)
Dept: CT IMAGING | Facility: HOSPITAL | Age: 60
End: 2020-08-10
Attending: INTERNAL MEDICINE
Payer: COMMERCIAL

## 2020-08-10 ENCOUNTER — HOSPITAL ENCOUNTER (OUTPATIENT)
Dept: CT IMAGING | Facility: HOSPITAL | Age: 60
Discharge: HOME | End: 2020-08-10
Attending: NURSE PRACTITIONER
Payer: COMMERCIAL

## 2020-08-10 DIAGNOSIS — Z00.6 CLINICAL TRIAL EXAM: ICD-10-CM

## 2020-08-10 DIAGNOSIS — C34.92 SMALL CELL CARCINOMA OF LEFT LUNG (HCC): ICD-10-CM

## 2020-08-10 PROCEDURE — A9575 INJ GADOTERATE MEGLUMI 0.1ML: HCPCS | Performed by: INTERNAL MEDICINE

## 2020-08-10 PROCEDURE — 71260 CT THORAX DX C+: CPT | Performed by: NURSE PRACTITIONER

## 2020-08-10 PROCEDURE — 74160 CT ABDOMEN W/CONTRAST: CPT | Performed by: NURSE PRACTITIONER

## 2020-08-10 PROCEDURE — 70553 MRI BRAIN STEM W/O & W/DYE: CPT | Performed by: INTERNAL MEDICINE

## 2020-08-17 DIAGNOSIS — Z92.89 HISTORY OF IMMUNOTHERAPY: ICD-10-CM

## 2020-08-17 DIAGNOSIS — R79.89 ELEVATED TSH: ICD-10-CM

## 2020-08-17 DIAGNOSIS — E03.9 HYPOTHYROIDISM (ACQUIRED): ICD-10-CM

## 2020-08-17 RX ORDER — LEVOTHYROXINE SODIUM 0.05 MG/1
TABLET ORAL
Qty: 30 TABLET | Refills: 2 | Status: SHIPPED | OUTPATIENT
Start: 2020-08-17 | End: 2020-09-14 | Stop reason: DRUGHIGH

## 2020-08-24 ENCOUNTER — OFFICE VISIT (OUTPATIENT)
Dept: HEMATOLOGY/ONCOLOGY | Facility: HOSPITAL | Age: 60
End: 2020-08-24
Attending: INTERNAL MEDICINE
Payer: COMMERCIAL

## 2020-08-24 ENCOUNTER — RESEARCH ENCOUNTER (OUTPATIENT)
Dept: HEMATOLOGY/ONCOLOGY | Facility: HOSPITAL | Age: 60
End: 2020-08-24

## 2020-08-24 VITALS
OXYGEN SATURATION: 100 % | RESPIRATION RATE: 16 BRPM | HEIGHT: 60 IN | BODY MASS INDEX: 26.92 KG/M2 | HEART RATE: 63 BPM | DIASTOLIC BLOOD PRESSURE: 63 MMHG | SYSTOLIC BLOOD PRESSURE: 125 MMHG | TEMPERATURE: 98 F | WEIGHT: 137.13 LBS

## 2020-08-24 DIAGNOSIS — Z51.11 CHEMOTHERAPY MANAGEMENT, ENCOUNTER FOR: ICD-10-CM

## 2020-08-24 DIAGNOSIS — Z92.89 HISTORY OF IMMUNOTHERAPY: ICD-10-CM

## 2020-08-24 DIAGNOSIS — Z45.2 ENCOUNTER FOR CARE RELATED TO VASCULAR ACCESS PORT: ICD-10-CM

## 2020-08-24 DIAGNOSIS — C34.92 SMALL CELL CARCINOMA OF LEFT LUNG (HCC): ICD-10-CM

## 2020-08-24 DIAGNOSIS — C34.92 SMALL CELL CARCINOMA OF LEFT LUNG (HCC): Primary | ICD-10-CM

## 2020-08-24 DIAGNOSIS — E03.9 HYPOTHYROIDISM (ACQUIRED): Primary | ICD-10-CM

## 2020-08-24 DIAGNOSIS — Z00.6 CLINICAL TRIAL EXAM: ICD-10-CM

## 2020-08-24 LAB
ALBUMIN SERPL-MCNC: 3.7 G/DL (ref 3.4–5)
ALBUMIN/GLOB SERPL: 1 {RATIO} (ref 1–2)
ALP LIVER SERPL-CCNC: 84 U/L (ref 45–117)
ALT SERPL-CCNC: 20 U/L (ref 16–61)
ANION GAP SERPL CALC-SCNC: 8 MMOL/L (ref 0–18)
AST SERPL-CCNC: 16 U/L (ref 15–37)
BASOPHILS # BLD AUTO: 0.07 X10(3) UL (ref 0–0.2)
BASOPHILS NFR BLD AUTO: 1.2 %
BILIRUB SERPL-MCNC: 0.5 MG/DL (ref 0.1–2)
BUN BLD-MCNC: 19 MG/DL (ref 7–18)
BUN/CREAT SERPL: 15.8 (ref 10–20)
CALCIUM BLD-MCNC: 8.9 MG/DL (ref 8.5–10.1)
CHLORIDE SERPL-SCNC: 102 MMOL/L (ref 98–112)
CO2 SERPL-SCNC: 29 MMOL/L (ref 21–32)
CREAT BLD-MCNC: 1.2 MG/DL (ref 0.7–1.3)
DEPRECATED RDW RBC AUTO: 48.3 FL (ref 35.1–46.3)
EOSINOPHIL # BLD AUTO: 0.2 X10(3) UL (ref 0–0.7)
EOSINOPHIL NFR BLD AUTO: 3.3 %
ERYTHROCYTE [DISTWIDTH] IN BLOOD BY AUTOMATED COUNT: 14.1 % (ref 11–15)
GLOBULIN PLAS-MCNC: 3.7 G/DL (ref 2.8–4.4)
GLUCOSE BLD-MCNC: 168 MG/DL (ref 70–99)
HCT VFR BLD AUTO: 41.7 % (ref 39–53)
HGB BLD-MCNC: 14 G/DL (ref 13–17.5)
IMM GRANULOCYTES # BLD AUTO: 0.03 X10(3) UL (ref 0–1)
IMM GRANULOCYTES NFR BLD: 0.5 %
LYMPHOCYTES # BLD AUTO: 0.96 X10(3) UL (ref 1–4)
LYMPHOCYTES NFR BLD AUTO: 15.8 %
M PROTEIN MFR SERPL ELPH: 7.4 G/DL (ref 6.4–8.2)
MCH RBC QN AUTO: 31 PG (ref 26–34)
MCHC RBC AUTO-ENTMCNC: 33.6 G/DL (ref 31–37)
MCV RBC AUTO: 92.3 FL (ref 80–100)
MONOCYTES # BLD AUTO: 0.53 X10(3) UL (ref 0.1–1)
MONOCYTES NFR BLD AUTO: 8.7 %
NEUTROPHILS # BLD AUTO: 4.27 X10 (3) UL (ref 1.5–7.7)
NEUTROPHILS # BLD AUTO: 4.27 X10(3) UL (ref 1.5–7.7)
NEUTROPHILS NFR BLD AUTO: 70.5 %
OSMOLALITY SERPL CALC.SUM OF ELEC: 294 MOSM/KG (ref 275–295)
PLATELET # BLD AUTO: 185 10(3)UL (ref 150–450)
POTASSIUM SERPL-SCNC: 3.8 MMOL/L (ref 3.5–5.1)
RBC # BLD AUTO: 4.52 X10(6)UL (ref 4.3–5.7)
SODIUM SERPL-SCNC: 139 MMOL/L (ref 136–145)
T4 FREE SERPL-MCNC: 0.7 NG/DL (ref 0.8–1.7)
TSI SER-ACNC: 29.9 MIU/ML (ref 0.36–3.74)
WBC # BLD AUTO: 6.1 X10(3) UL (ref 4–11)

## 2020-08-24 PROCEDURE — 99215 OFFICE O/P EST HI 40 MIN: CPT | Performed by: INTERNAL MEDICINE

## 2020-08-24 PROCEDURE — 36591 DRAW BLOOD OFF VENOUS DEVICE: CPT

## 2020-08-24 PROCEDURE — 84443 ASSAY THYROID STIM HORMONE: CPT

## 2020-08-24 PROCEDURE — 85025 COMPLETE CBC W/AUTO DIFF WBC: CPT

## 2020-08-24 PROCEDURE — 80053 COMPREHEN METABOLIC PANEL: CPT

## 2020-08-24 PROCEDURE — 84439 ASSAY OF FREE THYROXINE: CPT

## 2020-08-24 RX ORDER — HEPARIN SODIUM (PORCINE) LOCK FLUSH IV SOLN 100 UNIT/ML 100 UNIT/ML
5 SOLUTION INTRAVENOUS ONCE
Status: COMPLETED | OUTPATIENT
Start: 2020-08-24 | End: 2020-08-24

## 2020-08-24 RX ORDER — 0.9 % SODIUM CHLORIDE 0.9 %
VIAL (ML) INJECTION
Status: DISCONTINUED
Start: 2020-08-24 | End: 2020-08-24

## 2020-08-24 RX ORDER — 0.9 % SODIUM CHLORIDE 0.9 %
10 VIAL (ML) INJECTION ONCE
Status: CANCELLED | OUTPATIENT
Start: 2020-08-24

## 2020-08-24 RX ORDER — LEVOTHYROXINE SODIUM 0.03 MG/1
25 TABLET ORAL
Qty: 30 TABLET | Refills: 0 | Status: SHIPPED | OUTPATIENT
Start: 2020-08-24 | End: 2020-09-14 | Stop reason: DRUGHIGH

## 2020-08-24 RX ORDER — HEPARIN SODIUM (PORCINE) LOCK FLUSH IV SOLN 100 UNIT/ML 100 UNIT/ML
5 SOLUTION INTRAVENOUS ONCE
Status: CANCELLED | OUTPATIENT
Start: 2020-08-24

## 2020-08-24 RX ADMIN — HEPARIN SODIUM (PORCINE) LOCK FLUSH IV SOLN 100 UNIT/ML 500 UNITS: 100 SOLUTION INTRAVENOUS at 09:25:00

## 2020-08-24 NOTE — PATIENT INSTRUCTIONS
Triamcinolone cream--- use twice daily, not just once daily, to the back. Use moisturizing lotion (Eucerin, Cerave)d at least once daily after triamcinolone cream.   Call if rash or itching worsens at all.     Will call for any adjustments on thyroid med

## 2020-08-24 NOTE — PROGRESS NOTES
STUDY:  NRG-  ID # --[id number]  C9 D1     Patient seen and examined by APN and research nurse today. Imaging completed on 8/10/2020 shows continued improvement in mediastinal lymphadenopathy. And no signs or symptoms of worsening disease.    3 mo

## 2020-08-24 NOTE — PROGRESS NOTES
[facility] Progress Note    Patient Name: Kathryn Boyce. YOB: 1960   Medical Record Number: [id number]   Attending Physician: Michele Quinn M.D.      Chief Complaint:  Small cell lung cancer    History of Present Illness:  Cancer h children: Not on file      Years of education: Not on file      Highest education level: Not on file    Tobacco Use      Smoking status: Former Smoker        Packs/day: 1.50        Years: 20.00        Pack years: 27        Quit date: 2/4/2000        Years 08/04/20 1013  [COMPLETED] Heparin Sodium Lock Flush 100 UNIT/ML injection 500 Units, 5 mL, Intracatheter, Once, Nhi Walters MD, 500 Units at 08/03/20 0830          Allergies:  No Known Allergies     Review of Systems:  All other systems reviewed and ne including chest x-ray and CT that showed a left lung mass in the setting of cough, hoarseness. He had further evaluation here at [address] [place] [address] with a PET CT 12/23/2019.   This showed a hypermetabolic left lung lesion as well as mediastinal involvement  –

## 2020-08-25 ENCOUNTER — OFFICE VISIT (OUTPATIENT)
Dept: HEMATOLOGY/ONCOLOGY | Facility: HOSPITAL | Age: 60
End: 2020-08-25
Attending: INTERNAL MEDICINE
Payer: COMMERCIAL

## 2020-08-25 VITALS
RESPIRATION RATE: 16 BRPM | DIASTOLIC BLOOD PRESSURE: 65 MMHG | OXYGEN SATURATION: 99 % | HEART RATE: 54 BPM | SYSTOLIC BLOOD PRESSURE: 129 MMHG | TEMPERATURE: 98 F

## 2020-08-25 DIAGNOSIS — C34.92 SMALL CELL CARCINOMA OF LEFT LUNG (HCC): Primary | ICD-10-CM

## 2020-08-25 DIAGNOSIS — Z45.2 ENCOUNTER FOR CARE RELATED TO VASCULAR ACCESS PORT: ICD-10-CM

## 2020-08-25 PROCEDURE — 96413 CHEMO IV INFUSION 1 HR: CPT

## 2020-08-25 RX ORDER — HEPARIN SODIUM (PORCINE) LOCK FLUSH IV SOLN 100 UNIT/ML 100 UNIT/ML
5 SOLUTION INTRAVENOUS ONCE
Status: COMPLETED | OUTPATIENT
Start: 2020-08-25 | End: 2020-08-25

## 2020-08-25 RX ORDER — 0.9 % SODIUM CHLORIDE 0.9 %
VIAL (ML) INJECTION
Status: DISCONTINUED
Start: 2020-08-25 | End: 2020-08-25

## 2020-08-25 RX ORDER — HEPARIN SODIUM (PORCINE) LOCK FLUSH IV SOLN 100 UNIT/ML 100 UNIT/ML
SOLUTION INTRAVENOUS
Status: COMPLETED
Start: 2020-08-25 | End: 2020-08-25

## 2020-08-25 RX ORDER — HEPARIN SODIUM (PORCINE) LOCK FLUSH IV SOLN 100 UNIT/ML 100 UNIT/ML
5 SOLUTION INTRAVENOUS ONCE
Status: CANCELLED | OUTPATIENT
Start: 2020-08-25

## 2020-08-25 RX ORDER — 0.9 % SODIUM CHLORIDE 0.9 %
10 VIAL (ML) INJECTION ONCE
Status: CANCELLED | OUTPATIENT
Start: 2020-08-25

## 2020-08-25 RX ADMIN — HEPARIN SODIUM (PORCINE) LOCK FLUSH IV SOLN 100 UNIT/ML 500 UNITS: 100 SOLUTION INTRAVENOUS at 09:01:00

## 2020-08-25 NOTE — PROGRESS NOTES
Pt here for C9D1 [employer] research study. Arrives Ambulating independently, accompanied by Self           Patient reports possible pregnancy since last therapy cycle: Not Applicable    Modifications in dose or schedule: No, Patient is on Research.  Per Re

## 2020-09-14 ENCOUNTER — OFFICE VISIT (OUTPATIENT)
Dept: HEMATOLOGY/ONCOLOGY | Facility: HOSPITAL | Age: 60
End: 2020-09-14
Attending: INTERNAL MEDICINE
Payer: COMMERCIAL

## 2020-09-14 ENCOUNTER — RESEARCH ENCOUNTER (OUTPATIENT)
Dept: HEMATOLOGY/ONCOLOGY | Facility: HOSPITAL | Age: 60
End: 2020-09-14

## 2020-09-14 VITALS
HEART RATE: 61 BPM | RESPIRATION RATE: 16 BRPM | BODY MASS INDEX: 26.9 KG/M2 | DIASTOLIC BLOOD PRESSURE: 71 MMHG | WEIGHT: 137 LBS | SYSTOLIC BLOOD PRESSURE: 132 MMHG | TEMPERATURE: 98 F | OXYGEN SATURATION: 99 % | HEIGHT: 60 IN

## 2020-09-14 DIAGNOSIS — Z00.6 CLINICAL TRIAL EXAM: ICD-10-CM

## 2020-09-14 DIAGNOSIS — C34.92 SMALL CELL CARCINOMA OF LEFT LUNG (HCC): Primary | ICD-10-CM

## 2020-09-14 DIAGNOSIS — Z51.11 CHEMOTHERAPY MANAGEMENT, ENCOUNTER FOR: Primary | ICD-10-CM

## 2020-09-14 DIAGNOSIS — Z45.2 ENCOUNTER FOR CARE RELATED TO VASCULAR ACCESS PORT: ICD-10-CM

## 2020-09-14 DIAGNOSIS — C34.92 SMALL CELL CARCINOMA OF LEFT LUNG (HCC): ICD-10-CM

## 2020-09-14 DIAGNOSIS — E03.9 HYPOTHYROIDISM (ACQUIRED): ICD-10-CM

## 2020-09-14 LAB
ALBUMIN SERPL-MCNC: 3.9 G/DL (ref 3.4–5)
ALBUMIN/GLOB SERPL: 1 {RATIO} (ref 1–2)
ALP LIVER SERPL-CCNC: 82 U/L (ref 45–117)
ALT SERPL-CCNC: 20 U/L (ref 16–61)
ANION GAP SERPL CALC-SCNC: 5 MMOL/L (ref 0–18)
AST SERPL-CCNC: 15 U/L (ref 15–37)
BASOPHILS # BLD AUTO: 0.08 X10(3) UL (ref 0–0.2)
BASOPHILS NFR BLD AUTO: 1.1 %
BILIRUB SERPL-MCNC: 0.4 MG/DL (ref 0.1–2)
BUN BLD-MCNC: 20 MG/DL (ref 7–18)
BUN/CREAT SERPL: 17.5 (ref 10–20)
CALCIUM BLD-MCNC: 9.3 MG/DL (ref 8.5–10.1)
CHLORIDE SERPL-SCNC: 100 MMOL/L (ref 98–112)
CO2 SERPL-SCNC: 31 MMOL/L (ref 21–32)
CREAT BLD-MCNC: 1.14 MG/DL (ref 0.7–1.3)
DEPRECATED RDW RBC AUTO: 47.6 FL (ref 35.1–46.3)
EOSINOPHIL # BLD AUTO: 0.27 X10(3) UL (ref 0–0.7)
EOSINOPHIL NFR BLD AUTO: 3.8 %
ERYTHROCYTE [DISTWIDTH] IN BLOOD BY AUTOMATED COUNT: 14 % (ref 11–15)
GLOBULIN PLAS-MCNC: 3.9 G/DL (ref 2.8–4.4)
GLUCOSE BLD-MCNC: 147 MG/DL (ref 70–99)
HCT VFR BLD AUTO: 44.4 % (ref 39–53)
HGB BLD-MCNC: 15 G/DL (ref 13–17.5)
IMM GRANULOCYTES # BLD AUTO: 0.06 X10(3) UL (ref 0–1)
IMM GRANULOCYTES NFR BLD: 0.9 %
LYMPHOCYTES # BLD AUTO: 1.11 X10(3) UL (ref 1–4)
LYMPHOCYTES NFR BLD AUTO: 15.7 %
M PROTEIN MFR SERPL ELPH: 7.8 G/DL (ref 6.4–8.2)
MCH RBC QN AUTO: 31.1 PG (ref 26–34)
MCHC RBC AUTO-ENTMCNC: 33.8 G/DL (ref 31–37)
MCV RBC AUTO: 91.9 FL (ref 80–100)
MONOCYTES # BLD AUTO: 0.75 X10(3) UL (ref 0.1–1)
MONOCYTES NFR BLD AUTO: 10.6 %
NEUTROPHILS # BLD AUTO: 4.78 X10 (3) UL (ref 1.5–7.7)
NEUTROPHILS # BLD AUTO: 4.78 X10(3) UL (ref 1.5–7.7)
NEUTROPHILS NFR BLD AUTO: 67.9 %
OSMOLALITY SERPL CALC.SUM OF ELEC: 287 MOSM/KG (ref 275–295)
PLATELET # BLD AUTO: 191 10(3)UL (ref 150–450)
POTASSIUM SERPL-SCNC: 4.9 MMOL/L (ref 3.5–5.1)
RBC # BLD AUTO: 4.83 X10(6)UL (ref 4.3–5.7)
SODIUM SERPL-SCNC: 136 MMOL/L (ref 136–145)
T4 FREE SERPL-MCNC: 0.9 NG/DL (ref 0.8–1.7)
TSI SER-ACNC: 12.9 MIU/ML (ref 0.36–3.74)
WBC # BLD AUTO: 7.1 X10(3) UL (ref 4–11)

## 2020-09-14 PROCEDURE — 84443 ASSAY THYROID STIM HORMONE: CPT

## 2020-09-14 PROCEDURE — 85025 COMPLETE CBC W/AUTO DIFF WBC: CPT

## 2020-09-14 PROCEDURE — 99215 OFFICE O/P EST HI 40 MIN: CPT | Performed by: INTERNAL MEDICINE

## 2020-09-14 PROCEDURE — 84439 ASSAY OF FREE THYROXINE: CPT

## 2020-09-14 PROCEDURE — 36415 COLL VENOUS BLD VENIPUNCTURE: CPT

## 2020-09-14 PROCEDURE — 80053 COMPREHEN METABOLIC PANEL: CPT

## 2020-09-14 RX ORDER — HEPARIN SODIUM (PORCINE) LOCK FLUSH IV SOLN 100 UNIT/ML 100 UNIT/ML
5 SOLUTION INTRAVENOUS ONCE
Status: DISCONTINUED | OUTPATIENT
Start: 2020-09-14 | End: 2020-09-14

## 2020-09-14 RX ORDER — 0.9 % SODIUM CHLORIDE 0.9 %
10 VIAL (ML) INJECTION ONCE
Status: CANCELLED | OUTPATIENT
Start: 2020-09-14

## 2020-09-14 RX ORDER — HEPARIN SODIUM (PORCINE) LOCK FLUSH IV SOLN 100 UNIT/ML 100 UNIT/ML
5 SOLUTION INTRAVENOUS ONCE
Status: CANCELLED | OUTPATIENT
Start: 2020-09-14

## 2020-09-14 RX ORDER — LEVOTHYROXINE SODIUM 0.07 MG/1
75 TABLET ORAL
Qty: 30 TABLET | Refills: 2 | Status: SHIPPED | OUTPATIENT
Start: 2020-09-14 | End: 2020-12-18

## 2020-09-14 RX ORDER — 0.9 % SODIUM CHLORIDE 0.9 %
VIAL (ML) INJECTION
Status: DISCONTINUED
Start: 2020-09-14 | End: 2020-09-14 | Stop reason: WASHOUT

## 2020-09-14 NOTE — PROGRESS NOTES
[facility] Progress Note    Patient Name: Desean Noel. YOB: 1960   Medical Record Number: [id number]   Attending Physician: Lenora Blakely M.D.      Chief Complaint:  Small cell lung cancer    History of Present Illness:  Cancer h History    Socioeconomic History      Marital status: Unknown      Spouse name: Not on file      Number of children: Not on file      Years of education: Not on file      Highest education level: Not on file    Tobacco Use      Smoking status: Former Smoke chloride 0.9% 270 mL chemo infuson, 1,200 mg, Intravenous, Once, Krishna Banerjee MD, Stopped at 08/25/20 0850  [COMPLETED] Heparin Sodium Lock Flush 100 UNIT/ML injection 500 Units, 5 mL, Intracatheter, Once, Myre, Gloris Meckel, MD, 500 Units at 08/25/20 0901  [ staging information was found for the patient. Impression and Plan:  49-year-old male being evaluated by Medical Oncology for limited-stage small cell lung cancer of the left lung (stage IIIB T4N2).   He originally had outside studies including chest x-r Monitor.     JOHANNA Alvarado Mai

## 2020-09-14 NOTE — PROGRESS NOTES
Study: Cindy Bell  Patient OP:--[id number]  Treatment Arm: Arm 2   Timepoint: C10D1     Patient here for C10D1 on NRG . Labs drawn per protocol. Seen by GENESIS Orellana for exam.  States he feels well today, continues to work.   Continues with r

## 2020-09-15 ENCOUNTER — OFFICE VISIT (OUTPATIENT)
Dept: HEMATOLOGY/ONCOLOGY | Facility: HOSPITAL | Age: 60
End: 2020-09-15
Attending: INTERNAL MEDICINE
Payer: COMMERCIAL

## 2020-09-15 VITALS
HEART RATE: 64 BPM | OXYGEN SATURATION: 99 % | RESPIRATION RATE: 18 BRPM | DIASTOLIC BLOOD PRESSURE: 62 MMHG | TEMPERATURE: 98 F | SYSTOLIC BLOOD PRESSURE: 117 MMHG

## 2020-09-15 DIAGNOSIS — C34.92 SMALL CELL CARCINOMA OF LEFT LUNG (HCC): Primary | ICD-10-CM

## 2020-09-15 DIAGNOSIS — Z45.2 ENCOUNTER FOR CARE RELATED TO VASCULAR ACCESS PORT: ICD-10-CM

## 2020-09-15 PROCEDURE — 96413 CHEMO IV INFUSION 1 HR: CPT

## 2020-09-15 RX ORDER — 0.9 % SODIUM CHLORIDE 0.9 %
VIAL (ML) INJECTION
Status: DISCONTINUED
Start: 2020-09-15 | End: 2020-09-15

## 2020-09-15 RX ORDER — HEPARIN SODIUM (PORCINE) LOCK FLUSH IV SOLN 100 UNIT/ML 100 UNIT/ML
5 SOLUTION INTRAVENOUS ONCE
Status: CANCELLED | OUTPATIENT
Start: 2020-09-15

## 2020-09-15 RX ORDER — HEPARIN SODIUM (PORCINE) LOCK FLUSH IV SOLN 100 UNIT/ML 100 UNIT/ML
5 SOLUTION INTRAVENOUS ONCE
Status: COMPLETED | OUTPATIENT
Start: 2020-09-15 | End: 2020-09-15

## 2020-09-15 RX ORDER — 0.9 % SODIUM CHLORIDE 0.9 %
10 VIAL (ML) INJECTION ONCE
Status: CANCELLED | OUTPATIENT
Start: 2020-09-15

## 2020-09-15 RX ORDER — HEPARIN SODIUM (PORCINE) LOCK FLUSH IV SOLN 100 UNIT/ML 100 UNIT/ML
SOLUTION INTRAVENOUS
Status: COMPLETED
Start: 2020-09-15 | End: 2020-09-15

## 2020-09-15 RX ADMIN — HEPARIN SODIUM (PORCINE) LOCK FLUSH IV SOLN 100 UNIT/ML 500 UNITS: 100 SOLUTION INTRAVENOUS at 09:38:00

## 2020-09-15 NOTE — PROGRESS NOTES
Pt here for C10D1 [employer] research study. Arrives Ambulating independently, accompanied by Self           Patient reports possible pregnancy since last therapy cycle: Not Applicable    Modifications in dose or schedule: No, Patient is on Research.  Per R

## 2020-10-05 ENCOUNTER — OFFICE VISIT (OUTPATIENT)
Dept: HEMATOLOGY/ONCOLOGY | Facility: HOSPITAL | Age: 60
End: 2020-10-05
Attending: INTERNAL MEDICINE
Payer: COMMERCIAL

## 2020-10-05 ENCOUNTER — RESEARCH ENCOUNTER (OUTPATIENT)
Dept: HEMATOLOGY/ONCOLOGY | Facility: HOSPITAL | Age: 60
End: 2020-10-05

## 2020-10-05 VITALS
WEIGHT: 136 LBS | SYSTOLIC BLOOD PRESSURE: 121 MMHG | TEMPERATURE: 98 F | OXYGEN SATURATION: 99 % | HEIGHT: 60 IN | HEART RATE: 63 BPM | RESPIRATION RATE: 18 BRPM | DIASTOLIC BLOOD PRESSURE: 60 MMHG | BODY MASS INDEX: 26.7 KG/M2

## 2020-10-05 DIAGNOSIS — E03.9 ACQUIRED HYPOTHYROIDISM: ICD-10-CM

## 2020-10-05 DIAGNOSIS — Z00.6 CLINICAL TRIAL EXAM: ICD-10-CM

## 2020-10-05 DIAGNOSIS — C34.92 SMALL CELL CARCINOMA OF LEFT LUNG (HCC): ICD-10-CM

## 2020-10-05 DIAGNOSIS — Z51.11 CHEMOTHERAPY MANAGEMENT, ENCOUNTER FOR: Primary | ICD-10-CM

## 2020-10-05 DIAGNOSIS — R21 RASH: ICD-10-CM

## 2020-10-05 DIAGNOSIS — Z45.2 ENCOUNTER FOR CARE RELATED TO VASCULAR ACCESS PORT: Primary | ICD-10-CM

## 2020-10-05 PROCEDURE — 80053 COMPREHEN METABOLIC PANEL: CPT

## 2020-10-05 PROCEDURE — 36415 COLL VENOUS BLD VENIPUNCTURE: CPT

## 2020-10-05 PROCEDURE — 84439 ASSAY OF FREE THYROXINE: CPT

## 2020-10-05 PROCEDURE — 84443 ASSAY THYROID STIM HORMONE: CPT

## 2020-10-05 PROCEDURE — 99215 OFFICE O/P EST HI 40 MIN: CPT | Performed by: INTERNAL MEDICINE

## 2020-10-05 PROCEDURE — 85025 COMPLETE CBC W/AUTO DIFF WBC: CPT

## 2020-10-05 RX ORDER — 0.9 % SODIUM CHLORIDE 0.9 %
10 VIAL (ML) INJECTION ONCE
Status: CANCELLED | OUTPATIENT
Start: 2020-10-05

## 2020-10-05 RX ORDER — HEPARIN SODIUM (PORCINE) LOCK FLUSH IV SOLN 100 UNIT/ML 100 UNIT/ML
5 SOLUTION INTRAVENOUS ONCE
Status: DISCONTINUED | OUTPATIENT
Start: 2020-10-05 | End: 2020-10-05

## 2020-10-05 RX ORDER — 0.9 % SODIUM CHLORIDE 0.9 %
VIAL (ML) INJECTION
Status: DISCONTINUED
Start: 2020-10-05 | End: 2020-10-05 | Stop reason: WASHOUT

## 2020-10-05 RX ORDER — HEPARIN SODIUM (PORCINE) LOCK FLUSH IV SOLN 100 UNIT/ML 100 UNIT/ML
5 SOLUTION INTRAVENOUS ONCE
Status: CANCELLED | OUTPATIENT
Start: 2020-10-05

## 2020-10-05 NOTE — PROGRESS NOTES
[facility] Progress Note    Patient Name: Daniel Chang. YOB: 1960   Medical Record Number: [id number]   Attending Physician: Yola Daniel M.D.      Chief Complaint:  Small cell lung cancer    History of Present Illness:  Cancer h Socioeconomic History      Marital status: Unknown      Spouse name: Not on file      Number of children: Not on file      Years of education: Not on file      Highest education level: Not on file    Tobacco Use      Smoking status: Former Smoker        Pa Location: Left arm, Patient Position: Sitting, Cuff Size: adult)   Pulse 63   Temp 97.7 °F (36.5 °C) (Temporal)   Resp 18   Ht 1.524 m (5')   Wt 61.7 kg (136 lb)   SpO2 99%   BMI 26.56 kg/m²     Physical Examination:  General: Patient is alert and oriented treated on trial with an NRG  with cisplatin etoposide with radiotherapy plus or minus atezolizumab. He completed the chemoradiotherapy portion April 2020  –Returns for clinical trial visit dayfor  cycle 11 now on atezolizumab alone--Ok to proceed.

## 2020-10-06 ENCOUNTER — OFFICE VISIT (OUTPATIENT)
Dept: HEMATOLOGY/ONCOLOGY | Facility: HOSPITAL | Age: 60
End: 2020-10-06
Attending: INTERNAL MEDICINE
Payer: COMMERCIAL

## 2020-10-06 VITALS
DIASTOLIC BLOOD PRESSURE: 65 MMHG | TEMPERATURE: 98 F | OXYGEN SATURATION: 99 % | SYSTOLIC BLOOD PRESSURE: 125 MMHG | RESPIRATION RATE: 18 BRPM | HEART RATE: 60 BPM

## 2020-10-06 DIAGNOSIS — C34.92 SMALL CELL CARCINOMA OF LEFT LUNG (HCC): Primary | ICD-10-CM

## 2020-10-06 DIAGNOSIS — Z45.2 ENCOUNTER FOR CARE RELATED TO VASCULAR ACCESS PORT: ICD-10-CM

## 2020-10-06 PROCEDURE — 96413 CHEMO IV INFUSION 1 HR: CPT

## 2020-10-06 RX ORDER — HEPARIN SODIUM (PORCINE) LOCK FLUSH IV SOLN 100 UNIT/ML 100 UNIT/ML
SOLUTION INTRAVENOUS
Status: COMPLETED
Start: 2020-10-06 | End: 2020-10-06

## 2020-10-06 RX ORDER — 0.9 % SODIUM CHLORIDE 0.9 %
VIAL (ML) INJECTION
Status: DISCONTINUED
Start: 2020-10-06 | End: 2020-10-06

## 2020-10-06 RX ORDER — HEPARIN SODIUM (PORCINE) LOCK FLUSH IV SOLN 100 UNIT/ML 100 UNIT/ML
5 SOLUTION INTRAVENOUS ONCE
Status: CANCELLED | OUTPATIENT
Start: 2020-10-06

## 2020-10-06 RX ORDER — 0.9 % SODIUM CHLORIDE 0.9 %
10 VIAL (ML) INJECTION ONCE
Status: CANCELLED | OUTPATIENT
Start: 2020-10-06

## 2020-10-06 RX ORDER — HEPARIN SODIUM (PORCINE) LOCK FLUSH IV SOLN 100 UNIT/ML 100 UNIT/ML
5 SOLUTION INTRAVENOUS ONCE
Status: COMPLETED | OUTPATIENT
Start: 2020-10-06 | End: 2020-10-06

## 2020-10-06 RX ADMIN — HEPARIN SODIUM (PORCINE) LOCK FLUSH IV SOLN 100 UNIT/ML 500 UNITS: 100 SOLUTION INTRAVENOUS at 10:20:00

## 2020-10-06 NOTE — PROGRESS NOTES
Pt here for C11D1 [employer] research study. Arrives Ambulating independently, accompanied by Self           Patient reports possible pregnancy since last therapy cycle: Not Applicable    Modifications in dose or schedule: No, Patient is on Research.  Per R

## 2020-10-06 NOTE — PROGRESS NOTES
Study: Melony Grissom  Patient QC:--[id number]  Treatment Arm: Arm 2   Timepoint: C11D1    Patient here for C11D1 on NRG-. Labs drawn per protocol.  Seen by GENESIS Hernández for exam.  States he feels well today.   Reports minimal flare of rash last

## 2020-10-26 ENCOUNTER — NURSE ONLY (OUTPATIENT)
Dept: HEMATOLOGY/ONCOLOGY | Facility: HOSPITAL | Age: 60
End: 2020-10-26
Attending: INTERNAL MEDICINE
Payer: COMMERCIAL

## 2020-10-26 ENCOUNTER — RESEARCH ENCOUNTER (OUTPATIENT)
Dept: HEMATOLOGY/ONCOLOGY | Facility: HOSPITAL | Age: 60
End: 2020-10-26

## 2020-10-26 VITALS
WEIGHT: 134 LBS | SYSTOLIC BLOOD PRESSURE: 132 MMHG | TEMPERATURE: 97 F | HEIGHT: 60 IN | BODY MASS INDEX: 26.31 KG/M2 | RESPIRATION RATE: 16 BRPM | OXYGEN SATURATION: 99 % | HEART RATE: 67 BPM | DIASTOLIC BLOOD PRESSURE: 73 MMHG

## 2020-10-26 DIAGNOSIS — E03.9 ACQUIRED HYPOTHYROIDISM: ICD-10-CM

## 2020-10-26 DIAGNOSIS — Z51.11 CHEMOTHERAPY MANAGEMENT, ENCOUNTER FOR: Primary | ICD-10-CM

## 2020-10-26 DIAGNOSIS — L27.0 DRUG-INDUCED SKIN RASH: ICD-10-CM

## 2020-10-26 DIAGNOSIS — C34.92 SMALL CELL CARCINOMA OF LEFT LUNG (HCC): ICD-10-CM

## 2020-10-26 DIAGNOSIS — C34.92 SMALL CELL CARCINOMA OF LEFT LUNG (HCC): Primary | ICD-10-CM

## 2020-10-26 DIAGNOSIS — Z00.6 CLINICAL TRIAL EXAM: ICD-10-CM

## 2020-10-26 PROCEDURE — 36415 COLL VENOUS BLD VENIPUNCTURE: CPT

## 2020-10-26 PROCEDURE — 80053 COMPREHEN METABOLIC PANEL: CPT

## 2020-10-26 PROCEDURE — 84439 ASSAY OF FREE THYROXINE: CPT

## 2020-10-26 PROCEDURE — 99215 OFFICE O/P EST HI 40 MIN: CPT | Performed by: INTERNAL MEDICINE

## 2020-10-26 PROCEDURE — 84443 ASSAY THYROID STIM HORMONE: CPT

## 2020-10-26 PROCEDURE — 85025 COMPLETE CBC W/AUTO DIFF WBC: CPT

## 2020-10-26 NOTE — PROGRESS NOTES
[facility] Progress Note    Patient Name: Rio Whitaker. YOB: 1960   Medical Record Number: [id number]   Attending Physician: Eugene Arana M.D.      Chief Complaint:  Small cell lung cancer    History of Present Illness:  Cancer h family history.     Social History:  Social History    Socioeconomic History      Marital status: Unknown      Spouse name: Not on file      Number of children: Not on file      Years of education: Not on file      Highest education level: Not on file    To Position: Sitting, Cuff Size: adult)   Pulse 67   Temp 97.2 °F (36.2 °C) (Temporal)   Resp 16   Ht 1.524 m (5')   Wt 60.8 kg (134 lb)   SpO2 99%   BMI 26.17 kg/m²     Physical Examination:  General: Patient is alert and oriented x 3, not in acute distress. etoposide with radiotherapy plus or minus atezolizumab. He completed the chemoradiotherapy portion April 2020  –Returns for clinical trial visit dayfor  cycle 12 now on atezolizumab alone--Ok to proceed.   CT chest imaging done on 5/11 per research protoco

## 2020-10-26 NOTE — PROGRESS NOTES
Study: Pily Lamb  Patient TT:--[id number]  Treatment Arm: Arm 2   Timepoint: C12D1     Patient here for C12D1 on NRG . OCWS drawn per protocol.  Seen by GENESIS Benites for exam.  Continues with rash and myalgia.   Reviewed medication, no change

## 2020-10-27 ENCOUNTER — OFFICE VISIT (OUTPATIENT)
Dept: HEMATOLOGY/ONCOLOGY | Facility: HOSPITAL | Age: 60
End: 2020-10-27
Attending: INTERNAL MEDICINE
Payer: COMMERCIAL

## 2020-10-27 VITALS
TEMPERATURE: 98 F | RESPIRATION RATE: 16 BRPM | SYSTOLIC BLOOD PRESSURE: 113 MMHG | HEART RATE: 68 BPM | DIASTOLIC BLOOD PRESSURE: 70 MMHG

## 2020-10-27 DIAGNOSIS — C34.92 SMALL CELL CARCINOMA OF LEFT LUNG (HCC): Primary | ICD-10-CM

## 2020-10-27 DIAGNOSIS — Z45.2 ENCOUNTER FOR CARE RELATED TO VASCULAR ACCESS PORT: ICD-10-CM

## 2020-10-27 PROCEDURE — 96413 CHEMO IV INFUSION 1 HR: CPT

## 2020-10-27 RX ORDER — SODIUM CHLORIDE 9 MG/ML
10 INJECTION INTRAVENOUS ONCE
Status: DISCONTINUED | OUTPATIENT
Start: 2020-10-27 | End: 2020-10-27

## 2020-10-27 RX ORDER — HEPARIN SODIUM (PORCINE) LOCK FLUSH IV SOLN 100 UNIT/ML 100 UNIT/ML
5 SOLUTION INTRAVENOUS ONCE
Status: COMPLETED | OUTPATIENT
Start: 2020-10-27 | End: 2020-10-27

## 2020-10-27 RX ORDER — HEPARIN SODIUM (PORCINE) LOCK FLUSH IV SOLN 100 UNIT/ML 100 UNIT/ML
SOLUTION INTRAVENOUS
Status: COMPLETED
Start: 2020-10-27 | End: 2020-10-27

## 2020-10-27 RX ORDER — HEPARIN SODIUM (PORCINE) LOCK FLUSH IV SOLN 100 UNIT/ML 100 UNIT/ML
5 SOLUTION INTRAVENOUS ONCE
Status: CANCELLED | OUTPATIENT
Start: 2020-10-27

## 2020-10-27 RX ORDER — SODIUM CHLORIDE 9 MG/ML
INJECTION INTRAVENOUS
Status: DISCONTINUED
Start: 2020-10-27 | End: 2020-10-27

## 2020-10-27 RX ORDER — SODIUM CHLORIDE 9 MG/ML
10 INJECTION INTRAVENOUS ONCE
Status: CANCELLED | OUTPATIENT
Start: 2020-10-27

## 2020-10-27 RX ADMIN — HEPARIN SODIUM (PORCINE) LOCK FLUSH IV SOLN 100 UNIT/ML 500 UNITS: 100 SOLUTION INTRAVENOUS at 10:10:00

## 2020-10-27 NOTE — PROGRESS NOTES
Pt here for C12D1 [employer] research study. Arrives Ambulating independently, accompanied by Self           Patient reports possible pregnancy since last therapy cycle: Not Applicable    Modifications in dose or schedule: No, Patient is on Research.  Per R

## 2020-11-11 ENCOUNTER — HOSPITAL ENCOUNTER (OUTPATIENT)
Dept: MRI IMAGING | Facility: HOSPITAL | Age: 60
Discharge: HOME | End: 2020-11-11
Attending: INTERNAL MEDICINE
Payer: COMMERCIAL

## 2020-11-11 ENCOUNTER — APPOINTMENT (OUTPATIENT)
Dept: CT IMAGING | Facility: HOSPITAL | Age: 60
End: 2020-11-11
Attending: INTERNAL MEDICINE
Payer: COMMERCIAL

## 2020-11-11 ENCOUNTER — HOSPITAL ENCOUNTER (OUTPATIENT)
Dept: CT IMAGING | Facility: HOSPITAL | Age: 60
Discharge: HOME | End: 2020-11-11
Attending: INTERNAL MEDICINE
Payer: COMMERCIAL

## 2020-11-11 ENCOUNTER — APPOINTMENT (OUTPATIENT)
Dept: MRI IMAGING | Facility: HOSPITAL | Age: 60
End: 2020-11-11
Attending: INTERNAL MEDICINE
Payer: COMMERCIAL

## 2020-11-11 DIAGNOSIS — C34.92 SMALL CELL CARCINOMA OF LEFT LUNG (HCC): ICD-10-CM

## 2020-11-11 PROCEDURE — 74160 CT ABDOMEN W/CONTRAST: CPT | Performed by: INTERNAL MEDICINE

## 2020-11-11 PROCEDURE — 70553 MRI BRAIN STEM W/O & W/DYE: CPT | Performed by: INTERNAL MEDICINE

## 2020-11-11 PROCEDURE — A9575 INJ GADOTERATE MEGLUMI 0.1ML: HCPCS | Performed by: INTERNAL MEDICINE

## 2020-11-11 PROCEDURE — 71260 CT THORAX DX C+: CPT | Performed by: INTERNAL MEDICINE

## 2020-11-16 ENCOUNTER — RESEARCH ENCOUNTER (OUTPATIENT)
Dept: HEMATOLOGY/ONCOLOGY | Facility: HOSPITAL | Age: 60
End: 2020-11-16

## 2020-11-16 ENCOUNTER — NURSE ONLY (OUTPATIENT)
Dept: HEMATOLOGY/ONCOLOGY | Facility: HOSPITAL | Age: 60
End: 2020-11-16
Attending: INTERNAL MEDICINE
Payer: COMMERCIAL

## 2020-11-16 VITALS
OXYGEN SATURATION: 100 % | BODY MASS INDEX: 26.54 KG/M2 | SYSTOLIC BLOOD PRESSURE: 126 MMHG | HEIGHT: 60 IN | HEART RATE: 64 BPM | WEIGHT: 135.19 LBS | TEMPERATURE: 98 F | RESPIRATION RATE: 16 BRPM | DIASTOLIC BLOOD PRESSURE: 68 MMHG

## 2020-11-16 DIAGNOSIS — E03.9 ACQUIRED HYPOTHYROIDISM: ICD-10-CM

## 2020-11-16 DIAGNOSIS — Z51.11 CHEMOTHERAPY MANAGEMENT, ENCOUNTER FOR: Primary | ICD-10-CM

## 2020-11-16 DIAGNOSIS — Z00.6 CLINICAL TRIAL EXAM: ICD-10-CM

## 2020-11-16 DIAGNOSIS — G93.9 BRAIN LESION: ICD-10-CM

## 2020-11-16 DIAGNOSIS — C34.92 SMALL CELL CARCINOMA OF LEFT LUNG (HCC): ICD-10-CM

## 2020-11-16 DIAGNOSIS — L27.0 DRUG-INDUCED SKIN RASH: ICD-10-CM

## 2020-11-16 DIAGNOSIS — C34.92 SMALL CELL CARCINOMA OF LEFT LUNG (HCC): Primary | ICD-10-CM

## 2020-11-16 PROCEDURE — 85025 COMPLETE CBC W/AUTO DIFF WBC: CPT

## 2020-11-16 PROCEDURE — 84443 ASSAY THYROID STIM HORMONE: CPT

## 2020-11-16 PROCEDURE — 80053 COMPREHEN METABOLIC PANEL: CPT

## 2020-11-16 PROCEDURE — 36415 COLL VENOUS BLD VENIPUNCTURE: CPT

## 2020-11-16 PROCEDURE — 99215 OFFICE O/P EST HI 40 MIN: CPT | Performed by: INTERNAL MEDICINE

## 2020-11-16 RX ORDER — PREDNISONE 10 MG/1
TABLET ORAL
Qty: 51 TABLET | Refills: 0 | Status: SHIPPED | OUTPATIENT
Start: 2020-11-16 | End: 2020-11-30 | Stop reason: ALTCHOICE

## 2020-11-16 NOTE — PATIENT INSTRUCTIONS
HOLD treatment for 2 weeks d/t worse rash from immunotherapy. Will reschedule for 2 weeks from now.     Steroid treatment with Prednisone with taper schedule as follows:  (Sent to your pharmacy)    Take 60mg (6 tabs) Prednisone every AM with food for 5 days

## 2020-11-16 NOTE — PROGRESS NOTES
[facility] Progress Note    Patient Name: Dany Garcia. YOB: 1960   Medical Record Number: [id number]   Attending Physician: Ramin Wetzel M.D.      Chief Complaint:  Small cell lung cancer    History of Present Illness:  Cancer h history on file.     Social History:  Social History    Socioeconomic History      Marital status: Unknown      Spouse name: Not on file      Number of children: Not on file      Years of education: Not on file      Highest education level: Not on file    T Flush 100 UNIT/ML injection 500 Units, 5 mL, Intracatheter, Once, Cheyenne Wilson MD, 500 Units at 10/27/20 1010          Allergies:  No Known Allergies     Review of Systems:  All other systems reviewed and negative x12    Vital Signs:  /68 (BP Locat chest x-ray and CT that showed a left lung mass in the setting of cough, hoarseness. He had further evaluation here at [facility] with a PET CT 12/23/2019.   This showed a hypermetabolic left lung lesion as well as mediastinal involvement  –He is bein relief and call if worsens. Imaging plans as above. –Neutropenia grade 1 on chemotherapy. Resolved. Anemia resolved. –Hyperkalemia resolved with dietary adjustments.       Gee Dewey, APRN

## 2020-11-16 NOTE — PROGRESS NOTES
And examined with GENESIS Washington. Limited stage small cell lung cancer on clinical trial ROBBY 005 now on maintenance atezolizumab. No evidence of progression to date. Acquired hypothyroidism from immunotherapy. Now has worsening dermatitis.   On exam is comfor

## 2020-11-16 NOTE — PROGRESS NOTES
Study: Kathia Dupree  Patient OT:--[id number]  Treatment Arm: Arm 2   Timepoint: C13D1 - Delayed    Patient presents for C13D1 on NRG-. Labs drawn per protocol. Reviewed with Lenore Kawasaki, APN.   EQ-5D-5L, NCI-PRO-CTCAE, FACT-TO1, and Fatigue Short

## 2020-11-17 ENCOUNTER — APPOINTMENT (OUTPATIENT)
Dept: HEMATOLOGY/ONCOLOGY | Facility: HOSPITAL | Age: 60
End: 2020-11-17
Attending: INTERNAL MEDICINE
Payer: COMMERCIAL

## 2020-11-25 ENCOUNTER — TELEPHONE (OUTPATIENT)
Dept: HEMATOLOGY/ONCOLOGY | Facility: HOSPITAL | Age: 60
End: 2020-11-25

## 2020-11-25 NOTE — TELEPHONE ENCOUNTER
Jose Galan called. He would like to request a call back from [place]. He has questions regarding his joints. Please call.

## 2020-11-30 ENCOUNTER — RESEARCH ENCOUNTER (OUTPATIENT)
Dept: HEMATOLOGY/ONCOLOGY | Facility: HOSPITAL | Age: 60
End: 2020-11-30

## 2020-11-30 ENCOUNTER — OFFICE VISIT (OUTPATIENT)
Dept: HEMATOLOGY/ONCOLOGY | Facility: HOSPITAL | Age: 60
End: 2020-11-30
Attending: INTERNAL MEDICINE
Payer: COMMERCIAL

## 2020-11-30 VITALS
OXYGEN SATURATION: 99 % | DIASTOLIC BLOOD PRESSURE: 68 MMHG | TEMPERATURE: 98 F | HEART RATE: 65 BPM | RESPIRATION RATE: 16 BRPM | BODY MASS INDEX: 27.09 KG/M2 | SYSTOLIC BLOOD PRESSURE: 119 MMHG | HEIGHT: 60 IN | WEIGHT: 138 LBS

## 2020-11-30 DIAGNOSIS — E03.9 ACQUIRED HYPOTHYROIDISM: ICD-10-CM

## 2020-11-30 DIAGNOSIS — C34.92 SMALL CELL CARCINOMA OF LEFT LUNG (HCC): Primary | ICD-10-CM

## 2020-11-30 DIAGNOSIS — Z51.11 CHEMOTHERAPY MANAGEMENT, ENCOUNTER FOR: Primary | ICD-10-CM

## 2020-11-30 DIAGNOSIS — C34.92 SMALL CELL CARCINOMA OF LEFT LUNG (HCC): ICD-10-CM

## 2020-11-30 DIAGNOSIS — Z00.6 CLINICAL TRIAL EXAM: ICD-10-CM

## 2020-11-30 DIAGNOSIS — G93.9 BRAIN LESION: ICD-10-CM

## 2020-11-30 PROCEDURE — 80053 COMPREHEN METABOLIC PANEL: CPT

## 2020-11-30 PROCEDURE — 84443 ASSAY THYROID STIM HORMONE: CPT

## 2020-11-30 PROCEDURE — 85025 COMPLETE CBC W/AUTO DIFF WBC: CPT

## 2020-11-30 PROCEDURE — 99215 OFFICE O/P EST HI 40 MIN: CPT | Performed by: INTERNAL MEDICINE

## 2020-11-30 PROCEDURE — 36415 COLL VENOUS BLD VENIPUNCTURE: CPT

## 2020-11-30 NOTE — PROGRESS NOTES
And examined with GENESIS Washington. Limited stage small cell lung cancer on clinical trial ROBBY 005 now on maintenance atezolizumab. No evidence of progression to date. Acquired hypothyroidism from immunotherapy.   Recent dermatitis from immunotherapy now improve

## 2020-11-30 NOTE — PROGRESS NOTES
Study: Lizzette Conti  Patient EQ:--[id number]  Treatment Arm: Arm 2   Timepoint: C13D1      [facility] Labs drawn per protocol. Reviewed with GENESIS Larkin.   WBC slightly elevated but not clinically significant as patie

## 2020-11-30 NOTE — PROGRESS NOTES
[facility] Progress Note    Patient Name: Dawn Cantu. YOB: 1960   Medical Record Number: [id number]   Attending Physician: Dylon Bhatia M.D.      Chief Complaint:  Small cell lung cancer    History of Present Illness:  Cancer h ([facility].) 01/2020   • Shortness of breath        Past Surgical History:  History reviewed. No pertinent surgical history. Family History:  History reviewed. No pertinent family history.     Social History:  Social History    Socioeconomic History      Mariposa (75 mcg total) by mouth before breakfast., Disp: 30 tablet, Rfl: 2    •  Pantoprazole Sodium 40 MG Oral Tab EC, Take 1 tablet (40 mg total) by mouth every morning before breakfast. (Patient not taking: Reported on 11/16/2020 ), Disp: 30 tablet, Rfl: 1 ALB 3.3 (L) 11/30/2020    GLOBULIN 3.6 11/30/2020     11/30/2020    K 4.1 11/30/2020     11/30/2020    CO2 34.0 (H) 11/30/2020       Radiology:  none    Cancer Staging  No matching staging information was found for the patient.     Impression an (no safety issue to monitor Q3-6 weeks). Fatigue grade 1 is resolved currently since treatment start. Monitor.   –Grade 1 myalgias currently resolved; Left lower chest/mediastinal/rib discomfort is minimal and intermittently chronic per patient, may be in r

## 2020-12-01 ENCOUNTER — OFFICE VISIT (OUTPATIENT)
Dept: HEMATOLOGY/ONCOLOGY | Facility: HOSPITAL | Age: 60
End: 2020-12-01
Attending: INTERNAL MEDICINE
Payer: COMMERCIAL

## 2020-12-01 VITALS
TEMPERATURE: 98 F | RESPIRATION RATE: 16 BRPM | HEART RATE: 67 BPM | DIASTOLIC BLOOD PRESSURE: 47 MMHG | SYSTOLIC BLOOD PRESSURE: 99 MMHG | OXYGEN SATURATION: 97 %

## 2020-12-01 DIAGNOSIS — Z45.2 ENCOUNTER FOR CARE RELATED TO VASCULAR ACCESS PORT: ICD-10-CM

## 2020-12-01 DIAGNOSIS — C34.92 SMALL CELL CARCINOMA OF LEFT LUNG (HCC): Primary | ICD-10-CM

## 2020-12-01 PROCEDURE — 96413 CHEMO IV INFUSION 1 HR: CPT

## 2020-12-01 RX ORDER — SODIUM CHLORIDE 9 MG/ML
INJECTION INTRAVENOUS
Status: DISCONTINUED
Start: 2020-12-01 | End: 2020-12-01

## 2020-12-01 RX ORDER — HEPARIN SODIUM (PORCINE) LOCK FLUSH IV SOLN 100 UNIT/ML 100 UNIT/ML
SOLUTION INTRAVENOUS
Status: COMPLETED
Start: 2020-12-01 | End: 2020-12-01

## 2020-12-01 RX ORDER — HEPARIN SODIUM (PORCINE) LOCK FLUSH IV SOLN 100 UNIT/ML 100 UNIT/ML
5 SOLUTION INTRAVENOUS ONCE
Status: COMPLETED | OUTPATIENT
Start: 2020-12-01 | End: 2020-12-01

## 2020-12-01 RX ORDER — 0.9 % SODIUM CHLORIDE 0.9 %
10 VIAL (ML) INJECTION ONCE
Status: CANCELLED | OUTPATIENT
Start: 2020-12-01

## 2020-12-01 RX ORDER — HEPARIN SODIUM (PORCINE) LOCK FLUSH IV SOLN 100 UNIT/ML 100 UNIT/ML
5 SOLUTION INTRAVENOUS ONCE
Status: CANCELLED | OUTPATIENT
Start: 2020-12-01

## 2020-12-01 RX ADMIN — HEPARIN SODIUM (PORCINE) LOCK FLUSH IV SOLN 100 UNIT/ML 500 UNITS: 100 SOLUTION INTRAVENOUS at 09:45:00

## 2020-12-01 NOTE — PROGRESS NOTES
Pt here for C13D1 [employer] research study.   Arrives Ambulating independently, accompanied by Self           Patient reports possible pregnancy since last therapy cycle: Not Applicable    Modifications in dose or schedule: Yes, treatment delayed due to pat

## 2020-12-07 ENCOUNTER — APPOINTMENT (OUTPATIENT)
Dept: HEMATOLOGY/ONCOLOGY | Facility: HOSPITAL | Age: 60
End: 2020-12-07
Attending: INTERNAL MEDICINE
Payer: COMMERCIAL

## 2020-12-17 DIAGNOSIS — E03.9 HYPOTHYROIDISM (ACQUIRED): ICD-10-CM

## 2020-12-18 RX ORDER — LEVOTHYROXINE SODIUM 0.07 MG/1
75 TABLET ORAL
Qty: 90 TABLET | Refills: 1 | Status: SHIPPED | OUTPATIENT
Start: 2020-12-18 | End: 2021-08-27

## 2020-12-21 ENCOUNTER — HOSPITAL ENCOUNTER (OUTPATIENT)
Dept: MRI IMAGING | Facility: HOSPITAL | Age: 60
Discharge: HOME | End: 2020-12-21
Attending: NURSE PRACTITIONER
Payer: COMMERCIAL

## 2020-12-21 ENCOUNTER — APPOINTMENT (OUTPATIENT)
Dept: HEMATOLOGY/ONCOLOGY | Facility: HOSPITAL | Age: 60
End: 2020-12-21
Attending: INTERNAL MEDICINE
Payer: COMMERCIAL

## 2020-12-21 ENCOUNTER — HOSPITAL ENCOUNTER (OUTPATIENT)
Dept: CT IMAGING | Facility: HOSPITAL | Age: 60
Discharge: HOME | End: 2020-12-21
Attending: NURSE PRACTITIONER
Payer: COMMERCIAL

## 2020-12-21 DIAGNOSIS — Z00.6 CLINICAL TRIAL EXAM: ICD-10-CM

## 2020-12-21 DIAGNOSIS — Z51.11 CHEMOTHERAPY MANAGEMENT, ENCOUNTER FOR: ICD-10-CM

## 2020-12-21 DIAGNOSIS — G93.9 BRAIN LESION: ICD-10-CM

## 2020-12-21 DIAGNOSIS — C34.92 SMALL CELL CARCINOMA OF LEFT LUNG (HCC): ICD-10-CM

## 2020-12-21 PROCEDURE — A9575 INJ GADOTERATE MEGLUMI 0.1ML: HCPCS | Performed by: NURSE PRACTITIONER

## 2020-12-21 PROCEDURE — 70553 MRI BRAIN STEM W/O & W/DYE: CPT | Performed by: NURSE PRACTITIONER

## 2020-12-21 PROCEDURE — 71260 CT THORAX DX C+: CPT | Performed by: NURSE PRACTITIONER

## 2020-12-21 PROCEDURE — 74177 CT ABD & PELVIS W/CONTRAST: CPT | Performed by: NURSE PRACTITIONER

## 2020-12-22 ENCOUNTER — NURSE ONLY (OUTPATIENT)
Dept: HEMATOLOGY/ONCOLOGY | Facility: HOSPITAL | Age: 60
End: 2020-12-22
Attending: INTERNAL MEDICINE
Payer: COMMERCIAL

## 2020-12-22 ENCOUNTER — OFFICE VISIT (OUTPATIENT)
Dept: RADIATION ONCOLOGY | Facility: HOSPITAL | Age: 60
End: 2020-12-22
Attending: INTERNAL MEDICINE
Payer: COMMERCIAL

## 2020-12-22 ENCOUNTER — RESEARCH ENCOUNTER (OUTPATIENT)
Dept: HEMATOLOGY/ONCOLOGY | Facility: HOSPITAL | Age: 60
End: 2020-12-22

## 2020-12-22 VITALS
SYSTOLIC BLOOD PRESSURE: 112 MMHG | BODY MASS INDEX: 26.5 KG/M2 | WEIGHT: 135 LBS | DIASTOLIC BLOOD PRESSURE: 69 MMHG | HEART RATE: 70 BPM | TEMPERATURE: 98 F | HEIGHT: 60 IN | RESPIRATION RATE: 16 BRPM | OXYGEN SATURATION: 98 %

## 2020-12-22 DIAGNOSIS — C79.31 BRAIN METASTASIS (HCC): Primary | ICD-10-CM

## 2020-12-22 DIAGNOSIS — E03.9 HYPOTHYROIDISM (ACQUIRED): ICD-10-CM

## 2020-12-22 DIAGNOSIS — C34.92 SMALL CELL CARCINOMA OF LEFT LUNG (HCC): Primary | ICD-10-CM

## 2020-12-22 DIAGNOSIS — Z92.89 HISTORY OF IMMUNOTHERAPY: ICD-10-CM

## 2020-12-22 DIAGNOSIS — Z45.2 ENCOUNTER FOR CARE RELATED TO VASCULAR ACCESS PORT: ICD-10-CM

## 2020-12-22 DIAGNOSIS — C79.31 BRAIN METASTASIS (HCC): ICD-10-CM

## 2020-12-22 PROCEDURE — 80053 COMPREHEN METABOLIC PANEL: CPT

## 2020-12-22 PROCEDURE — 84443 ASSAY THYROID STIM HORMONE: CPT

## 2020-12-22 PROCEDURE — 99215 OFFICE O/P EST HI 40 MIN: CPT | Performed by: INTERNAL MEDICINE

## 2020-12-22 PROCEDURE — 99212 OFFICE O/P EST SF 10 MIN: CPT

## 2020-12-22 PROCEDURE — 85025 COMPLETE CBC W/AUTO DIFF WBC: CPT

## 2020-12-22 PROCEDURE — 36591 DRAW BLOOD OFF VENOUS DEVICE: CPT

## 2020-12-22 RX ORDER — HEPARIN SODIUM (PORCINE) LOCK FLUSH IV SOLN 100 UNIT/ML 100 UNIT/ML
5 SOLUTION INTRAVENOUS ONCE
Status: CANCELLED | OUTPATIENT
Start: 2020-12-22

## 2020-12-22 RX ORDER — DEXAMETHASONE 2 MG/1
2 TABLET ORAL
Qty: 30 TABLET | Refills: 1 | Status: SHIPPED | OUTPATIENT
Start: 2020-12-22 | End: 2021-02-04

## 2020-12-22 RX ORDER — HEPARIN SODIUM (PORCINE) LOCK FLUSH IV SOLN 100 UNIT/ML 100 UNIT/ML
5 SOLUTION INTRAVENOUS ONCE
Status: COMPLETED | OUTPATIENT
Start: 2020-12-22 | End: 2020-12-22

## 2020-12-22 RX ORDER — 0.9 % SODIUM CHLORIDE 0.9 %
10 VIAL (ML) INJECTION ONCE
Status: CANCELLED | OUTPATIENT
Start: 2020-12-22

## 2020-12-22 RX ORDER — SODIUM CHLORIDE 9 MG/ML
INJECTION INTRAVENOUS
Status: DISPENSED
Start: 2020-12-22 | End: 2020-12-22

## 2020-12-22 RX ADMIN — HEPARIN SODIUM (PORCINE) LOCK FLUSH IV SOLN 100 UNIT/ML 500 UNITS: 100 SOLUTION INTRAVENOUS at 12:16:00

## 2020-12-22 NOTE — PROGRESS NOTES
Nursing Consultation Note  Patient: Arvind Oliver.   YOB: 1960  Age: 56year old  Radiation Oncologist: Dr. Ayala Escalera  Referring Physician: [email or web address]  Consult Date: 12/22/2020      Chemotherapy: Yes  Lab [facility] [id number]  Phone: [phone] Fax: [phone]      Past Medical History:   Diagnosis Date   • Brain cancer [facility])    • Cancer ([facility] 75.)     Lung    • Lung cancer ([facility] 75.) 01/2020   • Shortness of breath        No past surgical history on file.     Social His  Service: Not Asked        Blood Transfusions: Not Asked        Caffeine Concern: Yes          1 cup of coffee daily        Occupational Exposure: Not Asked        Hobby Hazards: Not Asked        Sleep Concern: Not Asked        Stress Concern: No patient when speaking and Patient to express feelings  Visual aids:  yes  Hearing disability:  no  Dentures:  no     Knowledge Deficit Plan Of Care:    Problem:  Knowledge Deficit    Problems related to:    Stereotactic Radiosurgery  Radiation therapy  Dis

## 2020-12-22 NOTE — PATIENT INSTRUCTIONS
Dr. Fawn Kincaid will discuss your brain treatment plan with Dr. Byron Levi. We will call you to schedule radiation planning.     Start your steroid today 12/22  -Dexamethasone 4mg daily for 4 days (2 tablets)    Starting Saturday 12/26  -Dexamethasone 2mg daily

## 2020-12-22 NOTE — PROGRESS NOTES
[facility] Progress Note    Patient Name: Quang Richards. YOB: 1960   Medical Record Number: [id number]   Attending Physician: Fernando Cabrera M.D.      Chief Complaint:  Small cell lung cancer    History of Present Illness:  Cancer h pertinent family history.     Social History:  Social History    Socioeconomic History      Marital status: Unknown      Spouse name: Not on file      Number of children: Not on file      Years of education: Not on file      Highest education level: Not on [COMPLETED] Gadoterate Meglumine (DOTAREM) 7.5 MMOL/15ML injection 15 mL, 15 mL, Intravenous, ONCE PRN, Guillermo Gilliland, APRN, 12 mL at 12/21/20 1348    •  [COMPLETED] atezolizumab (NRG- Study Drug) 1,200 mg in sodium chloride 0.9% 270 mL chemo inf  11/30/2020    K 4.1 11/30/2020     11/30/2020    CO2 34.0 (H) 11/30/2020       Radiology:  1.3 cm cerebellar lesion likely metastatic on brain MRI    Cancer Staging  No matching staging information was found for the patient.     Impression a

## 2020-12-22 NOTE — PROGRESS NOTES
Study: NRG   Patient LT:--[id number]  Treatment Arm: Arm 2   Timepoint: C14D1 - cancelled     Patient presents for C14D1 on Alisa Yepez drawn per protocol.  Reviewed with Dr. Sander Collins not clinically significant.       Reviewed ongoing

## 2020-12-24 NOTE — PROGRESS NOTES
[facility]  Radiation Oncology Follow-up    Patient Name: Radha Hernandez.    MRN: [id number]  Referring Physician: Elie Hernandez MD    Diagnosis: limited stage SCLC of the left lung, cT4 N2 M0     Interval History:  The patient is a 56 year APPLY TO AFFECTED AREA     • Pantoprazole Sodium 40 MG Oral Tab EC Take 1 tablet (40 mg total) by mouth every morning before breakfast. 30 tablet 1       Physical Exam  /69, P 70, SF POX wnl    ECO    Gen: NAD  HEENT: NCAT  Neck: no LAD  CV: RRR recommended he start some low-dose dexamethasone. He will take 4 mg daily for 4 days, followed by 2 mg daily until radiation. He has pantoprazole at home and will take this with dexamethasone.     -Applying for [employer] approval.  -Return to clinic f

## 2020-12-28 ENCOUNTER — TELEPHONE (OUTPATIENT)
Dept: HEMATOLOGY/ONCOLOGY | Facility: HOSPITAL | Age: 60
End: 2020-12-28

## 2021-01-06 ENCOUNTER — TELEPHONE (OUTPATIENT)
Dept: RADIATION ONCOLOGY | Facility: HOSPITAL | Age: 61
End: 2021-01-06

## 2021-01-06 NOTE — TELEPHONE ENCOUNTER
Spoke to patient made him aware Dr. Ayala Arevalo has sent an appeal to his insurance for brain RT. I told him we will call him once we get an answer.

## 2021-01-11 ENCOUNTER — APPOINTMENT (OUTPATIENT)
Dept: HEMATOLOGY/ONCOLOGY | Facility: HOSPITAL | Age: 61
End: 2021-01-11
Attending: INTERNAL MEDICINE
Payer: COMMERCIAL

## 2021-01-11 ENCOUNTER — TELEPHONE (OUTPATIENT)
Dept: RADIATION ONCOLOGY | Facility: HOSPITAL | Age: 61
End: 2021-01-11

## 2021-01-11 NOTE — TELEPHONE ENCOUNTER
Donna Musa called. He would like to request a call back from Dr. Alberto Kaplan. He states he has a few questions regarding his medications and upcoming appointments. Please call.

## 2021-01-11 NOTE — TELEPHONE ENCOUNTER
Patient want a call back regarding denial of radiation, very insistance that he talk to [address] [place] [address] Radiation staff or Dr. Waleska Brown.

## 2021-01-12 ENCOUNTER — APPOINTMENT (OUTPATIENT)
Dept: HEMATOLOGY/ONCOLOGY | Facility: HOSPITAL | Age: 61
End: 2021-01-12
Attending: INTERNAL MEDICINE
Payer: COMMERCIAL

## 2021-01-12 ENCOUNTER — TELEPHONE (OUTPATIENT)
Dept: HEMATOLOGY/ONCOLOGY | Facility: HOSPITAL | Age: 61
End: 2021-01-12

## 2021-01-12 NOTE — TELEPHONE ENCOUNTER
Trevin Marquez in regards to getting set up for chemo education for this Friday after his RT sim. Scheduled for 130pm with Naheed DANIELLE.

## 2021-01-15 ENCOUNTER — APPOINTMENT (OUTPATIENT)
Dept: RADIATION ONCOLOGY | Facility: HOSPITAL | Age: 61
End: 2021-01-15
Attending: INTERNAL MEDICINE
Payer: COMMERCIAL

## 2021-01-15 ENCOUNTER — OFFICE VISIT (OUTPATIENT)
Dept: HEMATOLOGY/ONCOLOGY | Facility: HOSPITAL | Age: 61
End: 2021-01-15
Attending: PHYSICIAN ASSISTANT
Payer: COMMERCIAL

## 2021-01-15 DIAGNOSIS — C34.92 SMALL CELL CARCINOMA OF LEFT LUNG (HCC): Primary | ICD-10-CM

## 2021-01-15 PROCEDURE — 99215 OFFICE O/P EST HI 40 MIN: CPT | Performed by: PHYSICIAN ASSISTANT

## 2021-01-15 PROCEDURE — 77399 UNLISTED PX MED RADJ PHYSICS: CPT | Performed by: INTERNAL MEDICINE

## 2021-01-15 PROCEDURE — 77334 RADIATION TREATMENT AID(S): CPT | Performed by: INTERNAL MEDICINE

## 2021-01-15 NOTE — PROGRESS NOTES
Medication Education Record: IV Therapy    Learner:  Patient    Barriers / Limitations:  None    Psychosocial Assessment:  patient psychosocial response appropriate    Diagnosis:   Extensive stage small cell lung cancer    IV Cancer Treatment Name(s): Etop minimize this from occurring.     Recommended Anti-nausea medications (as directed by your provider):  Prochloperazine (Compazine) 10 mg every 6 hours and Ondansetron (Zofran) 8 mg every 8 hours  Take as needed    Helpful hints during cancer treatment:    D persistent diarrhea or constipation, please contact the triage nurse for further instructions. Skin Care  o Avoid direct sunlight.  o Wear a broad-spectrum sunscreen with an SPF of 30 or higher on any skin exposed to the sun.   Re-apply every 2 hours if in about chemotherapy for yourself or family members: http://www.rubi.info/. html        Safety and Handling of Chemotherapy  While you or your family member is receiving chemotherapy, whether in the clinic or at home, the following prec throughout treatment to prevent pregnancy while on these medications and for several months or years after therapy. Chemotherapy can have harmful side effects to the fetus, especially in the first trimester.   In addition, menstrual cycles can become irreg

## 2021-01-21 RX ORDER — DEXAMETHASONE 1 MG
TABLET ORAL
Qty: 20 TABLET | Refills: 1 | Status: SHIPPED | OUTPATIENT
Start: 2021-01-21 | End: 2021-02-04

## 2021-01-21 RX ORDER — DEXAMETHASONE 2 MG/1
2 TABLET ORAL
Qty: 30 TABLET | Refills: 1 | OUTPATIENT
Start: 2021-01-21

## 2021-01-21 NOTE — TELEPHONE ENCOUNTER
Patient needs dexamethasone. He would like the one MG instead of the 2 MG. Patient asked what is going on with his treatment. He has a couple of questions regarding your treatment. Thank you.  Destinee

## 2021-01-25 ENCOUNTER — TELEPHONE (OUTPATIENT)
Dept: HEMATOLOGY/ONCOLOGY | Facility: HOSPITAL | Age: 61
End: 2021-01-25

## 2021-01-25 PROCEDURE — 77338 DESIGN MLC DEVICE FOR IMRT: CPT | Performed by: INTERNAL MEDICINE

## 2021-01-25 PROCEDURE — 77301 RADIOTHERAPY DOSE PLAN IMRT: CPT | Performed by: INTERNAL MEDICINE

## 2021-01-25 PROCEDURE — 77300 RADIATION THERAPY DOSE PLAN: CPT | Performed by: INTERNAL MEDICINE

## 2021-01-25 NOTE — TELEPHONE ENCOUNTER
Patient notified that immune-mediated thyroid dysfunction usually continues after immunotherapy is complete. Right now, his TSH is therapeutic so will continue on current levothyroxine dose.   Patient did not know he would need to be on levothyroxine lifel

## 2021-01-27 ENCOUNTER — APPOINTMENT (OUTPATIENT)
Dept: RADIATION ONCOLOGY | Facility: HOSPITAL | Age: 61
End: 2021-01-27
Attending: INTERNAL MEDICINE
Payer: COMMERCIAL

## 2021-01-27 PROCEDURE — 77386 HC IMRT COMPLEX: CPT | Performed by: INTERNAL MEDICINE

## 2021-01-28 ENCOUNTER — OFFICE VISIT (OUTPATIENT)
Dept: RADIATION ONCOLOGY | Facility: HOSPITAL | Age: 61
End: 2021-01-28
Attending: INTERNAL MEDICINE
Payer: COMMERCIAL

## 2021-01-28 VITALS
RESPIRATION RATE: 16 BRPM | TEMPERATURE: 97 F | DIASTOLIC BLOOD PRESSURE: 63 MMHG | SYSTOLIC BLOOD PRESSURE: 135 MMHG | HEART RATE: 70 BPM

## 2021-01-28 DIAGNOSIS — C79.31 BRAIN METASTASIS (HCC): Primary | ICD-10-CM

## 2021-01-28 PROCEDURE — 77386 HC IMRT COMPLEX: CPT | Performed by: INTERNAL MEDICINE

## 2021-01-28 NOTE — PROGRESS NOTES
[facility] Radiation Treatment Management Note 1-5    Patient:  Dany Garcia.   Age:  56year old  Visit Diagnosis:  SCLC with brain met  Primary Rad/Onc:  Dr. Rick Gómez    Site Delivered Dose (cGy) Prescribed Dose (cGy

## 2021-01-29 PROCEDURE — 77386 HC IMRT COMPLEX: CPT | Performed by: INTERNAL MEDICINE

## 2021-01-29 RX ORDER — TRIAMCINOLONE ACETONIDE 1 MG/G
1 CREAM TOPICAL 2 TIMES DAILY
Qty: 45 G | Refills: 1 | Status: CANCELLED | OUTPATIENT
Start: 2021-01-29

## 2021-02-01 ENCOUNTER — APPOINTMENT (OUTPATIENT)
Dept: RADIATION ONCOLOGY | Facility: HOSPITAL | Age: 61
End: 2021-02-01
Attending: INTERNAL MEDICINE
Payer: COMMERCIAL

## 2021-02-01 ENCOUNTER — TELEPHONE (OUTPATIENT)
Dept: HEMATOLOGY/ONCOLOGY | Facility: HOSPITAL | Age: 61
End: 2021-02-01

## 2021-02-01 PROCEDURE — 77386 HC IMRT COMPLEX: CPT | Performed by: INTERNAL MEDICINE

## 2021-02-01 NOTE — TELEPHONE ENCOUNTER
Spoke to Андрей Downing and called [facility].  I updated him that his triamcinolone cream was sent to [facility] where he requested, but they have not filled it yet. Pharmacist states that patient usually gets the 450g jar, not 45g tubes.   I will ask Trudy DANIELLE to send n

## 2021-02-01 NOTE — TELEPHONE ENCOUNTER
Janel Marc came in today for radiation and was looking to speak with somebody from Dr. Prashant Morales office or Dr. Charles Davis office about a medication that was sent to the wrong location.  He asked that he get a call back to go over this/

## 2021-02-02 ENCOUNTER — TELEPHONE (OUTPATIENT)
Dept: HEMATOLOGY/ONCOLOGY | Facility: HOSPITAL | Age: 61
End: 2021-02-02

## 2021-02-02 PROCEDURE — 77386 HC IMRT COMPLEX: CPT | Performed by: INTERNAL MEDICINE

## 2021-02-03 PROCEDURE — 77386 HC IMRT COMPLEX: CPT | Performed by: INTERNAL MEDICINE

## 2021-02-04 ENCOUNTER — OFFICE VISIT (OUTPATIENT)
Dept: RADIATION ONCOLOGY | Facility: HOSPITAL | Age: 61
End: 2021-02-04
Attending: INTERNAL MEDICINE
Payer: COMMERCIAL

## 2021-02-04 VITALS
SYSTOLIC BLOOD PRESSURE: 126 MMHG | OXYGEN SATURATION: 99 % | HEART RATE: 76 BPM | TEMPERATURE: 97 F | WEIGHT: 135.81 LBS | BODY MASS INDEX: 27 KG/M2 | RESPIRATION RATE: 16 BRPM | DIASTOLIC BLOOD PRESSURE: 73 MMHG

## 2021-02-04 DIAGNOSIS — C79.31 BRAIN METASTASIS (HCC): Primary | ICD-10-CM

## 2021-02-04 PROCEDURE — 77386 HC IMRT COMPLEX: CPT | Performed by: INTERNAL MEDICINE

## 2021-02-04 RX ORDER — PROCHLORPERAZINE MALEATE 5 MG/1
TABLET ORAL EVERY 6 HOURS PRN
COMMUNITY

## 2021-02-04 RX ORDER — MEMANTINE HYDROCHLORIDE 5 MG/1
TABLET ORAL
Qty: 70 TABLET | Refills: 0 | Status: SHIPPED | OUTPATIENT
Start: 2021-02-04 | End: 2021-03-08

## 2021-02-04 NOTE — PROGRESS NOTES
[facility] Radiation Treatment Management Note 6-10    Patient:  Estefany Alvarado.   Age:  56year old  Visit Diagnosis:  SCLC with brain met  Primary Rad/Onc:  Dr. Wanda Ramos    Site Delivered Dose (cGy) Prescribed Dose (cG

## 2021-02-04 NOTE — PATIENT INSTRUCTIONS
Joanne Instructions. Called in to your pharmacy.   Week one: 5mg in AM    Week two: 5mg in AM, 5mg in PM    Week three: 10mg in AM,  5mg in PM    Week four: 10mg in AM, 10mg in PM    After this you will need a refill of 10mg tablets      Follow up with

## 2021-02-05 PROCEDURE — 77386 HC IMRT COMPLEX: CPT | Performed by: INTERNAL MEDICINE

## 2021-02-05 PROCEDURE — 77336 RADIATION PHYSICS CONSULT: CPT | Performed by: INTERNAL MEDICINE

## 2021-02-08 PROCEDURE — 77386 HC IMRT COMPLEX: CPT | Performed by: INTERNAL MEDICINE

## 2021-02-09 ENCOUNTER — DOCUMENTATION ONLY (OUTPATIENT)
Dept: RADIATION ONCOLOGY | Facility: HOSPITAL | Age: 61
End: 2021-02-09

## 2021-02-09 PROCEDURE — 77386 HC IMRT COMPLEX: CPT | Performed by: INTERNAL MEDICINE

## 2021-02-10 NOTE — PROGRESS NOTES
Radiation Oncology Treatment Summary    Diagnosis: SCLC with brain metastasis    Site: Whole brain    Dose: 3000 cGy in 10 fractions    Treatment Start Date: 1/27/2021    Treatment End Date: 2/9/2021    Elapsed Days: 13     Concurrent Treatments: None    R

## 2021-02-15 ENCOUNTER — NURSE ONLY (OUTPATIENT)
Dept: HEMATOLOGY/ONCOLOGY | Facility: HOSPITAL | Age: 61
End: 2021-02-15
Attending: INTERNAL MEDICINE
Payer: COMMERCIAL

## 2021-02-15 VITALS
WEIGHT: 137 LBS | HEIGHT: 60 IN | DIASTOLIC BLOOD PRESSURE: 71 MMHG | RESPIRATION RATE: 16 BRPM | TEMPERATURE: 98 F | BODY MASS INDEX: 26.9 KG/M2 | SYSTOLIC BLOOD PRESSURE: 126 MMHG | OXYGEN SATURATION: 99 % | HEART RATE: 67 BPM

## 2021-02-15 DIAGNOSIS — R11.2 NAUSEA AND VOMITING, INTRACTABILITY OF VOMITING NOT SPECIFIED, UNSPECIFIED VOMITING TYPE: ICD-10-CM

## 2021-02-15 DIAGNOSIS — C79.31 BRAIN METASTASIS (HCC): ICD-10-CM

## 2021-02-15 DIAGNOSIS — C34.92 SMALL CELL CARCINOMA OF LEFT LUNG (HCC): Primary | ICD-10-CM

## 2021-02-15 DIAGNOSIS — L23.9 ALLERGIC CONTACT DERMATITIS, UNSPECIFIED TRIGGER: ICD-10-CM

## 2021-02-15 DIAGNOSIS — Z51.11 CHEMOTHERAPY MANAGEMENT, ENCOUNTER FOR: ICD-10-CM

## 2021-02-15 DIAGNOSIS — Z45.2 ENCOUNTER FOR CARE RELATED TO VASCULAR ACCESS PORT: ICD-10-CM

## 2021-02-15 LAB
ALBUMIN SERPL-MCNC: 3.4 G/DL (ref 3.4–5)
ALBUMIN/GLOB SERPL: 1 {RATIO} (ref 1–2)
ALP LIVER SERPL-CCNC: 73 U/L
ALT SERPL-CCNC: 21 U/L
ANION GAP SERPL CALC-SCNC: 4 MMOL/L (ref 0–18)
AST SERPL-CCNC: 11 U/L (ref 15–37)
BASOPHILS # BLD AUTO: 0.06 X10(3) UL (ref 0–0.2)
BASOPHILS NFR BLD AUTO: 1.1 %
BILIRUB SERPL-MCNC: 0.4 MG/DL (ref 0.1–2)
BUN BLD-MCNC: 24 MG/DL (ref 7–18)
BUN/CREAT SERPL: 22.4 (ref 10–20)
CALCIUM BLD-MCNC: 9.2 MG/DL (ref 8.5–10.1)
CHLORIDE SERPL-SCNC: 103 MMOL/L (ref 98–112)
CO2 SERPL-SCNC: 32 MMOL/L (ref 21–32)
CREAT BLD-MCNC: 1.07 MG/DL
DEPRECATED RDW RBC AUTO: 44.5 FL (ref 35.1–46.3)
EOSINOPHIL # BLD AUTO: 0.16 X10(3) UL (ref 0–0.7)
EOSINOPHIL NFR BLD AUTO: 2.9 %
ERYTHROCYTE [DISTWIDTH] IN BLOOD BY AUTOMATED COUNT: 12.8 % (ref 11–15)
GLOBULIN PLAS-MCNC: 3.5 G/DL (ref 2.8–4.4)
GLUCOSE BLD-MCNC: 175 MG/DL (ref 70–99)
HCT VFR BLD AUTO: 41.4 %
HGB BLD-MCNC: 14 G/DL
IMM GRANULOCYTES # BLD AUTO: 0.04 X10(3) UL (ref 0–1)
IMM GRANULOCYTES NFR BLD: 0.7 %
LYMPHOCYTES # BLD AUTO: 0.66 X10(3) UL (ref 1–4)
LYMPHOCYTES NFR BLD AUTO: 12.1 %
M PROTEIN MFR SERPL ELPH: 6.9 G/DL (ref 6.4–8.2)
MCH RBC QN AUTO: 31.9 PG (ref 26–34)
MCHC RBC AUTO-ENTMCNC: 33.8 G/DL (ref 31–37)
MCV RBC AUTO: 94.3 FL
MONOCYTES # BLD AUTO: 0.6 X10(3) UL (ref 0.1–1)
MONOCYTES NFR BLD AUTO: 11 %
NEUTROPHILS # BLD AUTO: 3.93 X10 (3) UL (ref 1.5–7.7)
NEUTROPHILS # BLD AUTO: 3.93 X10(3) UL (ref 1.5–7.7)
NEUTROPHILS NFR BLD AUTO: 72.2 %
OSMOLALITY SERPL CALC.SUM OF ELEC: 296 MOSM/KG (ref 275–295)
PLATELET # BLD AUTO: 197 10(3)UL (ref 150–450)
POTASSIUM SERPL-SCNC: 3.9 MMOL/L (ref 3.5–5.1)
RBC # BLD AUTO: 4.39 X10(6)UL
SODIUM SERPL-SCNC: 139 MMOL/L (ref 136–145)
WBC # BLD AUTO: 5.5 X10(3) UL (ref 4–11)

## 2021-02-15 PROCEDURE — 99215 OFFICE O/P EST HI 40 MIN: CPT | Performed by: INTERNAL MEDICINE

## 2021-02-15 PROCEDURE — 80053 COMPREHEN METABOLIC PANEL: CPT

## 2021-02-15 PROCEDURE — 36591 DRAW BLOOD OFF VENOUS DEVICE: CPT

## 2021-02-15 PROCEDURE — 85025 COMPLETE CBC W/AUTO DIFF WBC: CPT

## 2021-02-15 RX ORDER — 0.9 % SODIUM CHLORIDE 0.9 %
10 VIAL (ML) INJECTION ONCE
Status: CANCELLED | OUTPATIENT
Start: 2021-02-15

## 2021-02-15 RX ORDER — HEPARIN SODIUM (PORCINE) LOCK FLUSH IV SOLN 100 UNIT/ML 100 UNIT/ML
5 SOLUTION INTRAVENOUS ONCE
Status: COMPLETED | OUTPATIENT
Start: 2021-02-15 | End: 2021-02-15

## 2021-02-15 RX ORDER — SODIUM CHLORIDE 9 MG/ML
INJECTION INTRAVENOUS
Status: DISCONTINUED
Start: 2021-02-15 | End: 2021-02-15

## 2021-02-15 RX ORDER — HEPARIN SODIUM (PORCINE) LOCK FLUSH IV SOLN 100 UNIT/ML 100 UNIT/ML
5 SOLUTION INTRAVENOUS ONCE
Status: CANCELLED | OUTPATIENT
Start: 2021-02-15

## 2021-02-15 RX ADMIN — HEPARIN SODIUM (PORCINE) LOCK FLUSH IV SOLN 100 UNIT/ML 500 UNITS: 100 SOLUTION INTRAVENOUS at 08:36:00

## 2021-02-15 NOTE — PROGRESS NOTES
[facility] Progress Note    Patient Name: Janel Guajardo. YOB: 1960   Medical Record Number: [id number]   Attending Physician: Darien Smith M.D.      Chief Complaint:  Small cell lung cancer    History of Present Illness:  Cancer h issue with constipation which is controlled with a herbal tea. His PCP has prescribed a steroid cream for quite some time that the patient uses prn. More recently, he has noticed increased pruritus irving on his torso but can include his extremities.       H daily as directed., Disp: 70 tablet, Rfl: 0  •  triamcinolone acetonide 0.1 % External Cream, Apply 1 Application topically 2 (two) times daily.  (as needed to affected area), Disp: 450 g, Rfl: 0  •  Levothyroxine Sodium 75 MCG Oral Tab, Take 1 tablet (75 m BUN 24 (H) 02/15/2021    BUNCREA 22.4 (H) 02/15/2021    CREATSERUM 1.07 02/15/2021    ANIONGAP 4 02/15/2021    GFRNAA 74 02/15/2021    GFRAA 86 02/15/2021    CA 9.2 02/15/2021    OSMOCALC 296 (H) 02/15/2021    ALKPHO 73 02/15/2021    AST 11 (L) 02/15/2021 trends (no safety issue to monitor Q3-6 weeks). Call prn. Follow-up in 3 weeks    MDM: high risk. Sera Moctezuma PA-C    Seen and examined with LOLIS Moctezuma agree with and edited above.   Extensive stage small cell lung cancer brain metastas

## 2021-02-15 NOTE — PATIENT INSTRUCTIONS
1. Proceed with cycle 1 chemotherapy    2. Call if ondansetron and prochlorperazine do not control nausea with this cycle    3. Consider adding loratadine 10 mg once daily (generic claritin) for itching    4. Call if constipation is uncontrolled    5.

## 2021-02-16 ENCOUNTER — NURSE ONLY (OUTPATIENT)
Dept: HEMATOLOGY/ONCOLOGY | Facility: HOSPITAL | Age: 61
End: 2021-02-16
Attending: INTERNAL MEDICINE
Payer: COMMERCIAL

## 2021-02-16 VITALS
HEART RATE: 77 BPM | TEMPERATURE: 98 F | SYSTOLIC BLOOD PRESSURE: 114 MMHG | OXYGEN SATURATION: 98 % | RESPIRATION RATE: 16 BRPM | DIASTOLIC BLOOD PRESSURE: 60 MMHG

## 2021-02-16 DIAGNOSIS — C34.92 SMALL CELL CARCINOMA OF LEFT LUNG (HCC): Primary | ICD-10-CM

## 2021-02-16 DIAGNOSIS — Z45.2 ENCOUNTER FOR CARE RELATED TO VASCULAR ACCESS PORT: ICD-10-CM

## 2021-02-16 PROCEDURE — 96375 TX/PRO/DX INJ NEW DRUG ADDON: CPT

## 2021-02-16 PROCEDURE — 96417 CHEMO IV INFUS EACH ADDL SEQ: CPT

## 2021-02-16 PROCEDURE — 96413 CHEMO IV INFUSION 1 HR: CPT

## 2021-02-16 RX ORDER — HEPARIN SODIUM (PORCINE) LOCK FLUSH IV SOLN 100 UNIT/ML 100 UNIT/ML
SOLUTION INTRAVENOUS
Status: COMPLETED
Start: 2021-02-16 | End: 2021-02-16

## 2021-02-16 RX ORDER — 0.9 % SODIUM CHLORIDE 0.9 %
10 VIAL (ML) INJECTION ONCE
Status: CANCELLED | OUTPATIENT
Start: 2021-02-16

## 2021-02-16 RX ORDER — HEPARIN SODIUM (PORCINE) LOCK FLUSH IV SOLN 100 UNIT/ML 100 UNIT/ML
5 SOLUTION INTRAVENOUS ONCE
Status: CANCELLED | OUTPATIENT
Start: 2021-02-16

## 2021-02-16 RX ORDER — SODIUM CHLORIDE 9 MG/ML
INJECTION INTRAVENOUS
Status: DISCONTINUED
Start: 2021-02-16 | End: 2021-02-16

## 2021-02-16 RX ORDER — HEPARIN SODIUM (PORCINE) LOCK FLUSH IV SOLN 100 UNIT/ML 100 UNIT/ML
5 SOLUTION INTRAVENOUS ONCE
Status: COMPLETED | OUTPATIENT
Start: 2021-02-16 | End: 2021-02-16

## 2021-02-16 RX ADMIN — HEPARIN SODIUM (PORCINE) LOCK FLUSH IV SOLN 100 UNIT/ML 500 UNITS: 100 SOLUTION INTRAVENOUS at 11:14:00

## 2021-02-16 NOTE — PATIENT INSTRUCTIONS
IV Cancer Treatment Name(s): Etoposide and Carboplatin  IV Cancer Treatment Frequency Daily for 3 days, every 21 days                       Number of cycles planned Based on response and tolerability  Plan for appointments and lab testing Once every 3 week needed     Helpful hints during cancer treatment:                  Diet:  ? Avoid greasy or spicy foods on days surrounding chemotherapy  ? Eat small frequent meals per day (6-7 meals) rather than 3 large meals  ?  Choose high calorie/high protein foods (ch or higher on any skin exposed to the sun. Re-apply every 2 hours if in the sun and after bathing or sweating. · For dry skin, use an alcohol-free lotion twice per day, especially after baths.   · If scalp hair loss has occurred, protect the scalp from the

## 2021-02-16 NOTE — PROGRESS NOTES
Pt here for C1D1 Carbo Etoposide.   Arrives Ambulating independently, accompanied by Self         Patient reports possible pregnancy since last therapy cycle: Not Applicable    Modifications in dose or schedule: No. Consent for new treatment scanned in medi

## 2021-02-17 ENCOUNTER — OFFICE VISIT (OUTPATIENT)
Dept: HEMATOLOGY/ONCOLOGY | Facility: HOSPITAL | Age: 61
End: 2021-02-17
Attending: INTERNAL MEDICINE
Payer: COMMERCIAL

## 2021-02-17 VITALS
DIASTOLIC BLOOD PRESSURE: 60 MMHG | SYSTOLIC BLOOD PRESSURE: 111 MMHG | RESPIRATION RATE: 18 BRPM | TEMPERATURE: 98 F | HEART RATE: 72 BPM | OXYGEN SATURATION: 98 %

## 2021-02-17 DIAGNOSIS — Z45.2 ENCOUNTER FOR CARE RELATED TO VASCULAR ACCESS PORT: ICD-10-CM

## 2021-02-17 DIAGNOSIS — C34.92 SMALL CELL CARCINOMA OF LEFT LUNG (HCC): Primary | ICD-10-CM

## 2021-02-17 PROCEDURE — 96375 TX/PRO/DX INJ NEW DRUG ADDON: CPT

## 2021-02-17 PROCEDURE — 96413 CHEMO IV INFUSION 1 HR: CPT

## 2021-02-17 RX ORDER — HEPARIN SODIUM (PORCINE) LOCK FLUSH IV SOLN 100 UNIT/ML 100 UNIT/ML
5 SOLUTION INTRAVENOUS ONCE
Status: COMPLETED | OUTPATIENT
Start: 2021-02-17 | End: 2021-02-17

## 2021-02-17 RX ORDER — HEPARIN SODIUM (PORCINE) LOCK FLUSH IV SOLN 100 UNIT/ML 100 UNIT/ML
SOLUTION INTRAVENOUS
Status: COMPLETED
Start: 2021-02-17 | End: 2021-02-17

## 2021-02-17 RX ORDER — HEPARIN SODIUM (PORCINE) LOCK FLUSH IV SOLN 100 UNIT/ML 100 UNIT/ML
5 SOLUTION INTRAVENOUS ONCE
Status: CANCELLED | OUTPATIENT
Start: 2021-02-17

## 2021-02-17 RX ORDER — 0.9 % SODIUM CHLORIDE 0.9 %
10 VIAL (ML) INJECTION ONCE
Status: DISCONTINUED | OUTPATIENT
Start: 2021-02-17 | End: 2021-02-17

## 2021-02-17 RX ORDER — 0.9 % SODIUM CHLORIDE 0.9 %
10 VIAL (ML) INJECTION ONCE
Status: CANCELLED | OUTPATIENT
Start: 2021-02-17

## 2021-02-17 RX ORDER — SODIUM CHLORIDE 9 MG/ML
INJECTION INTRAVENOUS
Status: DISCONTINUED
Start: 2021-02-17 | End: 2021-02-17

## 2021-02-17 RX ADMIN — HEPARIN SODIUM (PORCINE) LOCK FLUSH IV SOLN 100 UNIT/ML 500 UNITS: 100 SOLUTION INTRAVENOUS at 10:35:00

## 2021-02-17 NOTE — PROGRESS NOTES
Pt here for C1D2 Etoposide. Arrives Ambulating independently, accompanied by Self         Patient reports possible pregnancy since last therapy cycle: Not Applicable    Modifications in dose or schedule: No. Consent for new treatment scanned in media tab.

## 2021-02-18 ENCOUNTER — OFFICE VISIT (OUTPATIENT)
Dept: HEMATOLOGY/ONCOLOGY | Facility: HOSPITAL | Age: 61
End: 2021-02-18
Attending: INTERNAL MEDICINE
Payer: COMMERCIAL

## 2021-02-18 VITALS
HEART RATE: 74 BPM | DIASTOLIC BLOOD PRESSURE: 72 MMHG | SYSTOLIC BLOOD PRESSURE: 126 MMHG | TEMPERATURE: 98 F | OXYGEN SATURATION: 100 % | RESPIRATION RATE: 18 BRPM

## 2021-02-18 DIAGNOSIS — C34.92 SMALL CELL CARCINOMA OF LEFT LUNG (HCC): Primary | ICD-10-CM

## 2021-02-18 DIAGNOSIS — Z45.2 ENCOUNTER FOR CARE RELATED TO VASCULAR ACCESS PORT: ICD-10-CM

## 2021-02-18 PROCEDURE — 96413 CHEMO IV INFUSION 1 HR: CPT

## 2021-02-18 PROCEDURE — 96375 TX/PRO/DX INJ NEW DRUG ADDON: CPT

## 2021-02-18 RX ORDER — HEPARIN SODIUM (PORCINE) LOCK FLUSH IV SOLN 100 UNIT/ML 100 UNIT/ML
5 SOLUTION INTRAVENOUS ONCE
Status: COMPLETED | OUTPATIENT
Start: 2021-02-18 | End: 2021-02-18

## 2021-02-18 RX ORDER — HEPARIN SODIUM (PORCINE) LOCK FLUSH IV SOLN 100 UNIT/ML 100 UNIT/ML
SOLUTION INTRAVENOUS
Status: COMPLETED
Start: 2021-02-18 | End: 2021-02-18

## 2021-02-18 RX ORDER — 0.9 % SODIUM CHLORIDE 0.9 %
10 VIAL (ML) INJECTION ONCE
Status: CANCELLED | OUTPATIENT
Start: 2021-02-18

## 2021-02-18 RX ORDER — HEPARIN SODIUM (PORCINE) LOCK FLUSH IV SOLN 100 UNIT/ML 100 UNIT/ML
5 SOLUTION INTRAVENOUS ONCE
Status: CANCELLED | OUTPATIENT
Start: 2021-02-18

## 2021-02-18 RX ORDER — SODIUM CHLORIDE 9 MG/ML
INJECTION INTRAVENOUS
Status: DISCONTINUED
Start: 2021-02-18 | End: 2021-02-18

## 2021-02-18 RX ADMIN — HEPARIN SODIUM (PORCINE) LOCK FLUSH IV SOLN 100 UNIT/ML 500 UNITS: 100 SOLUTION INTRAVENOUS at 10:55:00

## 2021-02-18 NOTE — PROGRESS NOTES
Pt here for C1D3 Etoposide. Arrives Ambulating independently, accompanied by Self         Patient reports possible pregnancy since last therapy cycle: Not Applicable    Modifications in dose or schedule: No. Having difficulty staying asleep at night.  Revi

## 2021-03-08 ENCOUNTER — NURSE ONLY (OUTPATIENT)
Dept: HEMATOLOGY/ONCOLOGY | Facility: HOSPITAL | Age: 61
End: 2021-03-08
Attending: INTERNAL MEDICINE
Payer: COMMERCIAL

## 2021-03-08 ENCOUNTER — TELEPHONE (OUTPATIENT)
Dept: HEMATOLOGY/ONCOLOGY | Facility: HOSPITAL | Age: 61
End: 2021-03-08

## 2021-03-08 VITALS
HEART RATE: 62 BPM | TEMPERATURE: 98 F | RESPIRATION RATE: 16 BRPM | WEIGHT: 132 LBS | BODY MASS INDEX: 25.91 KG/M2 | HEIGHT: 60 IN | DIASTOLIC BLOOD PRESSURE: 59 MMHG | SYSTOLIC BLOOD PRESSURE: 126 MMHG | OXYGEN SATURATION: 99 %

## 2021-03-08 DIAGNOSIS — T45.1X5A CHEMOTHERAPY INDUCED NEUTROPENIA (HCC): ICD-10-CM

## 2021-03-08 DIAGNOSIS — C79.31 BRAIN METASTASIS (HCC): ICD-10-CM

## 2021-03-08 DIAGNOSIS — C34.92 SMALL CELL CARCINOMA OF LEFT LUNG (HCC): Primary | ICD-10-CM

## 2021-03-08 DIAGNOSIS — D70.1 CHEMOTHERAPY INDUCED NEUTROPENIA (HCC): ICD-10-CM

## 2021-03-08 DIAGNOSIS — Z92.89 HISTORY OF IMMUNOTHERAPY: ICD-10-CM

## 2021-03-08 DIAGNOSIS — C34.92 SMALL CELL CARCINOMA OF LEFT LUNG (HCC): ICD-10-CM

## 2021-03-08 DIAGNOSIS — Z51.11 CHEMOTHERAPY MANAGEMENT, ENCOUNTER FOR: Primary | ICD-10-CM

## 2021-03-08 LAB
ALBUMIN SERPL-MCNC: 3.6 G/DL (ref 3.4–5)
ALBUMIN/GLOB SERPL: 1 {RATIO} (ref 1–2)
ALP LIVER SERPL-CCNC: 76 U/L
ALT SERPL-CCNC: 20 U/L
ANION GAP SERPL CALC-SCNC: 4 MMOL/L (ref 0–18)
AST SERPL-CCNC: 13 U/L (ref 15–37)
BASOPHILS # BLD AUTO: 0.02 X10(3) UL (ref 0–0.2)
BASOPHILS NFR BLD AUTO: 0.9 %
BILIRUB SERPL-MCNC: 0.4 MG/DL (ref 0.1–2)
BUN BLD-MCNC: 23 MG/DL (ref 7–18)
BUN/CREAT SERPL: 21.7 (ref 10–20)
CALCIUM BLD-MCNC: 9 MG/DL (ref 8.5–10.1)
CHLORIDE SERPL-SCNC: 102 MMOL/L (ref 98–112)
CO2 SERPL-SCNC: 33 MMOL/L (ref 21–32)
CREAT BLD-MCNC: 1.06 MG/DL
DEPRECATED RDW RBC AUTO: 44.1 FL (ref 35.1–46.3)
EOSINOPHIL # BLD AUTO: 0.04 X10(3) UL (ref 0–0.7)
EOSINOPHIL NFR BLD AUTO: 1.7 %
ERYTHROCYTE [DISTWIDTH] IN BLOOD BY AUTOMATED COUNT: 13.1 % (ref 11–15)
GLOBULIN PLAS-MCNC: 3.6 G/DL (ref 2.8–4.4)
GLUCOSE BLD-MCNC: 151 MG/DL (ref 70–99)
HCT VFR BLD AUTO: 40 %
HGB BLD-MCNC: 13.5 G/DL
IMM GRANULOCYTES # BLD AUTO: 0.04 X10(3) UL (ref 0–1)
IMM GRANULOCYTES NFR BLD: 1.7 %
LYMPHOCYTES # BLD AUTO: 0.66 X10(3) UL (ref 1–4)
LYMPHOCYTES NFR BLD AUTO: 28.2 %
M PROTEIN MFR SERPL ELPH: 7.2 G/DL (ref 6.4–8.2)
MCH RBC QN AUTO: 31.8 PG (ref 26–34)
MCHC RBC AUTO-ENTMCNC: 33.8 G/DL (ref 31–37)
MCV RBC AUTO: 94.3 FL
MONOCYTES # BLD AUTO: 0.52 X10(3) UL (ref 0.1–1)
MONOCYTES NFR BLD AUTO: 22.2 %
NEUTROPHILS # BLD AUTO: 1.06 X10 (3) UL (ref 1.5–7.7)
NEUTROPHILS # BLD AUTO: 1.06 X10(3) UL (ref 1.5–7.7)
NEUTROPHILS NFR BLD AUTO: 45.3 %
OSMOLALITY SERPL CALC.SUM OF ELEC: 295 MOSM/KG (ref 275–295)
PLATELET # BLD AUTO: 255 10(3)UL (ref 150–450)
POTASSIUM SERPL-SCNC: 4.1 MMOL/L (ref 3.5–5.1)
RBC # BLD AUTO: 4.24 X10(6)UL
SODIUM SERPL-SCNC: 139 MMOL/L (ref 136–145)
WBC # BLD AUTO: 2.3 X10(3) UL (ref 4–11)

## 2021-03-08 PROCEDURE — 36415 COLL VENOUS BLD VENIPUNCTURE: CPT

## 2021-03-08 PROCEDURE — 80053 COMPREHEN METABOLIC PANEL: CPT

## 2021-03-08 PROCEDURE — 99215 OFFICE O/P EST HI 40 MIN: CPT | Performed by: INTERNAL MEDICINE

## 2021-03-08 PROCEDURE — 85060 BLOOD SMEAR INTERPRETATION: CPT

## 2021-03-08 PROCEDURE — 85025 COMPLETE CBC W/AUTO DIFF WBC: CPT

## 2021-03-08 RX ORDER — PROCHLORPERAZINE MALEATE 10 MG
TABLET ORAL
Qty: 150 TABLET | Refills: 0 | Status: SHIPPED | OUTPATIENT
Start: 2021-03-08 | End: 2021-08-10

## 2021-03-08 RX ORDER — MEMANTINE HYDROCHLORIDE 10 MG/1
10 TABLET ORAL 2 TIMES DAILY
Qty: 60 TABLET | Refills: 11 | Status: SHIPPED | OUTPATIENT
Start: 2021-03-08

## 2021-03-08 RX ORDER — MEMANTINE HYDROCHLORIDE 5 MG/1
TABLET ORAL
Qty: 70 TABLET | Refills: 0 | OUTPATIENT
Start: 2021-03-08

## 2021-03-08 NOTE — PROGRESS NOTES
Seen and examined with GENESIS Washington. Agree with her documentation. Recurrent small cell lung cancer brain metastasis here for cycle #2 of retreatment with carboplatin etoposide following brain radiotherapy. On exam is comfortable nonlabored respirations.

## 2021-03-08 NOTE — TELEPHONE ENCOUNTER
Guanaco Green came in to check in for his lab and Dr. Miguel Lee. He asked that he get a call from somebody in Dr. Branden Jasso office to go over a few things. I let him know that they are in clinic and will give him a call whenever they get a chance.

## 2021-03-08 NOTE — PROGRESS NOTES
[facility] Progress Note    Patient Name: Eli Cohn. YOB: 1960   Medical Record Number: [id number]   Attending Physician: Luis Lackey M.D.      Chief Complaint:  Small cell lung cancer    History of Present Illness:  Cancer h sores.       Performance Status:  ECOG 0    Past Medical History:  Past Medical History:   Diagnosis Date   • Brain cancer ([facility]    • Cancer ([facility]     Lung    • Lung cancer ([facility] 01/2020   • Shortness of breath        Past Surgical History:  History review Emotionally Abused:       Physically Abused:       Sexually Abused:       Current Medications:    Current Outpatient Medications:   •  Prochlorperazine Maleate 5 MG Oral, Take by mouth every 6 (six) hours as needed for Nausea., Disp: , Rfl:   •  triamcinol injection 500 Units, 5 mL, Intracatheter, Once, Harris Arriola MD, 500 Units at 02/17/21 1035  [COMPLETED] ondansetron HCl (ZOFRAN) 16 mg, Dexamethasone Sodium Phosphate (DECADRON) 20 mg in sodium chloride 0.9% 110 mL IVPB, , Intravenous, Once, Papi Arriola 03/08/2021    BUNCREA 21.7 (H) 03/08/2021    CREATSERUM 1.06 03/08/2021    ANIONGAP 4 03/08/2021    GFRNAA 75 03/08/2021    GFRAA 87 03/08/2021    CA 9.0 03/08/2021    OSMOCALC 295 03/08/2021    ALKPHO 76 03/08/2021    AST 13 (L) 03/08/2021    ALT 20 03/08 good response. Continue to monitor trends. MDM: high risk.       JOHANNA Suh

## 2021-03-08 NOTE — TELEPHONE ENCOUNTER
Meghan Alston says his memantine was changed to 10mg, but the pharmacy is requesting a new rx or they will not fill this. He is also asking for a call back with directions on the medication.

## 2021-03-09 ENCOUNTER — RESEARCH ENCOUNTER (OUTPATIENT)
Dept: HEMATOLOGY/ONCOLOGY | Facility: HOSPITAL | Age: 61
End: 2021-03-09

## 2021-03-09 ENCOUNTER — OFFICE VISIT (OUTPATIENT)
Dept: HEMATOLOGY/ONCOLOGY | Facility: HOSPITAL | Age: 61
End: 2021-03-09
Attending: INTERNAL MEDICINE
Payer: COMMERCIAL

## 2021-03-09 ENCOUNTER — TELEPHONE (OUTPATIENT)
Dept: RADIATION ONCOLOGY | Facility: HOSPITAL | Age: 61
End: 2021-03-09

## 2021-03-09 VITALS
SYSTOLIC BLOOD PRESSURE: 113 MMHG | HEART RATE: 73 BPM | TEMPERATURE: 98 F | DIASTOLIC BLOOD PRESSURE: 55 MMHG | RESPIRATION RATE: 16 BRPM | OXYGEN SATURATION: 99 %

## 2021-03-09 DIAGNOSIS — Z45.2 ENCOUNTER FOR CARE RELATED TO VASCULAR ACCESS PORT: ICD-10-CM

## 2021-03-09 DIAGNOSIS — C34.92 SMALL CELL CARCINOMA OF LEFT LUNG (HCC): Primary | ICD-10-CM

## 2021-03-09 PROCEDURE — 96417 CHEMO IV INFUS EACH ADDL SEQ: CPT

## 2021-03-09 PROCEDURE — 96413 CHEMO IV INFUSION 1 HR: CPT

## 2021-03-09 PROCEDURE — 96375 TX/PRO/DX INJ NEW DRUG ADDON: CPT

## 2021-03-09 RX ORDER — HEPARIN SODIUM (PORCINE) LOCK FLUSH IV SOLN 100 UNIT/ML 100 UNIT/ML
SOLUTION INTRAVENOUS
Status: DISCONTINUED
Start: 2021-03-09 | End: 2021-03-09

## 2021-03-09 RX ORDER — SODIUM CHLORIDE 9 MG/ML
INJECTION INTRAVENOUS
Status: DISCONTINUED
Start: 2021-03-09 | End: 2021-03-09

## 2021-03-09 RX ORDER — HEPARIN SODIUM (PORCINE) LOCK FLUSH IV SOLN 100 UNIT/ML 100 UNIT/ML
5 SOLUTION INTRAVENOUS ONCE
Status: CANCELLED | OUTPATIENT
Start: 2021-03-09

## 2021-03-09 RX ORDER — 0.9 % SODIUM CHLORIDE 0.9 %
10 VIAL (ML) INJECTION ONCE
Status: CANCELLED | OUTPATIENT
Start: 2021-03-09

## 2021-03-09 RX ORDER — HEPARIN SODIUM (PORCINE) LOCK FLUSH IV SOLN 100 UNIT/ML 100 UNIT/ML
5 SOLUTION INTRAVENOUS ONCE
Status: COMPLETED | OUTPATIENT
Start: 2021-03-09 | End: 2021-03-09

## 2021-03-09 RX ADMIN — HEPARIN SODIUM (PORCINE) LOCK FLUSH IV SOLN 100 UNIT/ML 500 UNITS: 100 SOLUTION INTRAVENOUS at 11:15:00

## 2021-03-09 NOTE — TELEPHONE ENCOUNTER
Kavya Ba calling back to make f/u appt but has questions on Memantine HCl 10 MG Oral Tab.  Thank you howard

## 2021-03-09 NOTE — PROGRESS NOTES
Pt here for C2 D1 Carbo/ Etoposide.   Arrives Ambulating independently, accompanied by Self         Patient reports possible pregnancy since last therapy cycle: Not Applicable    Modifications in dose or schedule: No.  Ok to proceed with ANC 1.06, reviewed Record    Learner:  Patient  Barriers / Limitations:  Fatigue at times - none today  Method:  discussion  Outcome:  Expressed understanding  Comments: AVS provided/ reviewed

## 2021-03-09 NOTE — PROGRESS NOTES
STUDY:   ID # --[id number]  3 months post progression visit    Patient seen and examined per MD on 3/8/21. Currently receiving Cycle 2 Etoposide/Carboplatin. No recent scans performed.    Seems to be tolerating with occasional c/o nausea resolved

## 2021-03-10 ENCOUNTER — OFFICE VISIT (OUTPATIENT)
Dept: HEMATOLOGY/ONCOLOGY | Facility: HOSPITAL | Age: 61
End: 2021-03-10
Attending: INTERNAL MEDICINE
Payer: COMMERCIAL

## 2021-03-10 VITALS
HEART RATE: 72 BPM | DIASTOLIC BLOOD PRESSURE: 44 MMHG | RESPIRATION RATE: 16 BRPM | SYSTOLIC BLOOD PRESSURE: 101 MMHG | OXYGEN SATURATION: 100 % | TEMPERATURE: 98 F

## 2021-03-10 DIAGNOSIS — C34.92 SMALL CELL CARCINOMA OF LEFT LUNG (HCC): Primary | ICD-10-CM

## 2021-03-10 DIAGNOSIS — C34.92 SMALL CELL CARCINOMA OF LEFT LUNG (HCC): ICD-10-CM

## 2021-03-10 DIAGNOSIS — C79.31 BRAIN METASTASIS (HCC): ICD-10-CM

## 2021-03-10 DIAGNOSIS — Z51.11 CHEMOTHERAPY MANAGEMENT, ENCOUNTER FOR: Primary | ICD-10-CM

## 2021-03-10 DIAGNOSIS — L30.9 ACUTE DERMATITIS: ICD-10-CM

## 2021-03-10 DIAGNOSIS — L53.9 ERYTHEMA OF SKIN: ICD-10-CM

## 2021-03-10 DIAGNOSIS — Z45.2 ENCOUNTER FOR CARE RELATED TO VASCULAR ACCESS PORT: ICD-10-CM

## 2021-03-10 DIAGNOSIS — I95.1 ORTHOSTATIC HYPOTENSION: ICD-10-CM

## 2021-03-10 PROBLEM — I95.9 HYPOTENSION: Status: ACTIVE | Noted: 2021-03-10

## 2021-03-10 PROBLEM — R21 RASH: Status: ACTIVE | Noted: 2021-03-10

## 2021-03-10 PROCEDURE — 96413 CHEMO IV INFUSION 1 HR: CPT

## 2021-03-10 PROCEDURE — 96361 HYDRATE IV INFUSION ADD-ON: CPT

## 2021-03-10 PROCEDURE — S0028 INJECTION, FAMOTIDINE, 20 MG: HCPCS

## 2021-03-10 PROCEDURE — 99215 OFFICE O/P EST HI 40 MIN: CPT | Performed by: NURSE PRACTITIONER

## 2021-03-10 PROCEDURE — 36593 DECLOT VASCULAR DEVICE: CPT

## 2021-03-10 PROCEDURE — 96374 THER/PROPH/DIAG INJ IV PUSH: CPT

## 2021-03-10 PROCEDURE — A4216 STERILE WATER/SALINE, 10 ML: HCPCS

## 2021-03-10 PROCEDURE — 96375 TX/PRO/DX INJ NEW DRUG ADDON: CPT

## 2021-03-10 RX ORDER — FAMOTIDINE 10 MG/ML
20 INJECTION, SOLUTION INTRAVENOUS ONCE
Status: COMPLETED | OUTPATIENT
Start: 2021-03-10 | End: 2021-03-10

## 2021-03-10 RX ORDER — HEPARIN SODIUM (PORCINE) LOCK FLUSH IV SOLN 100 UNIT/ML 100 UNIT/ML
5 SOLUTION INTRAVENOUS ONCE
Status: CANCELLED | OUTPATIENT
Start: 2021-03-10

## 2021-03-10 RX ORDER — FAMOTIDINE 10 MG/ML
INJECTION, SOLUTION INTRAVENOUS
Status: COMPLETED
Start: 2021-03-10 | End: 2021-03-10

## 2021-03-10 RX ORDER — SODIUM CHLORIDE 9 MG/ML
INJECTION INTRAVENOUS
Status: DISCONTINUED
Start: 2021-03-10 | End: 2021-03-10

## 2021-03-10 RX ORDER — HEPARIN SODIUM (PORCINE) LOCK FLUSH IV SOLN 100 UNIT/ML 100 UNIT/ML
5 SOLUTION INTRAVENOUS ONCE
Status: COMPLETED | OUTPATIENT
Start: 2021-03-10 | End: 2021-03-10

## 2021-03-10 RX ORDER — HEPARIN SODIUM (PORCINE) LOCK FLUSH IV SOLN 100 UNIT/ML 100 UNIT/ML
SOLUTION INTRAVENOUS
Status: DISCONTINUED
Start: 2021-03-10 | End: 2021-03-10

## 2021-03-10 RX ORDER — 0.9 % SODIUM CHLORIDE 0.9 %
10 VIAL (ML) INJECTION ONCE
Status: CANCELLED | OUTPATIENT
Start: 2021-03-10

## 2021-03-10 RX ADMIN — FAMOTIDINE 20 MG: 10 INJECTION, SOLUTION INTRAVENOUS at 09:55:00

## 2021-03-10 RX ADMIN — HEPARIN SODIUM (PORCINE) LOCK FLUSH IV SOLN 100 UNIT/ML 500 UNITS: 100 SOLUTION INTRAVENOUS at 11:35:00

## 2021-03-10 NOTE — PROGRESS NOTES
Pt here for C2 D1 Carbo/ Etoposide. Arrives Ambulating independently, accompanied by Self         Patient reports possible pregnancy since last therapy cycle: Not Applicable    Modifications in dose or schedule: No.  Ok to proceed with ANC 1.06.     Karol appetite  fatigue  knowledge deficit  nausea and vomiting  Problems related to:    chemotherapy  disease/disease progression  Interventions:  nausea/vomiting teaching  patient/family supported  chemotherapy teaching  educate regarding self care  encourage

## 2021-03-10 NOTE — PROGRESS NOTES
[facility] Progress Note    Patient Name: Roxann Obregon. YOB: 1960   Medical Record Number: [id number]   Attending Physician: Kathleen Oneill M.D.      Chief Complaint:  Small cell lung cancer    History of Present Illness:  Cancer h Still with no pruritis, vital signs changes or respiratory distress. Hx radiation and immune mediated dermatitis and allergic dermatitis. Also takes multiple vitamins and supplements, but could not name them all this morning (will bring list tomorrow).   [place] per Week:       Minutes of Exercise per Session:   Stress:       Feeling of Stress :   Social Connections:       Frequency of Communication with Friends and Family:       Frequency of Social Gatherings with Friends and Family:       Attends [employer] (DECADRON) 8 mg in sodium chloride 0.9% 100.8 mL IVPB, 8 mg, Intravenous, Once, Joanne Canales MD, Stopped at 03/10/21 0955  [COMPLETED] etoposide (VEPESID) 160 mg in sodium chloride 0.9% 508 mL chemo-infusion, 100 mg/m2 (Treatment Plan Recorded), Isela Allen Recorded), Intravenous, Once, Ozzy Macario MD, Stopped at 02/18/21 1044  [COMPLETED] Heparin Sodium Lock Flush 100 UNIT/ML injection 500 Units, 5 mL, Intracatheter, Once, Guy Arriola MD, 500 Units at 02/18/21 1055  [COMPLETED] Dexamethasone Sodium Adarsh lymphadenopathy. Neck is supple. Chest: Symmetric expansion, nonlabored breathing, CTA  Cardiovascular: RRR without murmur  Abdomen: Soft, non tender, non distended, NABS x 4  Extremities: No edema. Neurological: 5/5 motor x4.     Skin:  Erythema to bilat 2/9/21 (Petras, 3000 cGy). Tolerated well with no residual neurological deficits or AEs.     --Proceed with Cycle 2 of 4 of etoposide/carboplatin. --Ok to treat with ANC of 1.06.  Will monitor treatment-induced neutropenia closely, reviewed neutropenic

## 2021-03-11 ENCOUNTER — OFFICE VISIT (OUTPATIENT)
Dept: HEMATOLOGY/ONCOLOGY | Facility: HOSPITAL | Age: 61
End: 2021-03-11
Attending: INTERNAL MEDICINE
Payer: COMMERCIAL

## 2021-03-11 VITALS
RESPIRATION RATE: 16 BRPM | SYSTOLIC BLOOD PRESSURE: 104 MMHG | TEMPERATURE: 97 F | OXYGEN SATURATION: 100 % | DIASTOLIC BLOOD PRESSURE: 52 MMHG | HEART RATE: 70 BPM

## 2021-03-11 DIAGNOSIS — I95.1 ORTHOSTATIC HYPOTENSION: ICD-10-CM

## 2021-03-11 DIAGNOSIS — C34.92 SMALL CELL CARCINOMA OF LEFT LUNG (HCC): ICD-10-CM

## 2021-03-11 DIAGNOSIS — R21 RASH: Primary | ICD-10-CM

## 2021-03-11 DIAGNOSIS — Z45.2 ENCOUNTER FOR CARE RELATED TO VASCULAR ACCESS PORT: ICD-10-CM

## 2021-03-11 PROCEDURE — S0028 INJECTION, FAMOTIDINE, 20 MG: HCPCS

## 2021-03-11 PROCEDURE — 96413 CHEMO IV INFUSION 1 HR: CPT

## 2021-03-11 PROCEDURE — 96375 TX/PRO/DX INJ NEW DRUG ADDON: CPT

## 2021-03-11 RX ORDER — HEPARIN SODIUM (PORCINE) LOCK FLUSH IV SOLN 100 UNIT/ML 100 UNIT/ML
5 SOLUTION INTRAVENOUS ONCE
Status: CANCELLED | OUTPATIENT
Start: 2021-03-11

## 2021-03-11 RX ORDER — SODIUM CHLORIDE 9 MG/ML
INJECTION INTRAVENOUS
Status: DISCONTINUED
Start: 2021-03-11 | End: 2021-03-11

## 2021-03-11 RX ORDER — HEPARIN SODIUM (PORCINE) LOCK FLUSH IV SOLN 100 UNIT/ML 100 UNIT/ML
SOLUTION INTRAVENOUS
Status: COMPLETED
Start: 2021-03-11 | End: 2021-03-11

## 2021-03-11 RX ORDER — FAMOTIDINE 10 MG/ML
20 INJECTION, SOLUTION INTRAVENOUS ONCE
Status: COMPLETED | OUTPATIENT
Start: 2021-03-11 | End: 2021-03-11

## 2021-03-11 RX ORDER — 0.9 % SODIUM CHLORIDE 0.9 %
10 VIAL (ML) INJECTION ONCE
Status: CANCELLED | OUTPATIENT
Start: 2021-03-11

## 2021-03-11 RX ORDER — HEPARIN SODIUM (PORCINE) LOCK FLUSH IV SOLN 100 UNIT/ML 100 UNIT/ML
5 SOLUTION INTRAVENOUS ONCE
Status: COMPLETED | OUTPATIENT
Start: 2021-03-11 | End: 2021-03-11

## 2021-03-11 RX ORDER — FAMOTIDINE 10 MG/ML
INJECTION, SOLUTION INTRAVENOUS
Status: COMPLETED
Start: 2021-03-11 | End: 2021-03-11

## 2021-03-11 RX ADMIN — FAMOTIDINE 20 MG: 10 INJECTION, SOLUTION INTRAVENOUS at 08:51:00

## 2021-03-11 RX ADMIN — HEPARIN SODIUM (PORCINE) LOCK FLUSH IV SOLN 100 UNIT/ML 500 UNITS: 100 SOLUTION INTRAVENOUS at 10:30:00

## 2021-03-17 ENCOUNTER — OFFICE VISIT (OUTPATIENT)
Dept: RADIATION ONCOLOGY | Facility: HOSPITAL | Age: 61
End: 2021-03-17
Attending: INTERNAL MEDICINE
Payer: COMMERCIAL

## 2021-03-17 VITALS
RESPIRATION RATE: 16 BRPM | WEIGHT: 132.38 LBS | SYSTOLIC BLOOD PRESSURE: 124 MMHG | HEIGHT: 60 IN | TEMPERATURE: 97 F | HEART RATE: 66 BPM | DIASTOLIC BLOOD PRESSURE: 65 MMHG | BODY MASS INDEX: 25.99 KG/M2

## 2021-03-17 DIAGNOSIS — Z23 NEED FOR VACCINATION: ICD-10-CM

## 2021-03-17 DIAGNOSIS — C79.31 BRAIN METASTASES (HCC): Primary | ICD-10-CM

## 2021-03-17 PROCEDURE — 99211 OFF/OP EST MAY X REQ PHY/QHP: CPT

## 2021-03-17 NOTE — PROGRESS NOTES
Pt seen in 1 month follow up with Dr Waleska Brown having completed WBRT February, 2021. Continues to receive carbo/DDP per Dr Monica Raphael. No c/o headaches. Denies having any memory changes. Continues to take Namenda.  Pt states \"I don't take any vaccines\" and will no

## 2021-03-18 NOTE — PROGRESS NOTES
[facility]  Radiation Oncology Follow-up    Patient Name: Janel Guajardo.    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

## 2021-03-29 ENCOUNTER — NURSE ONLY (OUTPATIENT)
Dept: HEMATOLOGY/ONCOLOGY | Facility: HOSPITAL | Age: 61
End: 2021-03-29
Attending: INTERNAL MEDICINE
Payer: COMMERCIAL

## 2021-03-29 ENCOUNTER — OFFICE VISIT (OUTPATIENT)
Dept: HEMATOLOGY/ONCOLOGY | Facility: HOSPITAL | Age: 61
End: 2021-03-29
Attending: NURSE PRACTITIONER
Payer: COMMERCIAL

## 2021-03-29 VITALS
SYSTOLIC BLOOD PRESSURE: 127 MMHG | HEART RATE: 77 BPM | WEIGHT: 128 LBS | OXYGEN SATURATION: 99 % | BODY MASS INDEX: 25.13 KG/M2 | RESPIRATION RATE: 16 BRPM | TEMPERATURE: 98 F | HEIGHT: 60 IN | DIASTOLIC BLOOD PRESSURE: 69 MMHG

## 2021-03-29 DIAGNOSIS — D64.81 ANTINEOPLASTIC CHEMOTHERAPY INDUCED ANEMIA: ICD-10-CM

## 2021-03-29 DIAGNOSIS — C79.31 BRAIN METASTASIS (HCC): ICD-10-CM

## 2021-03-29 DIAGNOSIS — C34.92 SMALL CELL CARCINOMA OF LEFT LUNG (HCC): ICD-10-CM

## 2021-03-29 DIAGNOSIS — E03.9 ACQUIRED HYPOTHYROIDISM: ICD-10-CM

## 2021-03-29 DIAGNOSIS — T45.1X5A ANTINEOPLASTIC CHEMOTHERAPY INDUCED ANEMIA: ICD-10-CM

## 2021-03-29 DIAGNOSIS — R21 RASH: ICD-10-CM

## 2021-03-29 DIAGNOSIS — I95.1 ORTHOSTATIC HYPOTENSION: Primary | ICD-10-CM

## 2021-03-29 DIAGNOSIS — Z45.2 ENCOUNTER FOR CARE RELATED TO VASCULAR ACCESS PORT: ICD-10-CM

## 2021-03-29 DIAGNOSIS — Z92.89 HISTORY OF IMMUNOTHERAPY: ICD-10-CM

## 2021-03-29 DIAGNOSIS — Z51.11 CHEMOTHERAPY MANAGEMENT, ENCOUNTER FOR: Primary | ICD-10-CM

## 2021-03-29 LAB
ALBUMIN SERPL-MCNC: 3.4 G/DL (ref 3.4–5)
ALBUMIN/GLOB SERPL: 1.1 {RATIO} (ref 1–2)
ALP LIVER SERPL-CCNC: 59 U/L
ALT SERPL-CCNC: 19 U/L
ANION GAP SERPL CALC-SCNC: 5 MMOL/L (ref 0–18)
AST SERPL-CCNC: 8 U/L (ref 15–37)
BASOPHILS # BLD AUTO: 0.02 X10(3) UL (ref 0–0.2)
BASOPHILS NFR BLD AUTO: 0.8 %
BILIRUB SERPL-MCNC: 0.4 MG/DL (ref 0.1–2)
BUN BLD-MCNC: 16 MG/DL (ref 7–18)
BUN/CREAT SERPL: 16 (ref 10–20)
CALCIUM BLD-MCNC: 8.6 MG/DL (ref 8.5–10.1)
CHLORIDE SERPL-SCNC: 104 MMOL/L (ref 98–112)
CO2 SERPL-SCNC: 30 MMOL/L (ref 21–32)
CREAT BLD-MCNC: 1 MG/DL
DEPRECATED RDW RBC AUTO: 46.6 FL (ref 35.1–46.3)
EOSINOPHIL # BLD AUTO: 0.04 X10(3) UL (ref 0–0.7)
EOSINOPHIL NFR BLD AUTO: 1.6 %
ERYTHROCYTE [DISTWIDTH] IN BLOOD BY AUTOMATED COUNT: 13.8 % (ref 11–15)
GLOBULIN PLAS-MCNC: 3.2 G/DL (ref 2.8–4.4)
GLUCOSE BLD-MCNC: 175 MG/DL (ref 70–99)
HCT VFR BLD AUTO: 34.3 %
HGB BLD-MCNC: 11.6 G/DL
IMM GRANULOCYTES # BLD AUTO: 0.04 X10(3) UL (ref 0–1)
IMM GRANULOCYTES NFR BLD: 1.6 %
LYMPHOCYTES # BLD AUTO: 0.45 X10(3) UL (ref 1–4)
LYMPHOCYTES NFR BLD AUTO: 18.1 %
M PROTEIN MFR SERPL ELPH: 6.6 G/DL (ref 6.4–8.2)
MCH RBC QN AUTO: 32 PG (ref 26–34)
MCHC RBC AUTO-ENTMCNC: 33.8 G/DL (ref 31–37)
MCV RBC AUTO: 94.5 FL
MONOCYTES # BLD AUTO: 0.4 X10(3) UL (ref 0.1–1)
MONOCYTES NFR BLD AUTO: 16.1 %
NEUTROPHILS # BLD AUTO: 1.53 X10 (3) UL (ref 1.5–7.7)
NEUTROPHILS # BLD AUTO: 1.53 X10(3) UL (ref 1.5–7.7)
NEUTROPHILS NFR BLD AUTO: 61.8 %
OSMOLALITY SERPL CALC.SUM OF ELEC: 293 MOSM/KG (ref 275–295)
PLATELET # BLD AUTO: 245 10(3)UL (ref 150–450)
POTASSIUM SERPL-SCNC: 3.8 MMOL/L (ref 3.5–5.1)
RBC # BLD AUTO: 3.63 X10(6)UL
SODIUM SERPL-SCNC: 139 MMOL/L (ref 136–145)
TSI SER-ACNC: 0.85 MIU/ML (ref 0.36–3.74)
WBC # BLD AUTO: 2.5 X10(3) UL (ref 4–11)

## 2021-03-29 PROCEDURE — 36591 DRAW BLOOD OFF VENOUS DEVICE: CPT

## 2021-03-29 PROCEDURE — 99215 OFFICE O/P EST HI 40 MIN: CPT | Performed by: NURSE PRACTITIONER

## 2021-03-29 PROCEDURE — 80053 COMPREHEN METABOLIC PANEL: CPT

## 2021-03-29 PROCEDURE — 84443 ASSAY THYROID STIM HORMONE: CPT

## 2021-03-29 PROCEDURE — 85025 COMPLETE CBC W/AUTO DIFF WBC: CPT

## 2021-03-29 RX ORDER — FAMOTIDINE 10 MG/ML
20 INJECTION, SOLUTION INTRAVENOUS ONCE
Status: CANCELLED
Start: 2021-03-30 | End: 2021-03-30

## 2021-03-29 RX ORDER — FAMOTIDINE 10 MG/ML
20 INJECTION, SOLUTION INTRAVENOUS ONCE
Status: CANCELLED
Start: 2021-03-31 | End: 2021-03-31

## 2021-03-29 RX ORDER — 0.9 % SODIUM CHLORIDE 0.9 %
10 VIAL (ML) INJECTION ONCE
Status: CANCELLED | OUTPATIENT
Start: 2021-03-29

## 2021-03-29 RX ORDER — HEPARIN SODIUM (PORCINE) LOCK FLUSH IV SOLN 100 UNIT/ML 100 UNIT/ML
5 SOLUTION INTRAVENOUS ONCE
Status: CANCELLED | OUTPATIENT
Start: 2021-03-29

## 2021-03-29 RX ORDER — SODIUM CHLORIDE 9 MG/ML
INJECTION INTRAVENOUS
Status: DISPENSED
Start: 2021-03-29 | End: 2021-03-30

## 2021-03-29 RX ORDER — HEPARIN SODIUM (PORCINE) LOCK FLUSH IV SOLN 100 UNIT/ML 100 UNIT/ML
5 SOLUTION INTRAVENOUS ONCE
Status: COMPLETED | OUTPATIENT
Start: 2021-03-29 | End: 2021-03-29

## 2021-03-29 RX ORDER — FAMOTIDINE 10 MG/ML
20 INJECTION, SOLUTION INTRAVENOUS ONCE
Status: CANCELLED
Start: 2021-04-01 | End: 2021-04-01

## 2021-03-29 RX ADMIN — HEPARIN SODIUM (PORCINE) LOCK FLUSH IV SOLN 100 UNIT/ML 500 UNITS: 100 SOLUTION INTRAVENOUS at 10:58:00

## 2021-03-29 NOTE — PROGRESS NOTES
[facility] Progress Note    Patient Name: Cyrus Mondragon. YOB: 1960   Medical Record Number: [id number]   Attending Physician: Brandon Garcia M.D.      Chief Complaint:  Small cell lung cancer    History of Present Illness:  Cancer h resolved. Has chronic mild tinnitus from occupational exposure that is not changed. No ear pain or sinus congestion. No infectious respiratory symptoms.   Describes these minute episodes as a warm flushing sensation, and not necessarily worse with position Out of Food in the Last Year:   Transportation Needs:       Lack of Transportation (Medical):       Lack of Transportation (Non-Medical):   Physical Activity:       Days of Exercise per Week:       Minutes of Exercise per Session:   Stress:       Feeling o 0  Levothyroxine Sodium 75 MCG Oral Tab, Take 1 tablet (75 mcg total) by mouth every morning before breakfast., Disp: 90 tablet, Rfl: 1    [COMPLETED] Heparin Sodium Lock Flush 100 UNIT/ML injection 500 Units, 5 mL, Intracatheter, Once, Shelly Smith MD, IVPB, , Intravenous, Once, Cheyenne Wilson MD, Stopped at 03/09/21 0908  [COMPLETED] CARBOplatin (PARAPLATIN) 440 mg in sodium chloride 0.9% 294 mL chemo-infusion, 440 mg, Intravenous, Once, Cheyenne Wilson MD, Stopped at 03/09/21 0950  [COMPLETED] etoposide ALB 3.4 03/29/2021    GLOBULIN 3.2 03/29/2021     03/29/2021    K 3.8 03/29/2021     03/29/2021    CO2 30.0 03/29/2021       Radiology:  1.3 cm cerebellar lesion likely metastatic on brain MRI    Cancer Staging  No matching staging information uncontrolled. --Allergic dermatitis:  Steroid crm prn. Stable.  Unclear if worse dermatitis (non-pruritic) with C2 with erythema to chest and face during last cycle was radiation recall dermatitis (?) vs dermatitis r/t prior immune-mediated vs atopic fla

## 2021-03-30 ENCOUNTER — APPOINTMENT (OUTPATIENT)
Dept: HEMATOLOGY/ONCOLOGY | Facility: HOSPITAL | Age: 61
End: 2021-03-30
Attending: INTERNAL MEDICINE
Payer: COMMERCIAL

## 2021-03-30 ENCOUNTER — APPOINTMENT (OUTPATIENT)
Dept: HEMATOLOGY/ONCOLOGY | Facility: HOSPITAL | Age: 61
End: 2021-03-30
Attending: PHYSICIAN ASSISTANT
Payer: COMMERCIAL

## 2021-03-30 VITALS
DIASTOLIC BLOOD PRESSURE: 57 MMHG | HEART RATE: 83 BPM | SYSTOLIC BLOOD PRESSURE: 116 MMHG | TEMPERATURE: 99 F | OXYGEN SATURATION: 98 % | RESPIRATION RATE: 16 BRPM

## 2021-03-30 DIAGNOSIS — Z45.2 ENCOUNTER FOR CARE RELATED TO VASCULAR ACCESS PORT: ICD-10-CM

## 2021-03-30 DIAGNOSIS — C34.92 SMALL CELL CARCINOMA OF LEFT LUNG (HCC): ICD-10-CM

## 2021-03-30 DIAGNOSIS — I95.1 ORTHOSTATIC HYPOTENSION: Primary | ICD-10-CM

## 2021-03-30 DIAGNOSIS — Z51.11 CHEMOTHERAPY MANAGEMENT, ENCOUNTER FOR: ICD-10-CM

## 2021-03-30 PROCEDURE — 96361 HYDRATE IV INFUSION ADD-ON: CPT

## 2021-03-30 PROCEDURE — 96417 CHEMO IV INFUS EACH ADDL SEQ: CPT

## 2021-03-30 PROCEDURE — 96375 TX/PRO/DX INJ NEW DRUG ADDON: CPT

## 2021-03-30 PROCEDURE — S0028 INJECTION, FAMOTIDINE, 20 MG: HCPCS

## 2021-03-30 PROCEDURE — 96413 CHEMO IV INFUSION 1 HR: CPT

## 2021-03-30 RX ORDER — SODIUM CHLORIDE 9 MG/ML
INJECTION INTRAVENOUS
Status: DISCONTINUED
Start: 2021-03-30 | End: 2021-03-30

## 2021-03-30 RX ORDER — HEPARIN SODIUM (PORCINE) LOCK FLUSH IV SOLN 100 UNIT/ML 100 UNIT/ML
5 SOLUTION INTRAVENOUS ONCE
Status: CANCELLED | OUTPATIENT
Start: 2021-03-30

## 2021-03-30 RX ORDER — HEPARIN SODIUM (PORCINE) LOCK FLUSH IV SOLN 100 UNIT/ML 100 UNIT/ML
SOLUTION INTRAVENOUS
Status: COMPLETED
Start: 2021-03-30 | End: 2021-03-30

## 2021-03-30 RX ORDER — 0.9 % SODIUM CHLORIDE 0.9 %
10 VIAL (ML) INJECTION ONCE
Status: CANCELLED | OUTPATIENT
Start: 2021-03-30

## 2021-03-30 RX ORDER — HEPARIN SODIUM (PORCINE) LOCK FLUSH IV SOLN 100 UNIT/ML 100 UNIT/ML
5 SOLUTION INTRAVENOUS ONCE
Status: COMPLETED | OUTPATIENT
Start: 2021-03-30 | End: 2021-03-30

## 2021-03-30 RX ORDER — FAMOTIDINE 10 MG/ML
20 INJECTION, SOLUTION INTRAVENOUS ONCE
Status: COMPLETED | OUTPATIENT
Start: 2021-03-30 | End: 2021-03-30

## 2021-03-30 RX ORDER — FAMOTIDINE 10 MG/ML
INJECTION, SOLUTION INTRAVENOUS
Status: COMPLETED
Start: 2021-03-30 | End: 2021-03-30

## 2021-03-30 RX ADMIN — HEPARIN SODIUM (PORCINE) LOCK FLUSH IV SOLN 100 UNIT/ML 500 UNITS: 100 SOLUTION INTRAVENOUS at 12:05:00

## 2021-03-30 RX ADMIN — FAMOTIDINE 20 MG: 10 INJECTION, SOLUTION INTRAVENOUS at 08:57:00

## 2021-03-30 NOTE — PROGRESS NOTES
Pt here for C3 D1 Carbo/ Etoposide. Arrives Ambulating independently, accompanied by Self         Patient reports possible pregnancy since last therapy cycle: Not Applicable    Modifications in dose or schedule: No. anc within parameters.   Adding hydratio infection  maintains/gains weight  no active bleeding  nutritional needs met  optimal lab values  oral membranes intact  O2 saturations are WNL for patient  patient supported/coping well  safe in environment  symptoms relieved/minimized  understands plan o

## 2021-03-31 ENCOUNTER — OFFICE VISIT (OUTPATIENT)
Dept: HEMATOLOGY/ONCOLOGY | Facility: HOSPITAL | Age: 61
End: 2021-03-31
Attending: INTERNAL MEDICINE
Payer: COMMERCIAL

## 2021-03-31 VITALS
OXYGEN SATURATION: 100 % | TEMPERATURE: 98 F | SYSTOLIC BLOOD PRESSURE: 108 MMHG | DIASTOLIC BLOOD PRESSURE: 87 MMHG | HEART RATE: 96 BPM | RESPIRATION RATE: 16 BRPM

## 2021-03-31 DIAGNOSIS — C34.92 SMALL CELL CARCINOMA OF LEFT LUNG (HCC): Primary | ICD-10-CM

## 2021-03-31 DIAGNOSIS — I95.1 ORTHOSTATIC HYPOTENSION: ICD-10-CM

## 2021-03-31 DIAGNOSIS — Z51.11 CHEMOTHERAPY MANAGEMENT, ENCOUNTER FOR: ICD-10-CM

## 2021-03-31 DIAGNOSIS — Z45.2 ENCOUNTER FOR CARE RELATED TO VASCULAR ACCESS PORT: ICD-10-CM

## 2021-03-31 PROCEDURE — 96413 CHEMO IV INFUSION 1 HR: CPT

## 2021-03-31 PROCEDURE — S0028 INJECTION, FAMOTIDINE, 20 MG: HCPCS

## 2021-03-31 PROCEDURE — 96375 TX/PRO/DX INJ NEW DRUG ADDON: CPT

## 2021-03-31 RX ORDER — SODIUM CHLORIDE 9 MG/ML
INJECTION INTRAVENOUS
Status: DISCONTINUED
Start: 2021-03-31 | End: 2021-03-31

## 2021-03-31 RX ORDER — FAMOTIDINE 10 MG/ML
INJECTION, SOLUTION INTRAVENOUS
Status: COMPLETED
Start: 2021-03-31 | End: 2021-03-31

## 2021-03-31 RX ORDER — 0.9 % SODIUM CHLORIDE 0.9 %
10 VIAL (ML) INJECTION ONCE
Status: CANCELLED | OUTPATIENT
Start: 2021-03-31

## 2021-03-31 RX ORDER — HEPARIN SODIUM (PORCINE) LOCK FLUSH IV SOLN 100 UNIT/ML 100 UNIT/ML
SOLUTION INTRAVENOUS
Status: COMPLETED
Start: 2021-03-31 | End: 2021-03-31

## 2021-03-31 RX ORDER — FAMOTIDINE 10 MG/ML
20 INJECTION, SOLUTION INTRAVENOUS ONCE
Status: COMPLETED | OUTPATIENT
Start: 2021-03-31 | End: 2021-03-31

## 2021-03-31 RX ORDER — 0.9 % SODIUM CHLORIDE 0.9 %
10 VIAL (ML) INJECTION ONCE
Status: DISCONTINUED | OUTPATIENT
Start: 2021-03-31 | End: 2021-03-31

## 2021-03-31 RX ORDER — HEPARIN SODIUM (PORCINE) LOCK FLUSH IV SOLN 100 UNIT/ML 100 UNIT/ML
5 SOLUTION INTRAVENOUS ONCE
Status: COMPLETED | OUTPATIENT
Start: 2021-03-31 | End: 2021-03-31

## 2021-03-31 RX ORDER — HEPARIN SODIUM (PORCINE) LOCK FLUSH IV SOLN 100 UNIT/ML 100 UNIT/ML
5 SOLUTION INTRAVENOUS ONCE
Status: CANCELLED | OUTPATIENT
Start: 2021-03-31

## 2021-03-31 RX ADMIN — FAMOTIDINE 20 MG: 10 INJECTION, SOLUTION INTRAVENOUS at 09:20:00

## 2021-03-31 RX ADMIN — HEPARIN SODIUM (PORCINE) LOCK FLUSH IV SOLN 100 UNIT/ML 500 UNITS: 100 SOLUTION INTRAVENOUS at 11:16:00

## 2021-03-31 NOTE — PROGRESS NOTES
Pt here for C3D2 Etoposide. Arrives Ambulating independently, accompanied by Self         Patient presents today with redness to face and upper trunk- redness noted patient states it feels warm, but no itching- [address] [facility] APN notified.      Pregnancy screeni

## 2021-04-01 ENCOUNTER — OFFICE VISIT (OUTPATIENT)
Dept: HEMATOLOGY/ONCOLOGY | Facility: HOSPITAL | Age: 61
End: 2021-04-01
Attending: INTERNAL MEDICINE
Payer: COMMERCIAL

## 2021-04-01 VITALS
HEART RATE: 65 BPM | SYSTOLIC BLOOD PRESSURE: 103 MMHG | DIASTOLIC BLOOD PRESSURE: 42 MMHG | TEMPERATURE: 98 F | RESPIRATION RATE: 16 BRPM | OXYGEN SATURATION: 100 %

## 2021-04-01 DIAGNOSIS — Z51.11 CHEMOTHERAPY MANAGEMENT, ENCOUNTER FOR: ICD-10-CM

## 2021-04-01 DIAGNOSIS — Z45.2 ENCOUNTER FOR CARE RELATED TO VASCULAR ACCESS PORT: ICD-10-CM

## 2021-04-01 DIAGNOSIS — I95.1 ORTHOSTATIC HYPOTENSION: ICD-10-CM

## 2021-04-01 DIAGNOSIS — C34.92 SMALL CELL CARCINOMA OF LEFT LUNG (HCC): Primary | ICD-10-CM

## 2021-04-01 PROCEDURE — 96413 CHEMO IV INFUSION 1 HR: CPT

## 2021-04-01 PROCEDURE — S0028 INJECTION, FAMOTIDINE, 20 MG: HCPCS

## 2021-04-01 PROCEDURE — 96375 TX/PRO/DX INJ NEW DRUG ADDON: CPT

## 2021-04-01 RX ORDER — HEPARIN SODIUM (PORCINE) LOCK FLUSH IV SOLN 100 UNIT/ML 100 UNIT/ML
SOLUTION INTRAVENOUS
Status: COMPLETED
Start: 2021-04-01 | End: 2021-04-01

## 2021-04-01 RX ORDER — FAMOTIDINE 10 MG/ML
INJECTION, SOLUTION INTRAVENOUS
Status: COMPLETED
Start: 2021-04-01 | End: 2021-04-01

## 2021-04-01 RX ORDER — 0.9 % SODIUM CHLORIDE 0.9 %
10 VIAL (ML) INJECTION ONCE
Status: DISCONTINUED | OUTPATIENT
Start: 2021-04-01 | End: 2021-04-01

## 2021-04-01 RX ORDER — FAMOTIDINE 10 MG/ML
20 INJECTION, SOLUTION INTRAVENOUS ONCE
Status: COMPLETED | OUTPATIENT
Start: 2021-04-01 | End: 2021-04-01

## 2021-04-01 RX ORDER — SODIUM CHLORIDE 9 MG/ML
INJECTION INTRAVENOUS
Status: DISCONTINUED
Start: 2021-04-01 | End: 2021-04-01

## 2021-04-01 RX ORDER — HEPARIN SODIUM (PORCINE) LOCK FLUSH IV SOLN 100 UNIT/ML 100 UNIT/ML
5 SOLUTION INTRAVENOUS ONCE
Status: COMPLETED | OUTPATIENT
Start: 2021-04-01 | End: 2021-04-01

## 2021-04-01 RX ORDER — 0.9 % SODIUM CHLORIDE 0.9 %
10 VIAL (ML) INJECTION ONCE
Status: CANCELLED | OUTPATIENT
Start: 2021-04-01

## 2021-04-01 RX ORDER — HEPARIN SODIUM (PORCINE) LOCK FLUSH IV SOLN 100 UNIT/ML 100 UNIT/ML
5 SOLUTION INTRAVENOUS ONCE
Status: CANCELLED | OUTPATIENT
Start: 2021-04-01

## 2021-04-01 RX ADMIN — FAMOTIDINE 20 MG: 10 INJECTION, SOLUTION INTRAVENOUS at 09:04:00

## 2021-04-01 RX ADMIN — HEPARIN SODIUM (PORCINE) LOCK FLUSH IV SOLN 100 UNIT/ML 500 UNITS: 100 SOLUTION INTRAVENOUS at 11:04:00

## 2021-04-01 NOTE — PROGRESS NOTES
Pt here for C3D3 Etoposide. Arrives Ambulating independently, accompanied by Self         Patient has only slight redness remaining  to face and upper trunk- still no itching- [address] [facility] APN aware.      Pregnancy screening: Not applicable    Modifications in

## 2021-04-05 ENCOUNTER — TELEPHONE (OUTPATIENT)
Dept: HEMATOLOGY/ONCOLOGY | Facility: HOSPITAL | Age: 61
End: 2021-04-05

## 2021-04-05 NOTE — TELEPHONE ENCOUNTER
I called Marcie Gilmore and gave him the names of urologists the [facility] said Dr Miko Epstein works with. Dr Ambreen Altamirano, Dr Nichole Bosch and Dr Ariane Park.

## 2021-04-05 NOTE — TELEPHONE ENCOUNTER
Toxicities: C3D3 Etoposide on 4/1/2021     Dysuria/Dehydration/Constipation     Dysuria: (Since starting treatment he has had an on going problem with starting his stream and then emptying his bladder. Wilfred Reed denies chills, shakes or fever.  He is voiding co

## 2021-04-05 NOTE — TELEPHONE ENCOUNTER
Nghia Llamas has taken 1 dose of miralax and 1 senokot-s. He said he has drank about 40oz of water. He also ate breakfast. He has not moved his bowels. He is voiding more.  Nghia Llamas also asked if Dr Manjeet Ovalle or Haja Landeros can give him the name of a urologist he can see

## 2021-04-06 ENCOUNTER — LAB ENCOUNTER (OUTPATIENT)
Dept: LAB | Facility: HOSPITAL | Age: 61
End: 2021-04-06
Attending: NURSE PRACTITIONER
Payer: COMMERCIAL

## 2021-04-06 ENCOUNTER — OFFICE VISIT (OUTPATIENT)
Dept: SURGERY | Facility: CLINIC | Age: 61
End: 2021-04-06
Payer: COMMERCIAL

## 2021-04-06 VITALS
HEIGHT: 60 IN | BODY MASS INDEX: 25.13 KG/M2 | SYSTOLIC BLOOD PRESSURE: 122 MMHG | HEART RATE: 82 BPM | WEIGHT: 128 LBS | DIASTOLIC BLOOD PRESSURE: 71 MMHG

## 2021-04-06 DIAGNOSIS — N35.811 OTHER URETHRAL STRICTURE, MALE, MEATAL: ICD-10-CM

## 2021-04-06 DIAGNOSIS — N40.1 BENIGN PROSTATIC HYPERPLASIA WITH LOWER URINARY TRACT SYMPTOMS, SYMPTOM DETAILS UNSPECIFIED: ICD-10-CM

## 2021-04-06 DIAGNOSIS — N40.1 BENIGN PROSTATIC HYPERPLASIA WITH LOWER URINARY TRACT SYMPTOMS, SYMPTOM DETAILS UNSPECIFIED: Primary | ICD-10-CM

## 2021-04-06 PROCEDURE — 3074F SYST BP LT 130 MM HG: CPT | Performed by: NURSE PRACTITIONER

## 2021-04-06 PROCEDURE — 99203 OFFICE O/P NEW LOW 30 MIN: CPT | Performed by: NURSE PRACTITIONER

## 2021-04-06 PROCEDURE — 3078F DIAST BP <80 MM HG: CPT | Performed by: NURSE PRACTITIONER

## 2021-04-06 PROCEDURE — 81003 URINALYSIS AUTO W/O SCOPE: CPT | Performed by: NURSE PRACTITIONER

## 2021-04-06 PROCEDURE — 36415 COLL VENOUS BLD VENIPUNCTURE: CPT

## 2021-04-06 PROCEDURE — 3008F BODY MASS INDEX DOCD: CPT | Performed by: NURSE PRACTITIONER

## 2021-04-06 NOTE — PROGRESS NOTES
HPI/Subjective:     Patient ID: Iva Tipton. is a 56year old male. HPI     Patient is a 56year old male who presents to the clinic for an initial evaluation regarding difficulty with urination.   Past medical history of small cell carcinoma o Memantine HCl 10 MG Oral Tab Take 1 tablet (10 mg total) by mouth 2 (two) times daily.  60 tablet 11   • Prochlorperazine Maleate (COMPAZINE) 10 mg tablet TAKE 1 TABLET BY MOUTH EVERY 6 HOURS AS NEEDED FOR NAUSEA AND VOMITING 150 tablet 0   • Prochlorperazi General: Normal range of motion. Cervical back: Normal range of motion. Skin:     General: Skin is warm and dry. Neurological:      General: No focal deficit present. Mental Status: He is alert and oriented to person, place, and time.    Psych

## 2021-04-08 ENCOUNTER — OFFICE VISIT (OUTPATIENT)
Dept: SURGERY | Facility: CLINIC | Age: 61
End: 2021-04-08
Payer: COMMERCIAL

## 2021-04-08 VITALS — SYSTOLIC BLOOD PRESSURE: 100 MMHG | DIASTOLIC BLOOD PRESSURE: 66 MMHG | HEART RATE: 76 BPM

## 2021-04-08 DIAGNOSIS — Z79.01 MONITORING FOR ANTICOAGULANT USE: ICD-10-CM

## 2021-04-08 DIAGNOSIS — N35.911 STRICTURE OF URETHRAL MEATUS IN MALE, UNSPECIFIED STRICTURE TYPE: Primary | ICD-10-CM

## 2021-04-08 DIAGNOSIS — Z01.818 PREOP EXAMINATION: ICD-10-CM

## 2021-04-08 DIAGNOSIS — Q54.0 BALANIC HYPOSPADIAS: ICD-10-CM

## 2021-04-08 DIAGNOSIS — Z51.81 MONITORING FOR ANTICOAGULANT USE: ICD-10-CM

## 2021-04-08 PROCEDURE — 99215 OFFICE O/P EST HI 40 MIN: CPT | Performed by: UROLOGY

## 2021-04-08 PROCEDURE — 3074F SYST BP LT 130 MM HG: CPT | Performed by: UROLOGY

## 2021-04-08 PROCEDURE — 3078F DIAST BP <80 MM HG: CPT | Performed by: UROLOGY

## 2021-04-08 NOTE — PROGRESS NOTES
Patient seen in office, scheduled cystoscopy, urethral dilation, possible meatotomy, scheduled Friday 05/28/2021, [facility]/outpatient, went over pre-op/lab instructions, all questions answered.       Patient may have to change surgery date, please

## 2021-04-08 NOTE — H&P
[facility] Urology  Initial Office Consultation    HPI:   Laurie Sandhu. is a 56year old male here today for consultation at the request of, and a copy of this note will be sent to, Verna Meier MD and GENESIS Enrique.      1. Hyposp EXAM:  /66   Pulse 76     Physical Exam  Constitutional:       General: He is not in acute distress. Appearance: He is well-developed. HENT:      Head: Normocephalic and atraumatic. Eyes:      General: No scleral icterus.   [facility] [place] benefits, risks, possible complications, and alternatives of treatment including formal hypospadias repair.      Discussed risks including not limited to anesthetic complications, bleeding, infection, recurrence of stenosis, splaying of urine, and temporary

## 2021-04-19 ENCOUNTER — NURSE ONLY (OUTPATIENT)
Dept: HEMATOLOGY/ONCOLOGY | Facility: HOSPITAL | Age: 61
End: 2021-04-19
Attending: INTERNAL MEDICINE
Payer: COMMERCIAL

## 2021-04-19 VITALS
HEART RATE: 69 BPM | SYSTOLIC BLOOD PRESSURE: 119 MMHG | TEMPERATURE: 98 F | BODY MASS INDEX: 24.15 KG/M2 | RESPIRATION RATE: 18 BRPM | WEIGHT: 123 LBS | HEIGHT: 60 IN | OXYGEN SATURATION: 100 % | DIASTOLIC BLOOD PRESSURE: 62 MMHG

## 2021-04-19 DIAGNOSIS — C34.92 SMALL CELL CARCINOMA OF LEFT LUNG (HCC): Primary | ICD-10-CM

## 2021-04-19 DIAGNOSIS — L27.0 DRUG-INDUCED SKIN RASH: ICD-10-CM

## 2021-04-19 DIAGNOSIS — C34.92 SMALL CELL CARCINOMA OF LEFT LUNG (HCC): ICD-10-CM

## 2021-04-19 DIAGNOSIS — Z45.2 ENCOUNTER FOR CARE RELATED TO VASCULAR ACCESS PORT: ICD-10-CM

## 2021-04-19 DIAGNOSIS — C79.31 BRAIN METASTASIS (HCC): ICD-10-CM

## 2021-04-19 DIAGNOSIS — Z51.11 CHEMOTHERAPY MANAGEMENT, ENCOUNTER FOR: Primary | ICD-10-CM

## 2021-04-19 DIAGNOSIS — E03.9 HYPOTHYROIDISM (ACQUIRED): ICD-10-CM

## 2021-04-19 PROCEDURE — 99215 OFFICE O/P EST HI 40 MIN: CPT | Performed by: INTERNAL MEDICINE

## 2021-04-19 PROCEDURE — 85025 COMPLETE CBC W/AUTO DIFF WBC: CPT

## 2021-04-19 PROCEDURE — 36591 DRAW BLOOD OFF VENOUS DEVICE: CPT

## 2021-04-19 PROCEDURE — 80053 COMPREHEN METABOLIC PANEL: CPT

## 2021-04-19 RX ORDER — FAMOTIDINE 10 MG/ML
20 INJECTION, SOLUTION INTRAVENOUS ONCE
Status: CANCELLED
Start: 2021-04-22 | End: 2021-04-22

## 2021-04-19 RX ORDER — FAMOTIDINE 10 MG/ML
20 INJECTION, SOLUTION INTRAVENOUS ONCE
Status: CANCELLED
Start: 2021-04-21 | End: 2021-04-21

## 2021-04-19 RX ORDER — ONDANSETRON HYDROCHLORIDE 8 MG/1
8 TABLET, FILM COATED ORAL EVERY 8 HOURS PRN
COMMUNITY
Start: 2021-02-04 | End: 2021-08-10

## 2021-04-19 RX ORDER — SODIUM CHLORIDE 9 MG/ML
INJECTION INTRAVENOUS
Status: DISCONTINUED
Start: 2021-04-19 | End: 2021-04-19

## 2021-04-19 RX ORDER — HEPARIN SODIUM (PORCINE) LOCK FLUSH IV SOLN 100 UNIT/ML 100 UNIT/ML
5 SOLUTION INTRAVENOUS ONCE
Status: CANCELLED | OUTPATIENT
Start: 2021-04-19

## 2021-04-19 RX ORDER — FAMOTIDINE 10 MG/ML
20 INJECTION, SOLUTION INTRAVENOUS ONCE
Status: CANCELLED
Start: 2021-04-20 | End: 2021-04-20

## 2021-04-19 RX ORDER — 0.9 % SODIUM CHLORIDE 0.9 %
10 VIAL (ML) INJECTION ONCE
Status: CANCELLED | OUTPATIENT
Start: 2021-04-19

## 2021-04-19 RX ORDER — HEPARIN SODIUM (PORCINE) LOCK FLUSH IV SOLN 100 UNIT/ML 100 UNIT/ML
5 SOLUTION INTRAVENOUS ONCE
Status: COMPLETED | OUTPATIENT
Start: 2021-04-19 | End: 2021-04-19

## 2021-04-19 RX ADMIN — HEPARIN SODIUM (PORCINE) LOCK FLUSH IV SOLN 100 UNIT/ML 500 UNITS: 100 SOLUTION INTRAVENOUS at 08:30:00

## 2021-04-19 NOTE — PROGRESS NOTES
[facility] Progress Note    Patient Name: Marisa Thornton. YOB: 1960   Medical Record Number: [id number]   Attending Physician: Filipe Dowilng M.D.      Chief Complaint:  Small cell lung cancer    History of Present Illness:  Cancer h of room spinning. Has chronic mild tinnitus from occupational exposure that is not changed, but no ear pain or congestion. No infectious respiratory symptoms.   Describes these minute-long episodes as a warm flushing sensation, and not necessarily worse wi every 8 (eight) hours as needed. , Disp: , Rfl:   •  Memantine HCl 10 MG Oral Tab, Take 1 tablet (10 mg total) by mouth 2 (two) times daily. , Disp: 60 tablet, Rfl: 11  •  Prochlorperazine Maleate (COMPAZINE) 10 mg tablet, TAKE 1 TABLET BY MOUTH EVERY 6 HOUR famoTIDine (PEPCID) injection 20 mg, 20 mg, Intravenous, Once, JOHANNA Yeung, 20 mg at 04/01/21 0904  [COMPLETED] etoposide (VEPESID) 160 mg in sodium chloride 0.9% 508 mL chemo-infusion, 100 mg/m2 (Treatment Plan Recorded), Intravenous, Once, Jessi Flores Recorded), Intravenous, Once, JOHANNA Chong, Stopped at 03/30/21 1155  [COMPLETED] Heparin Sodium Lock Flush 100 UNIT/ML injection 500 Units, 5 mL, Intracatheter, Once, Lilliana Gomes MD, 500 Units at 03/30/21 1205  [COMPLETED] Heparin Sodium Loc Radiology:  1.3 cm cerebellar lesion likely metastatic on brain MRI    Cancer Staging  No matching staging information was found for the patient.     Impression and Plan:  56-year-old male being evaluated by Medical Oncology for extensive stage small dermatitis:  Steroid crm prn. Stable.  Unclear if worse dermatitis (non-pruritic) with C2 with erythema to chest and face during last cycle was radiation recall dermatitis (?) vs dermatitis r/t prior immune-mediated vs atopic flare or infusion rxn to Etopos

## 2021-04-19 NOTE — PROGRESS NOTES
Seen and examined with GENESIS Washington. Agree with her documentation. Recurrent small cell lung cancer brain metastasis here for cycle #4/4 of retreatment with carboplatin etoposide following brain radiotherapy. On exam is comfortable nonlabored respirations.

## 2021-04-20 ENCOUNTER — OFFICE VISIT (OUTPATIENT)
Dept: HEMATOLOGY/ONCOLOGY | Facility: HOSPITAL | Age: 61
End: 2021-04-20
Attending: INTERNAL MEDICINE
Payer: COMMERCIAL

## 2021-04-20 VITALS
HEART RATE: 73 BPM | DIASTOLIC BLOOD PRESSURE: 54 MMHG | SYSTOLIC BLOOD PRESSURE: 105 MMHG | RESPIRATION RATE: 18 BRPM | TEMPERATURE: 98 F | OXYGEN SATURATION: 100 %

## 2021-04-20 DIAGNOSIS — C34.92 SMALL CELL CARCINOMA OF LEFT LUNG (HCC): ICD-10-CM

## 2021-04-20 DIAGNOSIS — C79.31 BRAIN METASTASES (HCC): Primary | ICD-10-CM

## 2021-04-20 PROCEDURE — S0028 INJECTION, FAMOTIDINE, 20 MG: HCPCS

## 2021-04-20 PROCEDURE — 96417 CHEMO IV INFUS EACH ADDL SEQ: CPT

## 2021-04-20 PROCEDURE — 96375 TX/PRO/DX INJ NEW DRUG ADDON: CPT

## 2021-04-20 PROCEDURE — 96413 CHEMO IV INFUSION 1 HR: CPT

## 2021-04-20 RX ORDER — FAMOTIDINE 10 MG/ML
INJECTION, SOLUTION INTRAVENOUS
Status: COMPLETED
Start: 2021-04-20 | End: 2021-04-20

## 2021-04-20 RX ORDER — SODIUM CHLORIDE 9 MG/ML
INJECTION INTRAVENOUS
Status: DISCONTINUED
Start: 2021-04-20 | End: 2021-04-20

## 2021-04-20 RX ORDER — HEPARIN SODIUM (PORCINE) LOCK FLUSH IV SOLN 100 UNIT/ML 100 UNIT/ML
SOLUTION INTRAVENOUS
Status: COMPLETED
Start: 2021-04-20 | End: 2021-04-20

## 2021-04-20 RX ORDER — FAMOTIDINE 10 MG/ML
20 INJECTION, SOLUTION INTRAVENOUS ONCE
Status: COMPLETED | OUTPATIENT
Start: 2021-04-20 | End: 2021-04-20

## 2021-04-20 RX ADMIN — FAMOTIDINE 20 MG: 10 INJECTION, SOLUTION INTRAVENOUS at 09:06:00

## 2021-04-20 RX ADMIN — HEPARIN SODIUM (PORCINE) LOCK FLUSH IV SOLN 100 UNIT/ML 500 UNITS: 100 SOLUTION INTRAVENOUS at 11:03:00

## 2021-04-20 NOTE — PATIENT INSTRUCTIONS
Schedule your CT of the chest and abdomen and MRI of brain on May 12th or 13th. You will see the doctor on May 14th.

## 2021-04-20 NOTE — PROGRESS NOTES
Pt here for C4D1 Carboplatin and Etoposide. Arrives Ambulating independently, accompanied by Self         Pt states his rash is just about gone. States he gets a little itching and redness to the back, but it subsides with the cream he is using.   He pres

## 2021-04-20 NOTE — PROGRESS NOTES
Pt here for C4D2 Etoposide. Arrives Ambulating independently, accompanied by Self. No changes in status from yesterday.     Pregnancy screening: Not applicable    Modifications in dose or schedule: Yes, Pepcid 20mg IV premed added recently for facial redn

## 2021-04-21 ENCOUNTER — OFFICE VISIT (OUTPATIENT)
Dept: HEMATOLOGY/ONCOLOGY | Facility: HOSPITAL | Age: 61
End: 2021-04-21
Attending: INTERNAL MEDICINE
Payer: COMMERCIAL

## 2021-04-21 ENCOUNTER — TELEPHONE (OUTPATIENT)
Dept: SURGERY | Facility: CLINIC | Age: 61
End: 2021-04-21

## 2021-04-21 VITALS
OXYGEN SATURATION: 100 % | TEMPERATURE: 98 F | HEART RATE: 75 BPM | SYSTOLIC BLOOD PRESSURE: 115 MMHG | DIASTOLIC BLOOD PRESSURE: 47 MMHG | RESPIRATION RATE: 18 BRPM

## 2021-04-21 DIAGNOSIS — Z45.2 ENCOUNTER FOR CARE RELATED TO VASCULAR ACCESS PORT: ICD-10-CM

## 2021-04-21 DIAGNOSIS — C79.31 BRAIN METASTASES (HCC): Primary | ICD-10-CM

## 2021-04-21 DIAGNOSIS — C34.92 SMALL CELL CARCINOMA OF LEFT LUNG (HCC): ICD-10-CM

## 2021-04-21 PROCEDURE — S0028 INJECTION, FAMOTIDINE, 20 MG: HCPCS

## 2021-04-21 PROCEDURE — 96413 CHEMO IV INFUSION 1 HR: CPT

## 2021-04-21 PROCEDURE — 96375 TX/PRO/DX INJ NEW DRUG ADDON: CPT

## 2021-04-21 RX ORDER — HEPARIN SODIUM (PORCINE) LOCK FLUSH IV SOLN 100 UNIT/ML 100 UNIT/ML
5 SOLUTION INTRAVENOUS ONCE
Status: COMPLETED | OUTPATIENT
Start: 2021-04-21 | End: 2021-04-21

## 2021-04-21 RX ORDER — HEPARIN SODIUM (PORCINE) LOCK FLUSH IV SOLN 100 UNIT/ML 100 UNIT/ML
SOLUTION INTRAVENOUS
Status: COMPLETED
Start: 2021-04-21 | End: 2021-04-21

## 2021-04-21 RX ORDER — FAMOTIDINE 10 MG/ML
INJECTION, SOLUTION INTRAVENOUS
Status: COMPLETED
Start: 2021-04-21 | End: 2021-04-21

## 2021-04-21 RX ORDER — SODIUM CHLORIDE 9 MG/ML
INJECTION INTRAVENOUS
Status: DISCONTINUED
Start: 2021-04-21 | End: 2021-04-21

## 2021-04-21 RX ORDER — 0.9 % SODIUM CHLORIDE 0.9 %
10 VIAL (ML) INJECTION ONCE
Status: CANCELLED | OUTPATIENT
Start: 2021-04-21

## 2021-04-21 RX ORDER — FAMOTIDINE 10 MG/ML
20 INJECTION, SOLUTION INTRAVENOUS ONCE
Status: COMPLETED | OUTPATIENT
Start: 2021-04-21 | End: 2021-04-21

## 2021-04-21 RX ORDER — HEPARIN SODIUM (PORCINE) LOCK FLUSH IV SOLN 100 UNIT/ML 100 UNIT/ML
5 SOLUTION INTRAVENOUS ONCE
Status: CANCELLED | OUTPATIENT
Start: 2021-04-21

## 2021-04-21 RX ADMIN — HEPARIN SODIUM (PORCINE) LOCK FLUSH IV SOLN 100 UNIT/ML 500 UNITS: 100 SOLUTION INTRAVENOUS at 10:26:00

## 2021-04-21 RX ADMIN — FAMOTIDINE 20 MG: 10 INJECTION, SOLUTION INTRAVENOUS at 09:11:00

## 2021-04-21 NOTE — PATIENT INSTRUCTIONS
Reminder: Call and schedule MRI and CT scan the second week of May, at least two days prior to MD appointment on 5/14/21. Scheduling Instructions:   To schedule a test at [facility], call Central Scheduling at [phone]

## 2021-04-21 NOTE — TELEPHONE ENCOUNTER
S/W pt and he called asking if he still needs to get the CXR if he is going to have a CT scan of the chest/abd/pelvis gerard on 5/13. I told pt that I will ask [facility] and get back to him with a response. [facility], please advise.

## 2021-04-22 ENCOUNTER — OFFICE VISIT (OUTPATIENT)
Dept: HEMATOLOGY/ONCOLOGY | Facility: HOSPITAL | Age: 61
End: 2021-04-22
Attending: INTERNAL MEDICINE
Payer: COMMERCIAL

## 2021-04-22 VITALS
TEMPERATURE: 99 F | OXYGEN SATURATION: 100 % | DIASTOLIC BLOOD PRESSURE: 54 MMHG | HEART RATE: 67 BPM | SYSTOLIC BLOOD PRESSURE: 111 MMHG | RESPIRATION RATE: 18 BRPM

## 2021-04-22 DIAGNOSIS — C34.92 SMALL CELL CARCINOMA OF LEFT LUNG (HCC): ICD-10-CM

## 2021-04-22 DIAGNOSIS — I95.1 ORTHOSTATIC HYPOTENSION: ICD-10-CM

## 2021-04-22 DIAGNOSIS — C79.31 BRAIN METASTASES (HCC): Primary | ICD-10-CM

## 2021-04-22 DIAGNOSIS — R21 RASH: ICD-10-CM

## 2021-04-22 DIAGNOSIS — Z45.2 ENCOUNTER FOR CARE RELATED TO VASCULAR ACCESS PORT: ICD-10-CM

## 2021-04-22 PROCEDURE — S0028 INJECTION, FAMOTIDINE, 20 MG: HCPCS

## 2021-04-22 PROCEDURE — 96413 CHEMO IV INFUSION 1 HR: CPT

## 2021-04-22 PROCEDURE — 96367 TX/PROPH/DG ADDL SEQ IV INF: CPT

## 2021-04-22 PROCEDURE — 96375 TX/PRO/DX INJ NEW DRUG ADDON: CPT

## 2021-04-22 RX ORDER — 0.9 % SODIUM CHLORIDE 0.9 %
10 VIAL (ML) INJECTION ONCE
Status: CANCELLED | OUTPATIENT
Start: 2021-04-22

## 2021-04-22 RX ORDER — SODIUM CHLORIDE 9 MG/ML
INJECTION INTRAVENOUS
Status: DISCONTINUED
Start: 2021-04-22 | End: 2021-04-22

## 2021-04-22 RX ORDER — HEPARIN SODIUM (PORCINE) LOCK FLUSH IV SOLN 100 UNIT/ML 100 UNIT/ML
5 SOLUTION INTRAVENOUS ONCE
Status: COMPLETED | OUTPATIENT
Start: 2021-04-22 | End: 2021-04-22

## 2021-04-22 RX ORDER — FAMOTIDINE 10 MG/ML
INJECTION, SOLUTION INTRAVENOUS
Status: COMPLETED
Start: 2021-04-22 | End: 2021-04-22

## 2021-04-22 RX ORDER — FAMOTIDINE 10 MG/ML
20 INJECTION, SOLUTION INTRAVENOUS ONCE
Status: COMPLETED | OUTPATIENT
Start: 2021-04-22 | End: 2021-04-22

## 2021-04-22 RX ORDER — HEPARIN SODIUM (PORCINE) LOCK FLUSH IV SOLN 100 UNIT/ML 100 UNIT/ML
SOLUTION INTRAVENOUS
Status: COMPLETED
Start: 2021-04-22 | End: 2021-04-22

## 2021-04-22 RX ORDER — HEPARIN SODIUM (PORCINE) LOCK FLUSH IV SOLN 100 UNIT/ML 100 UNIT/ML
5 SOLUTION INTRAVENOUS ONCE
Status: CANCELLED | OUTPATIENT
Start: 2021-04-22

## 2021-04-22 RX ADMIN — HEPARIN SODIUM (PORCINE) LOCK FLUSH IV SOLN 100 UNIT/ML 500 UNITS: 100 SOLUTION INTRAVENOUS at 10:45:00

## 2021-04-22 RX ADMIN — FAMOTIDINE 20 MG: 10 INJECTION, SOLUTION INTRAVENOUS at 08:49:00

## 2021-04-22 NOTE — PROGRESS NOTES
Pt here for C4D3 Etoposide. Arrives Ambulating independently, accompanied by Self. No changes in status from yesterday. Pregnancy screening: Not applicable    Modifications in dose or schedule: Yes, Pepcid 20mg IV premed added for facial redness.   Vargas Colón

## 2021-04-26 RX ORDER — TRIAMCINOLONE ACETONIDE 1 MG/G
1 CREAM TOPICAL 2 TIMES DAILY
Qty: 454 G | Refills: 0 | Status: SHIPPED | OUTPATIENT
Start: 2021-04-26 | End: 2021-07-26

## 2021-05-13 ENCOUNTER — HOSPITAL ENCOUNTER (OUTPATIENT)
Dept: MRI IMAGING | Facility: HOSPITAL | Age: 61
Discharge: HOME | End: 2021-05-13
Attending: INTERNAL MEDICINE
Payer: COMMERCIAL

## 2021-05-13 ENCOUNTER — HOSPITAL ENCOUNTER (OUTPATIENT)
Dept: CT IMAGING | Facility: HOSPITAL | Age: 61
Discharge: HOME | End: 2021-05-13
Attending: NURSE PRACTITIONER
Payer: COMMERCIAL

## 2021-05-13 DIAGNOSIS — C34.92 SMALL CELL CARCINOMA OF LEFT LUNG (HCC): ICD-10-CM

## 2021-05-13 DIAGNOSIS — Z51.11 CHEMOTHERAPY MANAGEMENT, ENCOUNTER FOR: ICD-10-CM

## 2021-05-13 DIAGNOSIS — C79.31 BRAIN METASTASES (HCC): ICD-10-CM

## 2021-05-13 DIAGNOSIS — C79.31 BRAIN METASTASIS (HCC): ICD-10-CM

## 2021-05-13 PROCEDURE — 70553 MRI BRAIN STEM W/O & W/DYE: CPT | Performed by: INTERNAL MEDICINE

## 2021-05-13 PROCEDURE — 74177 CT ABD & PELVIS W/CONTRAST: CPT | Performed by: NURSE PRACTITIONER

## 2021-05-13 PROCEDURE — A9575 INJ GADOTERATE MEGLUMI 0.1ML: HCPCS | Performed by: INTERNAL MEDICINE

## 2021-05-13 PROCEDURE — 71260 CT THORAX DX C+: CPT | Performed by: NURSE PRACTITIONER

## 2021-05-14 ENCOUNTER — OFFICE VISIT (OUTPATIENT)
Dept: HEMATOLOGY/ONCOLOGY | Facility: HOSPITAL | Age: 61
End: 2021-05-14
Attending: INTERNAL MEDICINE
Payer: COMMERCIAL

## 2021-05-14 VITALS
OXYGEN SATURATION: 99 % | HEART RATE: 70 BPM | TEMPERATURE: 99 F | WEIGHT: 123 LBS | SYSTOLIC BLOOD PRESSURE: 106 MMHG | BODY MASS INDEX: 24.15 KG/M2 | DIASTOLIC BLOOD PRESSURE: 56 MMHG | RESPIRATION RATE: 18 BRPM | HEIGHT: 60 IN

## 2021-05-14 DIAGNOSIS — E03.9 ACQUIRED HYPOTHYROIDISM: ICD-10-CM

## 2021-05-14 DIAGNOSIS — Z45.2 ENCOUNTER FOR CARE RELATED TO VASCULAR ACCESS PORT: ICD-10-CM

## 2021-05-14 DIAGNOSIS — C34.92 SMALL CELL CARCINOMA OF LEFT LUNG (HCC): Primary | ICD-10-CM

## 2021-05-14 DIAGNOSIS — C79.31 BRAIN METASTASIS (HCC): Primary | ICD-10-CM

## 2021-05-14 DIAGNOSIS — C79.31 BRAIN METASTASES (HCC): Primary | ICD-10-CM

## 2021-05-14 DIAGNOSIS — Z92.21 HISTORY OF CHEMOTHERAPY: ICD-10-CM

## 2021-05-14 DIAGNOSIS — C34.90 SMALL CELL LUNG CANCER (HCC): ICD-10-CM

## 2021-05-14 PROCEDURE — 80053 COMPREHEN METABOLIC PANEL: CPT

## 2021-05-14 PROCEDURE — 84481 FREE ASSAY (FT-3): CPT

## 2021-05-14 PROCEDURE — 85025 COMPLETE CBC W/AUTO DIFF WBC: CPT

## 2021-05-14 PROCEDURE — 36415 COLL VENOUS BLD VENIPUNCTURE: CPT

## 2021-05-14 PROCEDURE — 99215 OFFICE O/P EST HI 40 MIN: CPT | Performed by: INTERNAL MEDICINE

## 2021-05-14 PROCEDURE — 84439 ASSAY OF FREE THYROXINE: CPT

## 2021-05-14 PROCEDURE — 84443 ASSAY THYROID STIM HORMONE: CPT

## 2021-05-14 RX ORDER — HEPARIN SODIUM (PORCINE) LOCK FLUSH IV SOLN 100 UNIT/ML 100 UNIT/ML
5 SOLUTION INTRAVENOUS ONCE
Status: CANCELLED | OUTPATIENT
Start: 2021-05-14

## 2021-05-14 RX ORDER — HEPARIN SODIUM (PORCINE) LOCK FLUSH IV SOLN 100 UNIT/ML 100 UNIT/ML
5 SOLUTION INTRAVENOUS ONCE
Status: DISCONTINUED | OUTPATIENT
Start: 2021-05-14 | End: 2021-05-14

## 2021-05-14 RX ORDER — 0.9 % SODIUM CHLORIDE 0.9 %
10 VIAL (ML) INJECTION ONCE
Status: CANCELLED | OUTPATIENT
Start: 2021-05-14

## 2021-05-14 RX ORDER — SODIUM CHLORIDE 9 MG/ML
INJECTION INTRAVENOUS
Status: DISCONTINUED
Start: 2021-05-14 | End: 2021-05-14

## 2021-05-14 NOTE — PROGRESS NOTES
[facility] Progress Note    Patient Name: Marisa Thornton. YOB: 1960   Medical Record Number: [id number]   Attending Physician: Filipe Dowling M.D.      Chief Complaint:  Small cell lung cancer    History of Present Illness:  Cancer h • Lung cancer ([facility] 01/2020   • Shortness of breath        Past Surgical History:  No past surgical history on file. Family History:  No family history on file.     Social History:  Social History    Socioeconomic History      Marital status: Single MG tablet, Take 8 mg by mouth every 8 (eight) hours as needed. , Disp: , Rfl:   Memantine HCl 10 MG Oral Tab, Take 1 tablet (10 mg total) by mouth 2 (two) times daily. , Disp: 60 tablet, Rfl: 11  Prochlorperazine Maleate (COMPAZINE) 10 mg tablet, TAKE 1 TABL Marie Pagan, JOHANNA, Stopped at 04/21/21 1021  [COMPLETED] Heparin Sodium Lock Flush 100 UNIT/ML injection 500 Units, 5 mL, Intracatheter, Once, Cheyenne Wilson MD, 500 Units at 04/21/21 1026  [COMPLETED] famoTIDine (PEPCID) injection 20 mg, 20 mg, Intr intact/WNL  Skin:  Minimal dermatitis to back. No other skin lesions or rash.       Laboratory:  Recent Labs   Lab 05/14/21  0900   WBC 4.6   HGB 10.7*   .0   NE 3.20         Lab Results   Component Value Date     (H) 05/14/2021    BUN 14 05/1 to clinic in 3 months with repeat CT brain MRI labs        --Treatment induced hypothyroidism, continue levothyroxine monitor and titrate    Data review: Extensive.   Personal review of radiology results and interpretation above work    Bro Schroeder MD

## 2021-05-17 ENCOUNTER — TELEPHONE (OUTPATIENT)
Dept: SURGERY | Facility: CLINIC | Age: 61
End: 2021-05-17

## 2021-05-17 NOTE — TELEPHONE ENCOUNTER
Patient scheduled for [facility]. Routed to surgery scheduling.      Please advise

## 2021-05-24 ENCOUNTER — TELEPHONE (OUTPATIENT)
Dept: SURGERY | Facility: CLINIC | Age: 61
End: 2021-05-24

## 2021-05-24 NOTE — PAT NURSING NOTE
Pt refusing to be scheduled for Covid testing . Instructed to call surgeon's office . Notified Martin-Rasta from Dr. Franco Desai office .

## 2021-05-25 ENCOUNTER — HOSPITAL ENCOUNTER (OUTPATIENT)
Dept: GENERAL RADIOLOGY | Facility: HOSPITAL | Age: 61
Discharge: HOME | End: 2021-05-25
Attending: UROLOGY
Payer: COMMERCIAL

## 2021-05-25 ENCOUNTER — LAB ENCOUNTER (OUTPATIENT)
Dept: LAB | Facility: HOSPITAL | Age: 61
End: 2021-05-25
Attending: UROLOGY
Payer: COMMERCIAL

## 2021-05-25 DIAGNOSIS — N35.911 STRICTURE OF URETHRAL MEATUS IN MALE, UNSPECIFIED STRICTURE TYPE: ICD-10-CM

## 2021-05-25 DIAGNOSIS — Z01.818 PREOP TESTING: ICD-10-CM

## 2021-05-25 DIAGNOSIS — Z79.01 MONITORING FOR ANTICOAGULANT USE: ICD-10-CM

## 2021-05-25 DIAGNOSIS — Z92.21 HISTORY OF CHEMOTHERAPY: ICD-10-CM

## 2021-05-25 DIAGNOSIS — C34.92 SMALL CELL CARCINOMA OF LEFT LUNG (HCC): ICD-10-CM

## 2021-05-25 DIAGNOSIS — C79.31 BRAIN METASTASIS (HCC): ICD-10-CM

## 2021-05-25 DIAGNOSIS — Z01.818 PREOP EXAMINATION: ICD-10-CM

## 2021-05-25 DIAGNOSIS — C34.90 SMALL CELL LUNG CANCER (HCC): ICD-10-CM

## 2021-05-25 DIAGNOSIS — E03.9 ACQUIRED HYPOTHYROIDISM: ICD-10-CM

## 2021-05-25 DIAGNOSIS — Z51.81 MONITORING FOR ANTICOAGULANT USE: ICD-10-CM

## 2021-05-25 PROCEDURE — 85025 COMPLETE CBC W/AUTO DIFF WBC: CPT

## 2021-05-25 PROCEDURE — 87086 URINE CULTURE/COLONY COUNT: CPT | Performed by: UROLOGY

## 2021-05-25 PROCEDURE — 93005 ELECTROCARDIOGRAM TRACING: CPT

## 2021-05-25 PROCEDURE — 84481 FREE ASSAY (FT-3): CPT

## 2021-05-25 PROCEDURE — 84443 ASSAY THYROID STIM HORMONE: CPT

## 2021-05-25 PROCEDURE — 81001 URINALYSIS AUTO W/SCOPE: CPT

## 2021-05-25 PROCEDURE — 36415 COLL VENOUS BLD VENIPUNCTURE: CPT

## 2021-05-25 PROCEDURE — 80053 COMPREHEN METABOLIC PANEL: CPT

## 2021-05-25 PROCEDURE — 85730 THROMBOPLASTIN TIME PARTIAL: CPT

## 2021-05-25 PROCEDURE — 93010 ELECTROCARDIOGRAM REPORT: CPT | Performed by: UROLOGY

## 2021-05-25 PROCEDURE — 71046 X-RAY EXAM CHEST 2 VIEWS: CPT | Performed by: UROLOGY

## 2021-05-25 PROCEDURE — 84439 ASSAY OF FREE THYROXINE: CPT

## 2021-05-25 PROCEDURE — 85610 PROTHROMBIN TIME: CPT

## 2021-05-28 ENCOUNTER — ANESTHESIA (OUTPATIENT)
Dept: SURGERY | Facility: HOSPITAL | Age: 61
End: 2021-05-28
Payer: COMMERCIAL

## 2021-05-28 ENCOUNTER — HOSPITAL ENCOUNTER (OUTPATIENT)
Facility: HOSPITAL | Age: 61
Setting detail: HOSPITAL OUTPATIENT SURGERY
Discharge: HOME | End: 2021-05-28
Attending: UROLOGY | Admitting: UROLOGY
Payer: COMMERCIAL

## 2021-05-28 ENCOUNTER — ANESTHESIA EVENT (OUTPATIENT)
Dept: SURGERY | Facility: HOSPITAL | Age: 61
End: 2021-05-28
Payer: COMMERCIAL

## 2021-05-28 ENCOUNTER — TELEPHONE (OUTPATIENT)
Dept: SURGERY | Facility: CLINIC | Age: 61
End: 2021-05-28

## 2021-05-28 VITALS
WEIGHT: 119 LBS | HEIGHT: 60 IN | HEART RATE: 51 BPM | TEMPERATURE: 98 F | DIASTOLIC BLOOD PRESSURE: 62 MMHG | BODY MASS INDEX: 23.36 KG/M2 | RESPIRATION RATE: 18 BRPM | SYSTOLIC BLOOD PRESSURE: 131 MMHG | OXYGEN SATURATION: 100 %

## 2021-05-28 DIAGNOSIS — N35.911 STRICTURE OF URETHRAL MEATUS IN MALE, UNSPECIFIED STRICTURE TYPE: ICD-10-CM

## 2021-05-28 DIAGNOSIS — Q54.0 BALANIC HYPOSPADIAS: ICD-10-CM

## 2021-05-28 DIAGNOSIS — Z01.818 PREOP TESTING: Primary | ICD-10-CM

## 2021-05-28 PROBLEM — N35.919 URETHRAL MEATAL STENOSIS: Status: ACTIVE | Noted: 2021-05-28

## 2021-05-28 PROCEDURE — 52281 CYSTOSCOPY AND TREATMENT: CPT | Performed by: UROLOGY

## 2021-05-28 PROCEDURE — 0T7D8ZZ DILATION OF URETHRA, VIA NATURAL OR ARTIFICIAL OPENING ENDOSCOPIC: ICD-10-PCS | Performed by: UROLOGY

## 2021-05-28 PROCEDURE — 53450 REVISION OF URETHRA: CPT | Performed by: UROLOGY

## 2021-05-28 RX ORDER — HALOPERIDOL 5 MG/ML
0.25 INJECTION INTRAMUSCULAR ONCE AS NEEDED
Status: DISCONTINUED | OUTPATIENT
Start: 2021-05-28 | End: 2021-05-28

## 2021-05-28 RX ORDER — PHENAZOPYRIDINE HYDROCHLORIDE 200 MG/1
200 TABLET, FILM COATED ORAL ONCE AS NEEDED
Status: DISCONTINUED | OUTPATIENT
Start: 2021-05-28 | End: 2021-05-28

## 2021-05-28 RX ORDER — LIDOCAINE HYDROCHLORIDE 20 MG/ML
JELLY TOPICAL AS NEEDED
Status: DISCONTINUED | OUTPATIENT
Start: 2021-05-28 | End: 2021-05-28 | Stop reason: HOSPADM

## 2021-05-28 RX ORDER — MORPHINE SULFATE 10 MG/ML
6 INJECTION, SOLUTION INTRAMUSCULAR; INTRAVENOUS EVERY 10 MIN PRN
Status: DISCONTINUED | OUTPATIENT
Start: 2021-05-28 | End: 2021-05-28

## 2021-05-28 RX ORDER — ONDANSETRON 2 MG/ML
INJECTION INTRAMUSCULAR; INTRAVENOUS AS NEEDED
Status: DISCONTINUED | OUTPATIENT
Start: 2021-05-28 | End: 2021-05-28 | Stop reason: SURG

## 2021-05-28 RX ORDER — HYDROCODONE BITARTRATE AND ACETAMINOPHEN 5; 325 MG/1; MG/1
1 TABLET ORAL AS NEEDED
Status: DISCONTINUED | OUTPATIENT
Start: 2021-05-28 | End: 2021-05-28

## 2021-05-28 RX ORDER — ACETAMINOPHEN 325 MG/1
650 TABLET ORAL EVERY 4 HOURS PRN
Status: DISCONTINUED | OUTPATIENT
Start: 2021-05-28 | End: 2021-05-28

## 2021-05-28 RX ORDER — PROCHLORPERAZINE EDISYLATE 5 MG/ML
5 INJECTION INTRAMUSCULAR; INTRAVENOUS ONCE AS NEEDED
Status: DISCONTINUED | OUTPATIENT
Start: 2021-05-28 | End: 2021-05-28

## 2021-05-28 RX ORDER — SODIUM CHLORIDE, SODIUM LACTATE, POTASSIUM CHLORIDE, CALCIUM CHLORIDE 600; 310; 30; 20 MG/100ML; MG/100ML; MG/100ML; MG/100ML
INJECTION, SOLUTION INTRAVENOUS CONTINUOUS
Status: DISCONTINUED | OUTPATIENT
Start: 2021-05-28 | End: 2021-05-28

## 2021-05-28 RX ORDER — HYDROCODONE BITARTRATE AND ACETAMINOPHEN 5; 325 MG/1; MG/1
1 TABLET ORAL EVERY 4 HOURS PRN
Status: DISCONTINUED | OUTPATIENT
Start: 2021-05-28 | End: 2021-05-28

## 2021-05-28 RX ORDER — ONDANSETRON 2 MG/ML
4 INJECTION INTRAMUSCULAR; INTRAVENOUS ONCE AS NEEDED
Status: DISCONTINUED | OUTPATIENT
Start: 2021-05-28 | End: 2021-05-28

## 2021-05-28 RX ORDER — PHENAZOPYRIDINE HYDROCHLORIDE 100 MG/1
100 TABLET, FILM COATED ORAL 3 TIMES DAILY PRN
Qty: 9 TABLET | Refills: 0 | Status: SHIPPED | OUTPATIENT
Start: 2021-05-28 | End: 2021-08-04 | Stop reason: ALTCHOICE

## 2021-05-28 RX ORDER — DEXAMETHASONE SODIUM PHOSPHATE 4 MG/ML
VIAL (ML) INJECTION AS NEEDED
Status: DISCONTINUED | OUTPATIENT
Start: 2021-05-28 | End: 2021-05-28 | Stop reason: SURG

## 2021-05-28 RX ORDER — NALOXONE HYDROCHLORIDE 0.4 MG/ML
80 INJECTION, SOLUTION INTRAMUSCULAR; INTRAVENOUS; SUBCUTANEOUS AS NEEDED
Status: DISCONTINUED | OUTPATIENT
Start: 2021-05-28 | End: 2021-05-28

## 2021-05-28 RX ORDER — HYDROMORPHONE HYDROCHLORIDE 1 MG/ML
0.4 INJECTION, SOLUTION INTRAMUSCULAR; INTRAVENOUS; SUBCUTANEOUS EVERY 5 MIN PRN
Status: DISCONTINUED | OUTPATIENT
Start: 2021-05-28 | End: 2021-05-28

## 2021-05-28 RX ORDER — MORPHINE SULFATE 4 MG/ML
4 INJECTION, SOLUTION INTRAMUSCULAR; INTRAVENOUS EVERY 10 MIN PRN
Status: DISCONTINUED | OUTPATIENT
Start: 2021-05-28 | End: 2021-05-28

## 2021-05-28 RX ORDER — ACETAMINOPHEN 500 MG
1000 TABLET ORAL ONCE
Status: DISCONTINUED | OUTPATIENT
Start: 2021-05-28 | End: 2021-05-28 | Stop reason: HOSPADM

## 2021-05-28 RX ORDER — LIDOCAINE HYDROCHLORIDE 10 MG/ML
INJECTION, SOLUTION EPIDURAL; INFILTRATION; INTRACAUDAL; PERINEURAL AS NEEDED
Status: DISCONTINUED | OUTPATIENT
Start: 2021-05-28 | End: 2021-05-28 | Stop reason: SURG

## 2021-05-28 RX ORDER — CEFAZOLIN SODIUM/WATER 2 G/20 ML
2 SYRINGE (ML) INTRAVENOUS ONCE
Status: COMPLETED | OUTPATIENT
Start: 2021-05-28 | End: 2021-05-28

## 2021-05-28 RX ORDER — HYDROCODONE BITARTRATE AND ACETAMINOPHEN 5; 325 MG/1; MG/1
2 TABLET ORAL EVERY 4 HOURS PRN
Status: DISCONTINUED | OUTPATIENT
Start: 2021-05-28 | End: 2021-05-28

## 2021-05-28 RX ORDER — DIAPER,BRIEF,INFANT-TODD,DISP
EACH MISCELLANEOUS AS NEEDED
Status: DISCONTINUED | OUTPATIENT
Start: 2021-05-28 | End: 2021-05-28 | Stop reason: HOSPADM

## 2021-05-28 RX ORDER — HYDROCODONE BITARTRATE AND ACETAMINOPHEN 5; 325 MG/1; MG/1
2 TABLET ORAL AS NEEDED
Status: DISCONTINUED | OUTPATIENT
Start: 2021-05-28 | End: 2021-05-28

## 2021-05-28 RX ORDER — MORPHINE SULFATE 4 MG/ML
2 INJECTION, SOLUTION INTRAMUSCULAR; INTRAVENOUS EVERY 10 MIN PRN
Status: DISCONTINUED | OUTPATIENT
Start: 2021-05-28 | End: 2021-05-28

## 2021-05-28 RX ORDER — HYDROMORPHONE HYDROCHLORIDE 1 MG/ML
0.2 INJECTION, SOLUTION INTRAMUSCULAR; INTRAVENOUS; SUBCUTANEOUS EVERY 5 MIN PRN
Status: DISCONTINUED | OUTPATIENT
Start: 2021-05-28 | End: 2021-05-28

## 2021-05-28 RX ORDER — HYDROMORPHONE HYDROCHLORIDE 1 MG/ML
0.6 INJECTION, SOLUTION INTRAMUSCULAR; INTRAVENOUS; SUBCUTANEOUS EVERY 5 MIN PRN
Status: DISCONTINUED | OUTPATIENT
Start: 2021-05-28 | End: 2021-05-28

## 2021-05-28 RX ADMIN — DEXAMETHASONE SODIUM PHOSPHATE 4 MG: 4 MG/ML VIAL (ML) INJECTION at 08:08:00

## 2021-05-28 RX ADMIN — ONDANSETRON 4 MG: 2 INJECTION INTRAMUSCULAR; INTRAVENOUS at 08:08:00

## 2021-05-28 RX ADMIN — LIDOCAINE HYDROCHLORIDE 25 MG: 10 INJECTION, SOLUTION EPIDURAL; INFILTRATION; INTRACAUDAL; PERINEURAL at 07:40:00

## 2021-05-28 RX ADMIN — SODIUM CHLORIDE, SODIUM LACTATE, POTASSIUM CHLORIDE, CALCIUM CHLORIDE: 600; 310; 30; 20 INJECTION, SOLUTION INTRAVENOUS at 08:25:00

## 2021-05-28 RX ADMIN — CEFAZOLIN SODIUM/WATER 2 G: 2 G/20 ML SYRINGE (ML) INTRAVENOUS at 07:42:00

## 2021-05-28 NOTE — ANESTHESIA PREPROCEDURE EVALUATION
Anesthesia PreOp Note    HPI:     Quang Richards. is a 56year old male who presents for preoperative consultation requested by: Adriel Millan MD    Date of Surgery: 5/28/2021    Procedure(s):  cystoscopy, urethral dilation, possible meatotomy daily., Disp: 60 tablet, Rfl: 11, 5/27/2021 at 0800  Levothyroxine Sodium 75 MCG Oral Tab, Take 1 tablet (75 mcg total) by mouth every morning before breakfast., Disp: 90 tablet, Rfl: 1, 5/27/2021 at 0700  Prochlorperazine Maleate (COMPAZINE) 10 mg tablet, Service: Not Asked        Blood Transfusions: Not Asked        Caffeine Concern: Yes          1 cup of coffee daily        Occupational Exposure: Not Asked        Hobby Hazards: Not Asked        Sleep Concern: Not Asked        Stress Concern: Not Asked 1.12 05/25/2021       Vital Signs: Body mass index is 23.24 kg/m². height is 1.524 m (5') and weight is 54 kg (119 lb). His oral temperature is 97.6 °F (36.4 °C). His blood pressure is 120/59 and his pulse is 38 (abnormal).  His respiration is 18 and oxy

## 2021-05-28 NOTE — TELEPHONE ENCOUNTER
See today's operative report for disposition summary; Disposition: Monitor in PACU until patient meets discharge criteria. Voiding trial before discharge. Office follow-up in 2 weeks. Patient contacted.  Patient is aware that sutures will dissolve,

## 2021-05-28 NOTE — ANESTHESIA PROCEDURE NOTES
Airway  Urgency: Elective      General Information and Staff    Patient location during procedure: OR  Anesthesiologist: Alphonso Mcgee MD  Performed: anesthesiologist     Indications and Patient Condition  Indications for airway management: anesthesia

## 2021-05-28 NOTE — H&P
History & Physical Examination    Patient Name: Radha Hernandez. MRN: [id number]  CSN: [id number]  YOB: 1960    Diagnosis: urethral meatal stenosis, hypospadias, LUTS.      Present Illness: Patient is a very pleasant 56year old male wi EXTREMITIES [X] [X]    OTHER        [ x ] I have discussed the risks and benefits and alternatives with the patient/family. They understand and agree to proceed with plan of care.   [ x ] I have reviewed the History and Physical done within the last 30 d

## 2021-05-28 NOTE — OPERATIVE REPORT
[facility]   Urology Operative Note     Wannetta Plate.  Location: OR   [facility] [id number] MRN [id number]   Admission Date 5/28/2021 Operation Date 5/28/2021   Service Urology Surgeon Bee Calle MD      Primary Surgeon: Keiyr Tsang the operating room table. Appropriate monitoring devices were connected to the patient. SCD's were applied to the bilateral lower extremities for DVT prophylaxis. Successful induction of general level LMA anesthesia was achieved.  IV antibiotics were admini The bladder appeared mildly trabeculated given patient's longstanding meatal stenosis probably contributing to bladder outlet obstruction. No bladder tumors, masses, stones, or lesions noted. No bladder diverticula noted.     The bladder was drained and t

## 2021-05-28 NOTE — ANESTHESIA POSTPROCEDURE EVALUATION
Patient: Rolene Manual.     Procedure Summary     Date: 05/28/21 Room / Location: [address] [place] [address] OR 14 / [address] [place] [address] OR    Anesthesia Start: 7087 Anesthesia Stop: 0831    Procedures:       cystoscopy, urethral dilation, meatotoplasty (N/A )      MEATOTOMY (N

## 2021-06-01 ENCOUNTER — TELEPHONE (OUTPATIENT)
Dept: SURGERY | Facility: CLINIC | Age: 61
End: 2021-06-01

## 2021-06-01 NOTE — TELEPHONE ENCOUNTER
S/W pt and informed him that unfortunately there were no instructions given for bathing but that I will send [facility] this msg and also an interoffice chat msg asking and we will get back to him with a response. Tasked to [facility] please advise.      HOME INSTRUCTIONS

## 2021-06-01 NOTE — TELEPHONE ENCOUNTER
Received a response back from [facility] thru secure chat giving orders:yes, pt can shower, running water over the area and wash with soap and water but do not scrub the area and then pat dry.      I called pt to inform of [facility]'s response and orders and he verbalized

## 2021-06-08 ENCOUNTER — TELEPHONE (OUTPATIENT)
Dept: SURGERY | Facility: CLINIC | Age: 61
End: 2021-06-08

## 2021-06-08 NOTE — TELEPHONE ENCOUNTER
Routed to Dr. Derrick Messer:  Please review & advise  Thank Jamar Drivers    -S/w pt; identity verified with name & .    -On 21, pt had hospital Cysto/ dilation/ Meatoplasty.    -This am he noticed \"white bumps along the suture line ( X2 on Right side & X

## 2021-06-08 NOTE — TELEPHONE ENCOUNTER
Per pt is concerned with wound and would like be seen sooner than 6/17 post op appt. States there are a few bumps and he is concerned.  Please advise

## 2021-06-09 ENCOUNTER — OFFICE VISIT (OUTPATIENT)
Dept: SURGERY | Facility: CLINIC | Age: 61
End: 2021-06-09
Payer: COMMERCIAL

## 2021-06-09 VITALS
WEIGHT: 119 LBS | DIASTOLIC BLOOD PRESSURE: 65 MMHG | HEART RATE: 56 BPM | BODY MASS INDEX: 23 KG/M2 | SYSTOLIC BLOOD PRESSURE: 110 MMHG

## 2021-06-09 DIAGNOSIS — Q54.0 BALANIC HYPOSPADIAS: ICD-10-CM

## 2021-06-09 DIAGNOSIS — Q64.32 CONGENITAL STRICTURE OF URETHRA: Primary | ICD-10-CM

## 2021-06-09 PROCEDURE — 3078F DIAST BP <80 MM HG: CPT | Performed by: UROLOGY

## 2021-06-09 PROCEDURE — 3074F SYST BP LT 130 MM HG: CPT | Performed by: UROLOGY

## 2021-06-09 NOTE — PROGRESS NOTES
[facility] Urology  Follow-Up Visit    HPI: Willa Menchaca. is a 56year old male presents for a follow up visit. Patient was last seen on 4/8/2021.     INTERVAL HISTORY: Patient is status post urethral dilation, meatoplasty with mucosal advance strictures. Prostate nonobstructive appearing. Bladder mildly trabeculated. - 6/9/21 F/U: Urethral meatus healing well. No signs of infection. No signs of restenosis.   Easily accommodated blue timbo meatal dilator.        PAST MEDICAL HISTORY: Met 0 - 2 /HPF None Seen   Bacteria Urine    None Seen /HPF None Seen       IMAGING:  No results found.       IMPRESSION:  56year old male with a coronal hypospadias and congenital meatal stenosis, mixed lower urinary tract symptoms.     Patient also with

## 2021-06-18 DIAGNOSIS — E03.9 HYPOTHYROIDISM (ACQUIRED): ICD-10-CM

## 2021-06-21 ENCOUNTER — TELEPHONE (OUTPATIENT)
Dept: HEMATOLOGY/ONCOLOGY | Facility: HOSPITAL | Age: 61
End: 2021-06-21

## 2021-06-21 DIAGNOSIS — Z92.89 HISTORY OF IMMUNOTHERAPY: ICD-10-CM

## 2021-06-21 DIAGNOSIS — E03.9 HYPOTHYROIDISM (ACQUIRED): ICD-10-CM

## 2021-06-21 DIAGNOSIS — R79.89 ELEVATED TSH: ICD-10-CM

## 2021-06-21 DIAGNOSIS — E03.9 ACQUIRED HYPOTHYROIDISM: Primary | ICD-10-CM

## 2021-06-21 RX ORDER — LEVOTHYROXINE SODIUM 0.07 MG/1
75 TABLET ORAL
Qty: 90 TABLET | Refills: 1 | OUTPATIENT
Start: 2021-06-21

## 2021-06-21 RX ORDER — LEVOTHYROXINE SODIUM 0.05 MG/1
50 TABLET ORAL
Qty: 90 TABLET | Refills: 1 | Status: SHIPPED | OUTPATIENT
Start: 2021-06-21 | End: 2021-12-17

## 2021-06-21 RX ORDER — LEVOTHYROXINE SODIUM 0.07 MG/1
TABLET ORAL
Qty: 90 TABLET | Refills: 1 | OUTPATIENT
Start: 2021-06-21

## 2021-06-21 NOTE — TELEPHONE ENCOUNTER
Patient returned my call. Will reduce levothyroxine to 50 mcg daily. Monitor closely. Appetite is low, losing weight, occasional heart palpitations, tired. Call if s/s do not improve over the next 4 weeks. Do not wait until August 2021 appt.   Will rec

## 2021-06-21 NOTE — TELEPHONE ENCOUNTER
[facility] [address] [place] [address], [address], [phone], [phone] is requesting a refill for Levothyroxine0.075 (75 mcg) tabs,  Qty 90, Last Refill 3/18/21, Sig Take 1 tablet (75 mcg) by mouth every mor

## 2021-06-21 NOTE — TELEPHONE ENCOUNTER
LMOVM to discuss current levothyroxine dose. Last labs 5/25/21. Would consider lowering to 50 mcg if on 75 mcg currently (may have left over 50 mcg tablets). Instructed to call back to discuss.

## 2021-06-30 ENCOUNTER — OFFICE VISIT (OUTPATIENT)
Dept: SURGERY | Facility: CLINIC | Age: 61
End: 2021-06-30
Payer: COMMERCIAL

## 2021-06-30 VITALS
SYSTOLIC BLOOD PRESSURE: 95 MMHG | DIASTOLIC BLOOD PRESSURE: 61 MMHG | HEART RATE: 59 BPM | BODY MASS INDEX: 23 KG/M2 | WEIGHT: 119 LBS

## 2021-06-30 DIAGNOSIS — Q64.32 CONGENITAL STRICTURE OF URETHRA: Primary | ICD-10-CM

## 2021-06-30 DIAGNOSIS — R39.16 STRAINING TO VOID: ICD-10-CM

## 2021-06-30 DIAGNOSIS — R39.11 HESITANCY OF MICTURITION: ICD-10-CM

## 2021-06-30 PROCEDURE — 99024 POSTOP FOLLOW-UP VISIT: CPT | Performed by: UROLOGY

## 2021-06-30 PROCEDURE — 3078F DIAST BP <80 MM HG: CPT | Performed by: UROLOGY

## 2021-06-30 PROCEDURE — 3074F SYST BP LT 130 MM HG: CPT | Performed by: UROLOGY

## 2021-06-30 RX ORDER — TAMSULOSIN HYDROCHLORIDE 0.4 MG/1
0.4 CAPSULE ORAL
Qty: 90 CAPSULE | Refills: 1 | Status: SHIPPED | OUTPATIENT
Start: 2021-06-30 | End: 2021-12-27

## 2021-06-30 NOTE — PROGRESS NOTES
[address] Urology  Follow-Up Visit    HPI: Miko Vigil. is a 56year old male presents for a follow up visit. Patient was last seen on 6/9/2021. Here by himself.     INTERVAL HISTORY: Patient is status post urethral dilation, meatoplasty wi Prostate nonobstructive appearing. Bladder mildly trabeculated. - 6/9/21 F/U: Urethral meatus healing well. No signs of infection. No signs of restenosis.   Easily accommodated blue timbo meatal dilator.    - 6/30/21 F/U: rethral meatus healing well PSA Screen   Latest Ref Rng & Units <=4.00 ng/mL   4/6/2021 0.11      Component     Latest Ref Rng  4/6/2021   WBC Urine     0 - 5 /HPF 1-5   RBC Urine     0 - 2 /HPF None Seen   Bacteria Urine    None Seen /HPF None Seen       IMAGING:  No results found.

## 2021-07-01 ENCOUNTER — TELEPHONE (OUTPATIENT)
Dept: SURGERY | Facility: CLINIC | Age: 61
End: 2021-07-01

## 2021-07-01 NOTE — TELEPHONE ENCOUNTER
Called patient, identified by name and     Patient has questions in regards to tamsulosin 0.4mg capsules   Answered questions, advised to follow up as scheduled in 3 months to discuss if he will need to continue with tamsulosin  Discussed side effects:

## 2021-07-01 NOTE — TELEPHONE ENCOUNTER
Called patient, no answer   Left message to call back in regards to previous message     Will also send him message via vi[employer]ood with tamsulosin information

## 2021-07-23 ENCOUNTER — HOSPITAL ENCOUNTER (OUTPATIENT)
Dept: CT IMAGING | Facility: HOSPITAL | Age: 61
Discharge: HOME | End: 2021-07-23
Attending: INTERNAL MEDICINE
Payer: COMMERCIAL

## 2021-07-23 ENCOUNTER — HOSPITAL ENCOUNTER (OUTPATIENT)
Dept: MRI IMAGING | Facility: HOSPITAL | Age: 61
Discharge: HOME | End: 2021-07-23
Attending: INTERNAL MEDICINE
Payer: COMMERCIAL

## 2021-07-23 DIAGNOSIS — C79.31 BRAIN METASTASIS (HCC): ICD-10-CM

## 2021-07-23 DIAGNOSIS — C34.90 SMALL CELL LUNG CANCER (HCC): ICD-10-CM

## 2021-07-23 LAB — CREAT BLD-MCNC: 1.1 MG/DL

## 2021-07-23 PROCEDURE — 70553 MRI BRAIN STEM W/O & W/DYE: CPT | Performed by: INTERNAL MEDICINE

## 2021-07-23 PROCEDURE — 74177 CT ABD & PELVIS W/CONTRAST: CPT | Performed by: INTERNAL MEDICINE

## 2021-07-23 PROCEDURE — 71260 CT THORAX DX C+: CPT | Performed by: INTERNAL MEDICINE

## 2021-07-23 PROCEDURE — A9575 INJ GADOTERATE MEGLUMI 0.1ML: HCPCS | Performed by: INTERNAL MEDICINE

## 2021-07-23 PROCEDURE — 82565 ASSAY OF CREATININE: CPT

## 2021-07-26 RX ORDER — TRIAMCINOLONE ACETONIDE 1 MG/G
1 CREAM TOPICAL 2 TIMES DAILY
Qty: 454 G | Refills: 0 | Status: SHIPPED | OUTPATIENT
Start: 2021-07-26 | End: 2021-10-04

## 2021-08-03 ENCOUNTER — TELEPHONE (OUTPATIENT)
Dept: HEMATOLOGY/ONCOLOGY | Facility: HOSPITAL | Age: 61
End: 2021-08-03

## 2021-08-03 NOTE — TELEPHONE ENCOUNTER
Call to Donna Musa and advised that Dr Jamal Emanuel reviews all scans in person, not over the phone so appt scheduled 8/18 moved up to tomorrow with port flush and labs. Appreciative of call.

## 2021-08-04 ENCOUNTER — NURSE ONLY (OUTPATIENT)
Dept: HEMATOLOGY/ONCOLOGY | Facility: HOSPITAL | Age: 61
End: 2021-08-04
Attending: INTERNAL MEDICINE
Payer: COMMERCIAL

## 2021-08-04 VITALS
HEIGHT: 60 IN | RESPIRATION RATE: 16 BRPM | TEMPERATURE: 98 F | OXYGEN SATURATION: 100 % | SYSTOLIC BLOOD PRESSURE: 121 MMHG | HEART RATE: 62 BPM | DIASTOLIC BLOOD PRESSURE: 68 MMHG | BODY MASS INDEX: 22.97 KG/M2 | WEIGHT: 117 LBS

## 2021-08-04 DIAGNOSIS — Z45.2 ENCOUNTER FOR CARE RELATED TO VASCULAR ACCESS PORT: ICD-10-CM

## 2021-08-04 DIAGNOSIS — E03.9 ACQUIRED HYPOTHYROIDISM: Primary | ICD-10-CM

## 2021-08-04 DIAGNOSIS — C79.31 BRAIN METASTASES (HCC): ICD-10-CM

## 2021-08-04 DIAGNOSIS — E03.9 HYPOTHYROIDISM (ACQUIRED): ICD-10-CM

## 2021-08-04 DIAGNOSIS — C34.92 SMALL CELL CARCINOMA OF LEFT LUNG (HCC): Primary | ICD-10-CM

## 2021-08-04 LAB
ALBUMIN SERPL-MCNC: 3.7 G/DL (ref 3.4–5)
ALBUMIN/GLOB SERPL: 1.1 {RATIO} (ref 1–2)
ALP LIVER SERPL-CCNC: 71 U/L
ALT SERPL-CCNC: 28 U/L
ANION GAP SERPL CALC-SCNC: 10 MMOL/L (ref 0–18)
AST SERPL-CCNC: 14 U/L (ref 15–37)
BASOPHILS # BLD AUTO: 0.04 X10(3) UL (ref 0–0.2)
BASOPHILS NFR BLD AUTO: 0.7 %
BILIRUB SERPL-MCNC: 0.2 MG/DL (ref 0.1–2)
BUN BLD-MCNC: 22 MG/DL (ref 7–18)
BUN/CREAT SERPL: 25.3 (ref 10–20)
CALCIUM BLD-MCNC: 9.3 MG/DL (ref 8.5–10.1)
CHLORIDE SERPL-SCNC: 104 MMOL/L (ref 98–112)
CO2 SERPL-SCNC: 28 MMOL/L (ref 21–32)
CREAT BLD-MCNC: 0.87 MG/DL
DEPRECATED RDW RBC AUTO: 45.1 FL (ref 35.1–46.3)
EOSINOPHIL # BLD AUTO: 0.16 X10(3) UL (ref 0–0.7)
EOSINOPHIL NFR BLD AUTO: 2.8 %
ERYTHROCYTE [DISTWIDTH] IN BLOOD BY AUTOMATED COUNT: 13.2 % (ref 11–15)
GLOBULIN PLAS-MCNC: 3.5 G/DL (ref 2.8–4.4)
GLUCOSE BLD-MCNC: 145 MG/DL (ref 70–99)
HCT VFR BLD AUTO: 39.8 %
HGB BLD-MCNC: 12.7 G/DL
IMM GRANULOCYTES # BLD AUTO: 0.02 X10(3) UL (ref 0–1)
IMM GRANULOCYTES NFR BLD: 0.4 %
LYMPHOCYTES # BLD AUTO: 0.81 X10(3) UL (ref 1–4)
LYMPHOCYTES NFR BLD AUTO: 14.4 %
M PROTEIN MFR SERPL ELPH: 7.2 G/DL (ref 6.4–8.2)
MCH RBC QN AUTO: 29.5 PG (ref 26–34)
MCHC RBC AUTO-ENTMCNC: 31.9 G/DL (ref 31–37)
MCV RBC AUTO: 92.3 FL
MONOCYTES # BLD AUTO: 0.52 X10(3) UL (ref 0.1–1)
MONOCYTES NFR BLD AUTO: 9.2 %
NEUTROPHILS # BLD AUTO: 4.08 X10 (3) UL (ref 1.5–7.7)
NEUTROPHILS # BLD AUTO: 4.08 X10(3) UL (ref 1.5–7.7)
NEUTROPHILS NFR BLD AUTO: 72.5 %
OSMOLALITY SERPL CALC.SUM OF ELEC: 300 MOSM/KG (ref 275–295)
PLATELET # BLD AUTO: 169 10(3)UL (ref 150–450)
POTASSIUM SERPL-SCNC: 3.9 MMOL/L (ref 3.5–5.1)
RBC # BLD AUTO: 4.31 X10(6)UL
SODIUM SERPL-SCNC: 142 MMOL/L (ref 136–145)
T3 SERPL-MCNC: 69 NG/DL (ref 60–181)
T4 FREE SERPL-MCNC: 0.9 NG/DL (ref 0.8–1.7)
TSI SER-ACNC: 2.31 MIU/ML (ref 0.36–3.74)
WBC # BLD AUTO: 5.6 X10(3) UL (ref 4–11)

## 2021-08-04 PROCEDURE — 84439 ASSAY OF FREE THYROXINE: CPT

## 2021-08-04 PROCEDURE — 85025 COMPLETE CBC W/AUTO DIFF WBC: CPT

## 2021-08-04 PROCEDURE — 80053 COMPREHEN METABOLIC PANEL: CPT

## 2021-08-04 PROCEDURE — 99215 OFFICE O/P EST HI 40 MIN: CPT | Performed by: INTERNAL MEDICINE

## 2021-08-04 PROCEDURE — 36591 DRAW BLOOD OFF VENOUS DEVICE: CPT

## 2021-08-04 PROCEDURE — 84443 ASSAY THYROID STIM HORMONE: CPT

## 2021-08-04 PROCEDURE — 84480 ASSAY TRIIODOTHYRONINE (T3): CPT

## 2021-08-04 RX ORDER — 0.9 % SODIUM CHLORIDE 0.9 %
10 VIAL (ML) INJECTION ONCE
OUTPATIENT
Start: 2021-08-04

## 2021-08-04 RX ORDER — HEPARIN SODIUM (PORCINE) LOCK FLUSH IV SOLN 100 UNIT/ML 100 UNIT/ML
5 SOLUTION INTRAVENOUS ONCE
Status: COMPLETED | OUTPATIENT
Start: 2021-08-04 | End: 2021-08-04

## 2021-08-04 RX ORDER — HEPARIN SODIUM (PORCINE) LOCK FLUSH IV SOLN 100 UNIT/ML 100 UNIT/ML
5 SOLUTION INTRAVENOUS ONCE
Status: CANCELLED | OUTPATIENT
Start: 2021-08-04

## 2021-08-04 RX ORDER — SODIUM CHLORIDE 9 MG/ML
INJECTION INTRAVENOUS
Status: DISCONTINUED
Start: 2021-08-04 | End: 2021-08-04

## 2021-08-04 RX ADMIN — HEPARIN SODIUM (PORCINE) LOCK FLUSH IV SOLN 100 UNIT/ML 500 UNITS: 100 SOLUTION INTRAVENOUS at 09:30:00

## 2021-08-04 NOTE — PROGRESS NOTES
[facility] Progress Note    Patient Name: Jaquan Christian. YOB: 1960   Medical Record Number: [id number]   Attending Physician: Jordy Mendoza M.D.      Chief Complaint:  Small cell lung cancer    History of Present Illness:  Cancer h diagnostic radiation 2020   • Lung cancer ([facility] 01/2020   • Personal history of antineoplastic chemotherapy 2020   • Visual impairment     eyeglasses       Past Surgical History:  Past Surgical History:   Procedure Laterality Date   • OTHER Right 2020    P Memantine HCl 10 MG Oral Tab, Take 1 tablet (10 mg total) by mouth 2 (two) times daily. , Disp: 60 tablet, Rfl: 11  •  Prochlorperazine Maleate (COMPAZINE) 10 mg tablet, TAKE 1 TABLET BY MOUTH EVERY 6 HOURS AS NEEDED FOR NAUSEA AND VOMITING, Disp: 150 table iopamidol (ISOVUE-M) 76 % injection 100 mL, 100 mL, Intravenous, ONCE PRN, Juan Daniel Arriola MD, 80 mL at 07/23/21 1001          Allergies:  No Known Allergies     Review of Systems:  All other systems reviewed and negative x12    Vital Signs:  /68 (BP lung cancer of the left lung (T4N2). He originally had outside studies including chest x-ray and CT that showed a left lung mass in the setting of cough, hoarseness. He had further evaluation here at [facility] with a PET CT 12/23/2019.   This showed

## 2021-08-05 DIAGNOSIS — C79.31 BRAIN METASTASES (HCC): Primary | ICD-10-CM

## 2021-08-09 ENCOUNTER — TELEPHONE (OUTPATIENT)
Dept: HEMATOLOGY/ONCOLOGY | Facility: HOSPITAL | Age: 61
End: 2021-08-09

## 2021-08-09 ENCOUNTER — TELEPHONE (OUTPATIENT)
Dept: RADIATION ONCOLOGY | Facility: HOSPITAL | Age: 61
End: 2021-08-09

## 2021-08-09 NOTE — TELEPHONE ENCOUNTER
Patient is calling to see if he will be treated and he need to know if the spot on his brain is cancerous and he need a call back.

## 2021-08-09 NOTE — TELEPHONE ENCOUNTER
Patient was advised someone would call him with his thyroid medication dose. Thyroid levels reviewed and normal.  Please continue on same dose 50 mcg daily on empty stomach. He has heard from Dr Andrea Hernandez who will present his case at tumor board tomorrow.

## 2021-08-10 RX ORDER — PROCHLORPERAZINE MALEATE 10 MG
TABLET ORAL
Qty: 150 TABLET | Refills: 0 | Status: SHIPPED | OUTPATIENT
Start: 2021-08-10 | End: 2021-12-27

## 2021-08-10 RX ORDER — ONDANSETRON HYDROCHLORIDE 8 MG/1
TABLET, FILM COATED ORAL
Qty: 150 TABLET | Refills: 0 | Status: SHIPPED | OUTPATIENT
Start: 2021-08-10 | End: 2022-01-01

## 2021-08-12 ENCOUNTER — DOCUMENTATION ONLY (OUTPATIENT)
Dept: RADIATION ONCOLOGY | Facility: HOSPITAL | Age: 61
End: 2021-08-12

## 2021-08-12 NOTE — PROGRESS NOTES
Updated patient. Case discussed at neuro TB at [facility]. Indeterminate findings on recent MRI, recommend repeat MRI in 2 months. Order placed for new imaging, planned for ~ 9/23. Patient in agreement.

## 2021-08-18 ENCOUNTER — APPOINTMENT (OUTPATIENT)
Dept: HEMATOLOGY/ONCOLOGY | Facility: HOSPITAL | Age: 61
End: 2021-08-18
Attending: INTERNAL MEDICINE
Payer: COMMERCIAL

## 2021-08-27 ENCOUNTER — OFFICE VISIT (OUTPATIENT)
Dept: FAMILY MEDICINE CLINIC | Facility: CLINIC | Age: 61
End: 2021-08-27
Payer: COMMERCIAL

## 2021-08-27 VITALS
WEIGHT: 114 LBS | SYSTOLIC BLOOD PRESSURE: 104 MMHG | OXYGEN SATURATION: 98 % | BODY MASS INDEX: 22.38 KG/M2 | DIASTOLIC BLOOD PRESSURE: 60 MMHG | HEIGHT: 60 IN | HEART RATE: 71 BPM

## 2021-08-27 DIAGNOSIS — R63.4 WEIGHT LOSS: Primary | ICD-10-CM

## 2021-08-27 DIAGNOSIS — I95.9 HYPOTENSION, UNSPECIFIED HYPOTENSION TYPE: ICD-10-CM

## 2021-08-27 DIAGNOSIS — C34.92 SMALL CELL CARCINOMA OF LEFT LUNG (HCC): ICD-10-CM

## 2021-08-27 DIAGNOSIS — C79.31 BRAIN METASTASES (HCC): ICD-10-CM

## 2021-08-27 PROCEDURE — 3074F SYST BP LT 130 MM HG: CPT | Performed by: FAMILY MEDICINE

## 2021-08-27 PROCEDURE — 3078F DIAST BP <80 MM HG: CPT | Performed by: FAMILY MEDICINE

## 2021-08-27 PROCEDURE — 99204 OFFICE O/P NEW MOD 45 MIN: CPT | Performed by: FAMILY MEDICINE

## 2021-08-27 PROCEDURE — 3008F BODY MASS INDEX DOCD: CPT | Performed by: FAMILY MEDICINE

## 2021-08-27 RX ORDER — MIRTAZAPINE 7.5 MG/1
7.5 TABLET, FILM COATED ORAL NIGHTLY
Qty: 60 TABLET | Refills: 0 | Status: SHIPPED | OUTPATIENT
Start: 2021-08-27 | End: 2022-01-06

## 2021-08-27 NOTE — PROGRESS NOTES
HPI:    Patient ID: Gilmer Hall. is a 59year old male who presents to establish care and for labs, weight loss, and hypotension. HPI  Sister is a nurse and wants her to have some blood tests done.    Pt has hx of lung cancer with METS to brain Memantine HCl 10 MG Oral Tab Take 1 tablet (10 mg total) by mouth 2 (two) times daily. 60 tablet 11   • Prochlorperazine Maleate 5 MG Oral Take by mouth every 6 (six) hours as needed for Nausea.         No Known Allergies    Review of Systems   Constitution Palpations: Abdomen is soft. There is no mass. Tenderness: There is no abdominal tenderness. There is no guarding or rebound. Hernia: No hernia is present. Musculoskeletal:      Cervical back: Neck supple. Right lower leg: No edema. Referrals:  None       Jez Hernandez DO  QC#[id number]

## 2021-09-02 ENCOUNTER — TELEPHONE (OUTPATIENT)
Dept: FAMILY MEDICINE CLINIC | Facility: CLINIC | Age: 61
End: 2021-09-02

## 2021-09-02 NOTE — TELEPHONE ENCOUNTER
Spoke to pt over phone. Reassured him that we are using mirtazapine for an appetite stimulant, and not for depression. He is okay with trying the medication. Has f/u in 3 weeks.

## 2021-09-15 ENCOUNTER — TELEPHONE (OUTPATIENT)
Dept: HEMATOLOGY/ONCOLOGY | Facility: HOSPITAL | Age: 61
End: 2021-09-15

## 2021-09-15 NOTE — TELEPHONE ENCOUNTER
Patient is wondering why he needs to have another ct scan done, Dr Fawn Kincaid has ordered an MRI so does he need to do the ct scan again

## 2021-09-15 NOTE — TELEPHONE ENCOUNTER
Called Kavya Ba explained MRI is of Brain and CT is of body to monitor for changes. He verbalizes understanding.

## 2021-09-30 ENCOUNTER — OFFICE VISIT (OUTPATIENT)
Dept: SURGERY | Facility: CLINIC | Age: 61
End: 2021-09-30
Payer: COMMERCIAL

## 2021-09-30 VITALS — SYSTOLIC BLOOD PRESSURE: 93 MMHG | DIASTOLIC BLOOD PRESSURE: 62 MMHG | HEART RATE: 73 BPM

## 2021-09-30 DIAGNOSIS — Q54.0 BALANIC HYPOSPADIAS: ICD-10-CM

## 2021-09-30 DIAGNOSIS — Q64.32 CONGENITAL STRICTURE OF URETHRA: Primary | ICD-10-CM

## 2021-09-30 PROCEDURE — 3078F DIAST BP <80 MM HG: CPT | Performed by: UROLOGY

## 2021-09-30 PROCEDURE — 99213 OFFICE O/P EST LOW 20 MIN: CPT | Performed by: UROLOGY

## 2021-09-30 PROCEDURE — 3074F SYST BP LT 130 MM HG: CPT | Performed by: UROLOGY

## 2021-09-30 NOTE — PROGRESS NOTES
[facility] Urology  Follow-Up Visit    HPI: Nina Mendoza. is a 59year old male presents for a follow up visit. Patient was last seen on 6/30/21. Here by himself.     INTERVAL HISTORY: Patient is status post urethral dilation, meatoplasty wit dilation, meatoplasty with mucosal advancement, and cystoscopy. No other urethral strictures. Prostate nonobstructive appearing. Bladder mildly trabeculated. - 6/9/21 F/U: Urethral meatus healing well. No signs of infection. No signs of restenosis. normal.         Behavior: Behavior normal.       PATHOLOGY:  No results found.       LABS:    Component PSA Screen   Latest Ref Rng & Units <=4.00 ng/mL   4/6/2021 0.11      Component     Latest Ref Rng  4/6/2021   WBC Urine     0 - 5 /HPF 1-5   RBC Urine

## 2021-10-04 RX ORDER — TRIAMCINOLONE ACETONIDE 1 MG/G
1 CREAM TOPICAL 2 TIMES DAILY
Qty: 454 G | Refills: 1 | Status: SHIPPED | OUTPATIENT
Start: 2021-10-04

## 2021-10-07 ENCOUNTER — HOSPITAL ENCOUNTER (OUTPATIENT)
Dept: MRI IMAGING | Facility: HOSPITAL | Age: 61
Discharge: HOME | End: 2021-10-07
Attending: INTERNAL MEDICINE
Payer: COMMERCIAL

## 2021-10-07 ENCOUNTER — HOSPITAL ENCOUNTER (OUTPATIENT)
Dept: CT IMAGING | Facility: HOSPITAL | Age: 61
Discharge: HOME | End: 2021-10-07
Attending: INTERNAL MEDICINE
Payer: COMMERCIAL

## 2021-10-07 DIAGNOSIS — C79.31 BRAIN METASTASES (HCC): ICD-10-CM

## 2021-10-07 DIAGNOSIS — C34.92 SMALL CELL CARCINOMA OF LEFT LUNG (HCC): ICD-10-CM

## 2021-10-07 PROCEDURE — 71260 CT THORAX DX C+: CPT | Performed by: INTERNAL MEDICINE

## 2021-10-07 PROCEDURE — 82565 ASSAY OF CREATININE: CPT

## 2021-10-07 PROCEDURE — 70553 MRI BRAIN STEM W/O & W/DYE: CPT | Performed by: INTERNAL MEDICINE

## 2021-10-07 PROCEDURE — 74177 CT ABD & PELVIS W/CONTRAST: CPT | Performed by: INTERNAL MEDICINE

## 2021-10-07 PROCEDURE — A9575 INJ GADOTERATE MEGLUMI 0.1ML: HCPCS | Performed by: INTERNAL MEDICINE

## 2021-10-08 ENCOUNTER — RESEARCH ENCOUNTER (OUTPATIENT)
Dept: HEMATOLOGY/ONCOLOGY | Facility: HOSPITAL | Age: 61
End: 2021-10-08

## 2021-10-08 ENCOUNTER — OFFICE VISIT (OUTPATIENT)
Dept: HEMATOLOGY/ONCOLOGY | Facility: HOSPITAL | Age: 61
End: 2021-10-08
Attending: INTERNAL MEDICINE
Payer: COMMERCIAL

## 2021-10-08 ENCOUNTER — DOCUMENTATION ONLY (OUTPATIENT)
Dept: RADIATION ONCOLOGY | Facility: HOSPITAL | Age: 61
End: 2021-10-08

## 2021-10-08 VITALS
WEIGHT: 119 LBS | RESPIRATION RATE: 16 BRPM | TEMPERATURE: 99 F | SYSTOLIC BLOOD PRESSURE: 104 MMHG | HEART RATE: 78 BPM | BODY MASS INDEX: 23.36 KG/M2 | HEIGHT: 60 IN | OXYGEN SATURATION: 100 % | DIASTOLIC BLOOD PRESSURE: 60 MMHG

## 2021-10-08 DIAGNOSIS — C34.92 SMALL CELL CARCINOMA OF LEFT LUNG (HCC): Primary | ICD-10-CM

## 2021-10-08 DIAGNOSIS — C79.31 BRAIN METASTASES (HCC): ICD-10-CM

## 2021-10-08 DIAGNOSIS — E03.9 HYPOTHYROIDISM (ACQUIRED): ICD-10-CM

## 2021-10-08 DIAGNOSIS — Z92.89 HISTORY OF IMMUNOTHERAPY: ICD-10-CM

## 2021-10-08 DIAGNOSIS — Z00.6 CLINICAL TRIAL EXAM: ICD-10-CM

## 2021-10-08 PROCEDURE — 99215 OFFICE O/P EST HI 40 MIN: CPT | Performed by: INTERNAL MEDICINE

## 2021-10-08 NOTE — PROGRESS NOTES
STUDY:   ID # --[id number]  10 months post progression visit/  RECONSENT NOTE    Patient seen and examined by Dr Terrence Gonzalez today. Had CT scan and MRI brain done on 10/7/21. Changes on brain MRI: patient will follow up with radiation.      Patient con

## 2021-10-08 NOTE — PROGRESS NOTES
[facility] Progress Note    Patient Name: Iva Moe YOB: 1960   Medical Record Number: [id number]   Attending Physician: Jennifer Mcdonough M.D.      Chief Complaint:  Small cell lung cancer    History of Present Illness:  Cancer h 12/2020   • Exposure to medical diagnostic radiation 2020   • Lung cancer [facility]) 01/2020   • Personal history of antineoplastic chemotherapy 2020   • Visual impairment     eyeglasses       Past Surgical History:  Past Surgical History:   Procedure Lateralit before breakfast., Disp: 90 tablet, Rfl: 1  •  Memantine HCl 10 MG Oral Tab, Take 1 tablet (10 mg total) by mouth 2 (two) times daily. , Disp: 60 tablet, Rfl: 11  •  Prochlorperazine Maleate 5 MG Oral, Take by mouth every 6 (six) hours as needed for Nausea. Pulse 78   Temp 99 °F (37.2 °C) (Oral)   Resp 16   Ht 1.524 m (5')   Wt 54 kg (119 lb)   SpO2 100%   BMI 23.24 kg/m²     Physical Examination:  General: Patient is alert and oriented x 3, not in acute distress.   Psych:  Mood and affect appropriate  HEENT: on trial with an NRG  with cisplatin etoposide with radiotherapy plus or minus atezolizumab.   He completed the chemoradiotherapy portion April 2020 continued at [facility] until December 2020 when he developed a metastatic brain lesion with a 1.3 cm

## 2021-10-08 NOTE — PROGRESS NOTES
Patient s/p WBRT. Discussed MRI brain results, R cerebellar lesion is slightly larger, worrisome for progression. No other intracranial lesions. He is asymptomatic. Will discuss at [facility] tumor board, consideration of SRS. Pt agreeable.

## 2021-10-14 DIAGNOSIS — C79.31 BRAIN METASTASIS (HCC): Primary | ICD-10-CM

## 2021-10-14 NOTE — PROGRESS NOTES
Reviewed recent MRI results with radiology. R cerebellar lesion has changed about 1mm in size. Radiology feels changes could be c/w ongoing post-RT treatment change. Patient asymptomatic from neuro standpoint. Explained above to patient.   Recommend r

## 2021-12-07 ENCOUNTER — DOCUMENTATION ONLY (OUTPATIENT)
Dept: RADIATION ONCOLOGY | Facility: HOSPITAL | Age: 61
End: 2021-12-07

## 2021-12-07 ENCOUNTER — HOSPITAL ENCOUNTER (OUTPATIENT)
Dept: MRI IMAGING | Facility: HOSPITAL | Age: 61
Discharge: HOME | End: 2021-12-07
Attending: INTERNAL MEDICINE
Payer: COMMERCIAL

## 2021-12-07 DIAGNOSIS — C79.31 BRAIN METASTASIS (HCC): ICD-10-CM

## 2021-12-07 PROCEDURE — 82565 ASSAY OF CREATININE: CPT

## 2021-12-07 PROCEDURE — 70553 MRI BRAIN STEM W/O & W/DYE: CPT | Performed by: INTERNAL MEDICINE

## 2021-12-07 PROCEDURE — A9575 INJ GADOTERATE MEGLUMI 0.1ML: HCPCS | Performed by: INTERNAL MEDICINE

## 2021-12-16 ENCOUNTER — TELEPHONE (OUTPATIENT)
Dept: RADIATION ONCOLOGY | Facility: HOSPITAL | Age: 61
End: 2021-12-16

## 2021-12-16 DIAGNOSIS — E03.9 HYPOTHYROIDISM (ACQUIRED): ICD-10-CM

## 2021-12-16 DIAGNOSIS — Z92.89 HISTORY OF IMMUNOTHERAPY: ICD-10-CM

## 2021-12-16 DIAGNOSIS — R79.89 ELEVATED TSH: ICD-10-CM

## 2021-12-17 RX ORDER — LEVOTHYROXINE SODIUM 0.05 MG/1
TABLET ORAL
Qty: 90 TABLET | Refills: 1 | Status: ON HOLD | OUTPATIENT
Start: 2021-12-17 | End: 2022-01-01 | Stop reason: CLARIF

## 2021-12-23 ENCOUNTER — HOSPITAL ENCOUNTER (OUTPATIENT)
Dept: MRI IMAGING | Facility: HOSPITAL | Age: 61
Discharge: HOME | End: 2021-12-23
Attending: INTERNAL MEDICINE
Payer: COMMERCIAL

## 2021-12-23 DIAGNOSIS — C79.31 BRAIN METASTASES (HCC): ICD-10-CM

## 2021-12-23 PROCEDURE — 70553 MRI BRAIN STEM W/O & W/DYE: CPT | Performed by: INTERNAL MEDICINE

## 2021-12-23 PROCEDURE — A9575 INJ GADOTERATE MEGLUMI 0.1ML: HCPCS

## 2021-12-27 DIAGNOSIS — Q64.32 CONGENITAL STRICTURE OF URETHRA: ICD-10-CM

## 2021-12-27 DIAGNOSIS — R39.11 HESITANCY OF MICTURITION: ICD-10-CM

## 2021-12-27 DIAGNOSIS — R39.16 STRAINING TO VOID: ICD-10-CM

## 2021-12-27 RX ORDER — TAMSULOSIN HYDROCHLORIDE 0.4 MG/1
CAPSULE ORAL
Qty: 90 CAPSULE | Refills: 2 | Status: SHIPPED | OUTPATIENT
Start: 2021-12-27

## 2021-12-27 RX ORDER — PROCHLORPERAZINE MALEATE 10 MG
10 TABLET ORAL EVERY 6 HOURS PRN
Qty: 150 TABLET | Refills: 0 | Status: SHIPPED | OUTPATIENT
Start: 2021-12-27 | End: 2022-01-01

## 2021-12-27 NOTE — TELEPHONE ENCOUNTER
LOV 2 months ago PSA 0.11 RX refilled per protocol    Benign Prostatic Hypertrophy Medications      Protocol Criteria:  • Appointment scheduled in the past 12 months or in the next 2 months  • If patients is between the ages of 48 and 79 then PSA done with

## 2021-12-28 ENCOUNTER — TELEPHONE (OUTPATIENT)
Dept: RADIATION ONCOLOGY | Facility: HOSPITAL | Age: 61
End: 2021-12-28

## 2021-12-28 ENCOUNTER — DOCUMENTATION ONLY (OUTPATIENT)
Dept: RADIATION ONCOLOGY | Facility: HOSPITAL | Age: 61
End: 2021-12-28

## 2021-12-28 NOTE — TELEPHONE ENCOUNTER
Adair Squires called. He would like to speak with Dr. Lisa Alejandre regarding medication and any upcoming appointments. Please call.

## 2021-12-29 NOTE — PROGRESS NOTES
Results of MRI brain perfusion imaging discussed at [address] [facility] tumor board. Findings concerning for recurrent disease. Pt is a candidate for SRS. Our SRS masks are on backorder.   Once they arrive (expected next week), we will schedule for re-con and s

## 2021-12-30 ENCOUNTER — TELEPHONE (OUTPATIENT)
Dept: RADIATION ONCOLOGY | Facility: HOSPITAL | Age: 61
End: 2021-12-30

## 2021-12-30 NOTE — TELEPHONE ENCOUNTER
Patient is waiting to be contacted regarding the mask that is being made, there was a delay because they are waiting for materials.  Patient still has his old mask and was wondering if that could be used and he would like to discuss

## 2022-01-01 ENCOUNTER — TELEPHONE (OUTPATIENT)
Dept: RADIATION ONCOLOGY | Facility: HOSPITAL | Age: 62
End: 2022-01-01

## 2022-01-01 ENCOUNTER — TELEPHONE (OUTPATIENT)
Dept: HEMATOLOGY/ONCOLOGY | Facility: HOSPITAL | Age: 62
End: 2022-01-01

## 2022-01-01 RX ORDER — PROCHLORPERAZINE MALEATE 10 MG
10 TABLET ORAL EVERY 6 HOURS PRN
Qty: 150 TABLET | Refills: 0 | Status: SHIPPED | OUTPATIENT
Start: 2022-01-01 | End: 2022-01-01

## 2022-01-06 ENCOUNTER — OFFICE VISIT (OUTPATIENT)
Dept: RADIATION ONCOLOGY | Facility: HOSPITAL | Age: 62
End: 2022-01-06
Attending: INTERNAL MEDICINE
Payer: COMMERCIAL

## 2022-01-06 ENCOUNTER — TELEPHONE (OUTPATIENT)
Dept: HEMATOLOGY/ONCOLOGY | Facility: HOSPITAL | Age: 62
End: 2022-01-06

## 2022-01-06 VITALS
WEIGHT: 123.38 LBS | RESPIRATION RATE: 16 BRPM | DIASTOLIC BLOOD PRESSURE: 72 MMHG | BODY MASS INDEX: 24 KG/M2 | OXYGEN SATURATION: 100 % | HEART RATE: 62 BPM | TEMPERATURE: 98 F | SYSTOLIC BLOOD PRESSURE: 132 MMHG

## 2022-01-06 PROCEDURE — 99212 OFFICE O/P EST SF 10 MIN: CPT

## 2022-01-06 NOTE — PATIENT INSTRUCTIONS
A therapist will call you from [facility] to schedule your simulation for treatment. If you do not hear from someone by tomorrow afternoon, please call [address] [place] [address] at [phone].

## 2022-01-06 NOTE — TELEPHONE ENCOUNTER
Darryl Hernandez with CT scan scheduled for 1/12/22 at 300 pm with arrival at 245 pm blue parking lot to [facility] 2 hours prior to testing. Lab rescheduled to 1/13/22 at 300 pm followed by Dr Vasyl Alvarez at 330pm.  He verbalizes understanding.

## 2022-01-06 NOTE — PROGRESS NOTES
Nursing Re- Consult Note    Patient: Jese Srivastava. YOB: 1960  Age: 59year old  oTrsten Piña MD  Referring Physician: Kristen Casiano  Diagnosis:No diagnosis found.   Consult Date: 1/6/2022    His Patient:  Radiation process. Neurological Plan Of Care:    Problem:    Potential for decrease neuro status and headaches.     Problems related to:    Disease process    Interventions:  Assess neuro status  Meds per MD    Expected Outcomes:  Patient in

## 2022-01-11 ENCOUNTER — APPOINTMENT (OUTPATIENT)
Dept: HEMATOLOGY/ONCOLOGY | Facility: HOSPITAL | Age: 62
End: 2022-01-11
Attending: INTERNAL MEDICINE
Payer: COMMERCIAL

## 2022-01-12 ENCOUNTER — HOSPITAL ENCOUNTER (OUTPATIENT)
Dept: CT IMAGING | Facility: HOSPITAL | Age: 62
Discharge: HOME | End: 2022-01-12
Attending: NURSE PRACTITIONER
Payer: COMMERCIAL

## 2022-01-12 DIAGNOSIS — Z00.6 CLINICAL TRIAL EXAM: ICD-10-CM

## 2022-01-12 DIAGNOSIS — C34.92 SMALL CELL CARCINOMA OF LEFT LUNG (HCC): ICD-10-CM

## 2022-01-12 DIAGNOSIS — C79.31 BRAIN METASTASES (HCC): ICD-10-CM

## 2022-01-12 LAB — CREAT BLD-MCNC: 1.1 MG/DL

## 2022-01-12 PROCEDURE — 74177 CT ABD & PELVIS W/CONTRAST: CPT | Performed by: NURSE PRACTITIONER

## 2022-01-12 PROCEDURE — 71260 CT THORAX DX C+: CPT | Performed by: NURSE PRACTITIONER

## 2022-01-12 PROCEDURE — 82565 ASSAY OF CREATININE: CPT

## 2022-01-13 ENCOUNTER — APPOINTMENT (OUTPATIENT)
Dept: HEMATOLOGY/ONCOLOGY | Facility: HOSPITAL | Age: 62
End: 2022-01-13
Attending: INTERNAL MEDICINE
Payer: COMMERCIAL

## 2022-01-14 ENCOUNTER — HOSPITAL ENCOUNTER (OUTPATIENT)
Dept: RADIATION ONCOLOGY | Facility: HOSPITAL | Age: 62
Discharge: HOME | End: 2022-01-14
Attending: INTERNAL MEDICINE
Payer: COMMERCIAL

## 2022-01-14 PROCEDURE — 77399 UNLISTED PX MED RADJ PHYSICS: CPT | Performed by: INTERNAL MEDICINE

## 2022-01-14 PROCEDURE — 77334 RADIATION TREATMENT AID(S): CPT | Performed by: INTERNAL MEDICINE

## 2022-01-14 PROCEDURE — 77470 SPECIAL RADIATION TREATMENT: CPT | Performed by: INTERNAL MEDICINE

## 2022-01-18 DIAGNOSIS — C34.92 SMALL CELL CARCINOMA OF LEFT LUNG (HCC): Primary | ICD-10-CM

## 2022-01-18 DIAGNOSIS — E03.9 ACQUIRED HYPOTHYROIDISM: ICD-10-CM

## 2022-01-18 PROCEDURE — 77300 RADIATION THERAPY DOSE PLAN: CPT | Performed by: INTERNAL MEDICINE

## 2022-01-18 PROCEDURE — 77301 RADIOTHERAPY DOSE PLAN IMRT: CPT | Performed by: INTERNAL MEDICINE

## 2022-01-18 PROCEDURE — 77338 DESIGN MLC DEVICE FOR IMRT: CPT | Performed by: INTERNAL MEDICINE

## 2022-01-18 NOTE — CONSULTS
[facility]  Radiation Oncology New Consultation      Patient Name: Vance De Leon.    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

## 2022-01-19 ENCOUNTER — NURSE ONLY (OUTPATIENT)
Dept: HEMATOLOGY/ONCOLOGY | Facility: HOSPITAL | Age: 62
End: 2022-01-19
Attending: INTERNAL MEDICINE
Payer: COMMERCIAL

## 2022-01-19 VITALS
DIASTOLIC BLOOD PRESSURE: 67 MMHG | RESPIRATION RATE: 18 BRPM | WEIGHT: 124 LBS | HEIGHT: 60 IN | OXYGEN SATURATION: 100 % | TEMPERATURE: 98 F | BODY MASS INDEX: 24.35 KG/M2 | HEART RATE: 63 BPM | SYSTOLIC BLOOD PRESSURE: 122 MMHG

## 2022-01-19 DIAGNOSIS — C34.92 SMALL CELL CARCINOMA OF LEFT LUNG (HCC): Primary | ICD-10-CM

## 2022-01-19 DIAGNOSIS — E03.9 HYPOTHYROIDISM (ACQUIRED): ICD-10-CM

## 2022-01-19 DIAGNOSIS — C79.31 BRAIN METASTASES (HCC): ICD-10-CM

## 2022-01-19 DIAGNOSIS — C34.92 SMALL CELL CARCINOMA OF LEFT LUNG (HCC): ICD-10-CM

## 2022-01-19 DIAGNOSIS — Z45.2 ENCOUNTER FOR CARE RELATED TO VASCULAR ACCESS PORT: Primary | ICD-10-CM

## 2022-01-19 DIAGNOSIS — Z92.21 HISTORY OF CHEMOTHERAPY: ICD-10-CM

## 2022-01-19 DIAGNOSIS — E03.9 ACQUIRED HYPOTHYROIDISM: ICD-10-CM

## 2022-01-19 LAB
ALBUMIN SERPL-MCNC: 3.6 G/DL (ref 3.4–5)
ALBUMIN/GLOB SERPL: 1 {RATIO} (ref 1–2)
ALP LIVER SERPL-CCNC: 76 U/L
ALT SERPL-CCNC: 30 U/L
ANION GAP SERPL CALC-SCNC: 4 MMOL/L (ref 0–18)
AST SERPL-CCNC: 10 U/L (ref 15–37)
BASOPHILS # BLD AUTO: 0.05 X10(3) UL (ref 0–0.2)
BASOPHILS NFR BLD AUTO: 0.8 %
BILIRUB SERPL-MCNC: 0.5 MG/DL (ref 0.1–2)
BUN BLD-MCNC: 19 MG/DL (ref 7–18)
BUN/CREAT SERPL: 22.9 (ref 10–20)
CALCIUM BLD-MCNC: 9.2 MG/DL (ref 8.5–10.1)
CHLORIDE SERPL-SCNC: 104 MMOL/L (ref 98–112)
CO2 SERPL-SCNC: 32 MMOL/L (ref 21–32)
CREAT BLD-MCNC: 0.83 MG/DL
DEPRECATED RDW RBC AUTO: 44.3 FL (ref 35.1–46.3)
EOSINOPHIL # BLD AUTO: 0.24 X10(3) UL (ref 0–0.7)
EOSINOPHIL NFR BLD AUTO: 3.7 %
ERYTHROCYTE [DISTWIDTH] IN BLOOD BY AUTOMATED COUNT: 12.9 % (ref 11–15)
GLOBULIN PLAS-MCNC: 3.7 G/DL (ref 2.8–4.4)
GLUCOSE BLD-MCNC: 76 MG/DL (ref 70–99)
HCT VFR BLD AUTO: 43.2 %
HGB BLD-MCNC: 14.2 G/DL
IMM GRANULOCYTES # BLD AUTO: 0.02 X10(3) UL (ref 0–1)
IMM GRANULOCYTES NFR BLD: 0.3 %
LYMPHOCYTES # BLD AUTO: 1.1 X10(3) UL (ref 1–4)
LYMPHOCYTES NFR BLD AUTO: 17.1 %
MCH RBC QN AUTO: 30.7 PG (ref 26–34)
MCHC RBC AUTO-ENTMCNC: 32.9 G/DL (ref 31–37)
MCV RBC AUTO: 93.5 FL
MONOCYTES # BLD AUTO: 0.72 X10(3) UL (ref 0.1–1)
MONOCYTES NFR BLD AUTO: 11.2 %
NEUTROPHILS # BLD AUTO: 4.29 X10 (3) UL (ref 1.5–7.7)
NEUTROPHILS # BLD AUTO: 4.29 X10(3) UL (ref 1.5–7.7)
NEUTROPHILS NFR BLD AUTO: 66.9 %
OSMOLALITY SERPL CALC.SUM OF ELEC: 291 MOSM/KG (ref 275–295)
PLATELET # BLD AUTO: 162 10(3)UL (ref 150–450)
POTASSIUM SERPL-SCNC: 4.3 MMOL/L (ref 3.5–5.1)
PROT SERPL-MCNC: 7.3 G/DL (ref 6.4–8.2)
RBC # BLD AUTO: 4.62 X10(6)UL
SODIUM SERPL-SCNC: 140 MMOL/L (ref 136–145)
TSI SER-ACNC: 1.55 MIU/ML (ref 0.36–3.74)
WBC # BLD AUTO: 6.4 X10(3) UL (ref 4–11)

## 2022-01-19 PROCEDURE — 84443 ASSAY THYROID STIM HORMONE: CPT

## 2022-01-19 PROCEDURE — 99214 OFFICE O/P EST MOD 30 MIN: CPT | Performed by: INTERNAL MEDICINE

## 2022-01-19 PROCEDURE — 36591 DRAW BLOOD OFF VENOUS DEVICE: CPT

## 2022-01-19 PROCEDURE — 80053 COMPREHEN METABOLIC PANEL: CPT

## 2022-01-19 PROCEDURE — 85025 COMPLETE CBC W/AUTO DIFF WBC: CPT

## 2022-01-19 RX ORDER — HEPARIN SODIUM (PORCINE) LOCK FLUSH IV SOLN 100 UNIT/ML 100 UNIT/ML
5 SOLUTION INTRAVENOUS ONCE
OUTPATIENT
Start: 2022-01-19

## 2022-01-19 RX ORDER — HEPARIN SODIUM (PORCINE) LOCK FLUSH IV SOLN 100 UNIT/ML 100 UNIT/ML
5 SOLUTION INTRAVENOUS ONCE
Status: COMPLETED | OUTPATIENT
Start: 2022-01-19 | End: 2022-01-19

## 2022-01-19 RX ORDER — 0.9 % SODIUM CHLORIDE 0.9 %
10 VIAL (ML) INJECTION ONCE
OUTPATIENT
Start: 2022-01-19

## 2022-01-19 RX ADMIN — HEPARIN SODIUM (PORCINE) LOCK FLUSH IV SOLN 100 UNIT/ML 500 UNITS: 100 SOLUTION INTRAVENOUS at 10:28:00

## 2022-01-19 NOTE — PROGRESS NOTES
[facility] Progress Note    Patient Name: Lane Dickens. YOB: 1960   Medical Record Number: [id number]   Attending Physician: Maya Moses M.D.      Chief Complaint:  Small cell lung cancer    History of Present Illness:  Cancer h 12/2020   • Exposure to medical diagnostic radiation 2020   • Lung cancer [facility]) 01/2020   • Personal history of antineoplastic chemotherapy 2020   • Visual impairment     eyeglasses       Past Surgical History:  Past Surgical History:   Procedure Lateralit Memantine HCl 10 MG Oral Tab, Take 1 tablet (10 mg total) by mouth 2 (two) times daily. , Disp: 60 tablet, Rfl: 11  •  Prochlorperazine Maleate 5 MG Oral, Take by mouth every 6 (six) hours as needed for Nausea., Disp: , Rfl:     tamsulosin (FLOMAX) cap, PONCE distress. Psych:  Mood and affect appropriate  HEENT: EOMs intact. PERRL. Oropharynx is clear. Neck: No JVD. No palpable lymphadenopathy. Neck is supple.   Chest: Symmetric expansion, nonlabored breathing, CTA  Cardiovascular: RRR without murmur  Abdomen when he developed a metastatic brain lesion with a 1.3 cm cerebellar lesion  –Completed WBRT on 2/9/21 for brain metastasis ([facility]ras, 3000 cGy). This was followed by 4 cycles of carboplatin etoposide.      -- As of January 2022 no progression outside the CN

## 2022-01-20 ENCOUNTER — TELEPHONE (OUTPATIENT)
Dept: HEMATOLOGY/ONCOLOGY | Facility: HOSPITAL | Age: 62
End: 2022-01-20

## 2022-01-20 NOTE — TELEPHONE ENCOUNTER
Derm referral placed per Duffy File MD for patient's rash complaints. Need recs for any new management. Agreeable to see [facility] D[facility] team. Number provided, they will call to schedule appt.

## 2022-01-21 ENCOUNTER — APPOINTMENT (OUTPATIENT)
Dept: RADIATION ONCOLOGY | Facility: HOSPITAL | Age: 62
End: 2022-01-21
Attending: INTERNAL MEDICINE
Payer: COMMERCIAL

## 2022-01-21 ENCOUNTER — OFFICE VISIT (OUTPATIENT)
Dept: DERMATOLOGY CLINIC | Facility: CLINIC | Age: 62
End: 2022-01-21
Payer: COMMERCIAL

## 2022-01-21 DIAGNOSIS — L30.9 DERMATITIS: Primary | ICD-10-CM

## 2022-01-21 PROCEDURE — 99203 OFFICE O/P NEW LOW 30 MIN: CPT | Performed by: PHYSICIAN ASSISTANT

## 2022-01-21 RX ORDER — HYDROXYZINE HYDROCHLORIDE 10 MG/1
10 TABLET, FILM COATED ORAL NIGHTLY
Qty: 30 TABLET | Refills: 1 | Status: SHIPPED | OUTPATIENT
Start: 2022-01-21

## 2022-01-21 RX ORDER — FLUOCINONIDE 0.5 MG/G
1 OINTMENT TOPICAL 2 TIMES DAILY
Qty: 60 G | Refills: 2 | Status: SHIPPED | OUTPATIENT
Start: 2022-01-21

## 2022-01-21 NOTE — PROGRESS NOTES
HPI:    Patient ID: Geronimo Crystal. is a 59year old male. Patient presents with a itchy rash on back for that comes intermittently. Has been using OTC medications with no relief. Has been very itchy especially at night. Does take hot showers.  He Application topically 2 (two) times daily. (as needed to affected area) (Patient not taking: Reported on 1/21/2022) 454 g 1     Allergies:No Known Allergies   There were no vitals taken for this visit.   There is no height or weight on file to calculate BMI

## 2022-01-24 ENCOUNTER — HOSPITAL ENCOUNTER (OUTPATIENT)
Dept: RADIATION ONCOLOGY | Facility: HOSPITAL | Age: 62
Discharge: HOME | End: 2022-01-24
Attending: INTERNAL MEDICINE
Payer: COMMERCIAL

## 2022-01-24 PROCEDURE — 77373 STRTCTC BDY RAD THER TX DLVR: CPT | Performed by: INTERNAL MEDICINE

## 2022-01-26 ENCOUNTER — HOSPITAL ENCOUNTER (OUTPATIENT)
Dept: RADIATION ONCOLOGY | Facility: HOSPITAL | Age: 62
Discharge: HOME | End: 2022-01-26
Attending: INTERNAL MEDICINE
Payer: COMMERCIAL

## 2022-01-26 PROCEDURE — 77373 STRTCTC BDY RAD THER TX DLVR: CPT | Performed by: INTERNAL MEDICINE

## 2022-01-28 ENCOUNTER — HOSPITAL ENCOUNTER (OUTPATIENT)
Dept: RADIATION ONCOLOGY | Facility: HOSPITAL | Age: 62
Discharge: HOME | End: 2022-01-28
Attending: INTERNAL MEDICINE
Payer: COMMERCIAL

## 2022-01-28 ENCOUNTER — DOCUMENTATION ONLY (OUTPATIENT)
Dept: RADIATION ONCOLOGY | Facility: HOSPITAL | Age: 62
End: 2022-01-28

## 2022-01-28 VITALS
BODY MASS INDEX: 24 KG/M2 | SYSTOLIC BLOOD PRESSURE: 188 MMHG | TEMPERATURE: 97 F | OXYGEN SATURATION: 99 % | DIASTOLIC BLOOD PRESSURE: 83 MMHG | WEIGHT: 122.81 LBS | HEART RATE: 100 BPM | RESPIRATION RATE: 20 BRPM

## 2022-01-28 DIAGNOSIS — C79.31 BRAIN METASTASES (HCC): Primary | ICD-10-CM

## 2022-01-28 PROCEDURE — 77373 STRTCTC BDY RAD THER TX DLVR: CPT | Performed by: INTERNAL MEDICINE

## 2022-01-28 NOTE — PATIENT INSTRUCTIONS
- WE WILL CALL TO SCHEDULE YOU FOR A FOLLOW-UP WITH DR. CASIANO AFTER YOUR NEXT MRI  - CALL CENTRAL SCHEDULING AT [phone] TO SCHEDULE YOUR MRI OF THE BRAIN END OF MARCH 2022  - SIDE EFFECTS OF RADIATION WILL GRADUALLY SUBSIDE.  IT MAY TAKE 1- 2 WEEKS

## 2022-01-28 NOTE — PROGRESS NOTES
[facility] Radiation Treatment Management Note 1-5    Patient:  Geronimo Crystal.   Age:  59year old  Visit Diagnosis:  Metastatic SCLC  Primary Rad/Onc:  Dr. Luz Oliva    Site Delivered Dose (cGy) Prescribed Dose (cGy) Freeman Bolaños

## 2022-02-01 ENCOUNTER — HOSPITAL ENCOUNTER (OUTPATIENT)
Dept: RADIATION ONCOLOGY | Facility: HOSPITAL | Age: 62
Discharge: HOME | End: 2022-02-01
Attending: INTERNAL MEDICINE
Payer: COMMERCIAL

## 2022-02-23 ENCOUNTER — RESEARCH ENCOUNTER (OUTPATIENT)
Dept: HEMATOLOGY/ONCOLOGY | Facility: HOSPITAL | Age: 62
End: 2022-02-23

## 2022-04-11 NOTE — TELEPHONE ENCOUNTER
NK's pt, seen Jan 2022  \"1.  Dermatitis  -After discussion with patient, advised the following:  -Advised to continue with triamcinolone as needed  -Can use fluocinonide as well when there is severe itching  -Hydroxyzine 10 mg at night\"    Please advise No

## 2022-04-12 RX ORDER — HYDROXYZINE HYDROCHLORIDE 10 MG/1
TABLET, FILM COATED ORAL
Qty: 30 TABLET | Refills: 1 | Status: SHIPPED | OUTPATIENT
Start: 2022-04-12

## 2022-04-18 ENCOUNTER — HOSPITAL ENCOUNTER (OUTPATIENT)
Dept: CT IMAGING | Facility: HOSPITAL | Age: 62
Discharge: HOME | End: 2022-04-18
Attending: INTERNAL MEDICINE
Payer: COMMERCIAL

## 2022-04-18 DIAGNOSIS — C79.31 BRAIN METASTASES (HCC): ICD-10-CM

## 2022-04-18 DIAGNOSIS — C34.92 SMALL CELL CARCINOMA OF LEFT LUNG (HCC): ICD-10-CM

## 2022-04-18 LAB — CREAT BLD-MCNC: 0.9 MG/DL

## 2022-04-18 PROCEDURE — 74177 CT ABD & PELVIS W/CONTRAST: CPT | Performed by: INTERNAL MEDICINE

## 2022-04-18 PROCEDURE — 82565 ASSAY OF CREATININE: CPT

## 2022-04-18 PROCEDURE — 71260 CT THORAX DX C+: CPT | Performed by: INTERNAL MEDICINE

## 2022-04-20 ENCOUNTER — OFFICE VISIT (OUTPATIENT)
Dept: HEMATOLOGY/ONCOLOGY | Facility: HOSPITAL | Age: 62
End: 2022-04-20
Attending: INTERNAL MEDICINE
Payer: COMMERCIAL

## 2022-04-20 VITALS
TEMPERATURE: 99 F | HEIGHT: 60 IN | OXYGEN SATURATION: 99 % | RESPIRATION RATE: 18 BRPM | SYSTOLIC BLOOD PRESSURE: 109 MMHG | HEART RATE: 68 BPM | BODY MASS INDEX: 23.75 KG/M2 | WEIGHT: 121 LBS | DIASTOLIC BLOOD PRESSURE: 69 MMHG

## 2022-04-20 DIAGNOSIS — Z92.21 HISTORY OF CHEMOTHERAPY: ICD-10-CM

## 2022-04-20 DIAGNOSIS — E03.9 HYPOTHYROIDISM (ACQUIRED): ICD-10-CM

## 2022-04-20 DIAGNOSIS — Z45.2 ENCOUNTER FOR CARE RELATED TO VASCULAR ACCESS PORT: ICD-10-CM

## 2022-04-20 DIAGNOSIS — C79.31 BRAIN METASTASES (HCC): ICD-10-CM

## 2022-04-20 DIAGNOSIS — C34.92 SMALL CELL CARCINOMA OF LEFT LUNG (HCC): Primary | ICD-10-CM

## 2022-04-20 LAB
ALBUMIN SERPL-MCNC: 3.6 G/DL (ref 3.4–5)
ALBUMIN/GLOB SERPL: 1.1 {RATIO} (ref 1–2)
ALP LIVER SERPL-CCNC: 75 U/L
ALT SERPL-CCNC: 27 U/L
ANION GAP SERPL CALC-SCNC: 5 MMOL/L (ref 0–18)
AST SERPL-CCNC: 13 U/L (ref 15–37)
BASOPHILS # BLD AUTO: 0.07 X10(3) UL (ref 0–0.2)
BASOPHILS NFR BLD AUTO: 1.2 %
BILIRUB SERPL-MCNC: 0.5 MG/DL (ref 0.1–2)
BUN BLD-MCNC: 18 MG/DL (ref 7–18)
BUN/CREAT SERPL: 21.4 (ref 10–20)
CALCIUM BLD-MCNC: 9 MG/DL (ref 8.5–10.1)
CHLORIDE SERPL-SCNC: 106 MMOL/L (ref 98–112)
CO2 SERPL-SCNC: 31 MMOL/L (ref 21–32)
CREAT BLD-MCNC: 0.84 MG/DL
DEPRECATED RDW RBC AUTO: 44.7 FL (ref 35.1–46.3)
EOSINOPHIL # BLD AUTO: 0.15 X10(3) UL (ref 0–0.7)
EOSINOPHIL NFR BLD AUTO: 2.5 %
ERYTHROCYTE [DISTWIDTH] IN BLOOD BY AUTOMATED COUNT: 12.9 % (ref 11–15)
GLOBULIN PLAS-MCNC: 3.4 G/DL (ref 2.8–4.4)
GLUCOSE BLD-MCNC: 101 MG/DL (ref 70–99)
HCT VFR BLD AUTO: 42.8 %
HGB BLD-MCNC: 14 G/DL
IMM GRANULOCYTES # BLD AUTO: 0.03 X10(3) UL (ref 0–1)
IMM GRANULOCYTES NFR BLD: 0.5 %
LYMPHOCYTES # BLD AUTO: 0.93 X10(3) UL (ref 1–4)
LYMPHOCYTES NFR BLD AUTO: 15.5 %
MCH RBC QN AUTO: 30.8 PG (ref 26–34)
MCHC RBC AUTO-ENTMCNC: 32.7 G/DL (ref 31–37)
MCV RBC AUTO: 94.3 FL
MONOCYTES # BLD AUTO: 0.56 X10(3) UL (ref 0.1–1)
MONOCYTES NFR BLD AUTO: 9.3 %
NEUTROPHILS # BLD AUTO: 4.27 X10 (3) UL (ref 1.5–7.7)
NEUTROPHILS # BLD AUTO: 4.27 X10(3) UL (ref 1.5–7.7)
NEUTROPHILS NFR BLD AUTO: 71 %
OSMOLALITY SERPL CALC.SUM OF ELEC: 296 MOSM/KG (ref 275–295)
PLATELET # BLD AUTO: 186 10(3)UL (ref 150–450)
POTASSIUM SERPL-SCNC: 3.9 MMOL/L (ref 3.5–5.1)
PROT SERPL-MCNC: 7 G/DL (ref 6.4–8.2)
RBC # BLD AUTO: 4.54 X10(6)UL
SODIUM SERPL-SCNC: 142 MMOL/L (ref 136–145)
TSI SER-ACNC: 1.86 MIU/ML (ref 0.36–3.74)
WBC # BLD AUTO: 6 X10(3) UL (ref 4–11)

## 2022-04-20 PROCEDURE — 84443 ASSAY THYROID STIM HORMONE: CPT

## 2022-04-20 PROCEDURE — 36591 DRAW BLOOD OFF VENOUS DEVICE: CPT

## 2022-04-20 PROCEDURE — 80053 COMPREHEN METABOLIC PANEL: CPT

## 2022-04-20 PROCEDURE — 99214 OFFICE O/P EST MOD 30 MIN: CPT | Performed by: INTERNAL MEDICINE

## 2022-04-20 PROCEDURE — 85025 COMPLETE CBC W/AUTO DIFF WBC: CPT

## 2022-04-20 RX ORDER — HEPARIN SODIUM (PORCINE) LOCK FLUSH IV SOLN 100 UNIT/ML 100 UNIT/ML
5 SOLUTION INTRAVENOUS ONCE
Status: COMPLETED | OUTPATIENT
Start: 2022-04-20 | End: 2022-04-20

## 2022-04-20 RX ORDER — HEPARIN SODIUM (PORCINE) LOCK FLUSH IV SOLN 100 UNIT/ML 100 UNIT/ML
5 SOLUTION INTRAVENOUS ONCE
OUTPATIENT
Start: 2022-04-20

## 2022-04-20 RX ORDER — 0.9 % SODIUM CHLORIDE 0.9 %
10 VIAL (ML) INJECTION ONCE
OUTPATIENT
Start: 2022-04-20

## 2022-04-20 RX ADMIN — HEPARIN SODIUM (PORCINE) LOCK FLUSH IV SOLN 100 UNIT/ML 500 UNITS: 100 SOLUTION INTRAVENOUS at 09:43:00

## 2022-04-22 ENCOUNTER — HOSPITAL ENCOUNTER (OUTPATIENT)
Dept: MRI IMAGING | Facility: HOSPITAL | Age: 62
Discharge: HOME | End: 2022-04-22
Attending: INTERNAL MEDICINE
Payer: COMMERCIAL

## 2022-04-22 DIAGNOSIS — C79.31 BRAIN METASTASES (HCC): ICD-10-CM

## 2022-04-22 PROCEDURE — 70553 MRI BRAIN STEM W/O & W/DYE: CPT | Performed by: INTERNAL MEDICINE

## 2022-04-22 PROCEDURE — A9575 INJ GADOTERATE MEGLUMI 0.1ML: HCPCS | Performed by: INTERNAL MEDICINE

## 2022-04-27 ENCOUNTER — OFFICE VISIT (OUTPATIENT)
Dept: RADIATION ONCOLOGY | Facility: HOSPITAL | Age: 62
End: 2022-04-27
Attending: INTERNAL MEDICINE
Payer: COMMERCIAL

## 2022-04-27 VITALS
RESPIRATION RATE: 16 BRPM | OXYGEN SATURATION: 100 % | WEIGHT: 125 LBS | BODY MASS INDEX: 24 KG/M2 | DIASTOLIC BLOOD PRESSURE: 45 MMHG | HEART RATE: 63 BPM | SYSTOLIC BLOOD PRESSURE: 106 MMHG | TEMPERATURE: 97 F

## 2022-04-27 DIAGNOSIS — C79.31 BRAIN METASTASIS (HCC): Primary | ICD-10-CM

## 2022-04-27 PROCEDURE — 99211 OFF/OP EST MAY X REQ PHY/QHP: CPT

## 2022-04-27 NOTE — PROGRESS NOTES
Nursing Follow-Up Note    Patient: Kassie Winslow. YOB: 1960  Age: 59year old  Radiation Oncologist: Dr. Harry Gee  Referring Physician: Soto Maldonado  Chief Complaint: Patient presents with:  Radiation    Date: 4/27/2022    Toxicities: Denies headaches  Denies vision changes  Steady gate noted  Mild fatigue per patient    Vital Signs: There were no vitals taken for this visit., Wt Readings from Last 6 Encounters:  04/20/22 : 54.9 kg (121 lb)  01/28/22 : 55.7 kg (122 lb 12.8 oz)  01/19/22 : 56.2 kg (124 lb)  01/06/22 : 56 kg (123 lb 6.4 oz)  10/08/21 : 54 kg (119 lb)  08/27/21 : 51.7 kg (114 lb)      Allergies:  No Known Allergies    Nursing Note: Pt seen for follow up with Dr. Mele Pope. Pt completed SRS to R cerebellum on 1/28/22. Pt denies any issues, mild fatigue. VSS, pt denies pain today. Brain MRI completed on 4/22/22, Dr. Mele Pope to review with patient. Pt scheduled for Dr. Kyaw Nieves appointment in August, with a chest CT at that time. Pt to follow up with Dr. Mele Pope again in late June, early July 2022 with another brain MRI complete before appointment. Dr. Mele Pope told patient that if he is feeling well and wants to push off follow up appointment a few weeks, that is okay. Pt given AVS with instructions, states his understanding of plan.

## 2022-04-27 NOTE — PATIENT INSTRUCTIONS
Follow up with Dr. Jeanna Ngo in late June, early July 2022. Teodoro Oliva will call you to schedule your follow up appointment. Please call [phone] with any radiation questions. Schedule your MRI prior to your follow up. Call [phone] to schedule.

## 2022-05-26 ENCOUNTER — OFFICE VISIT (OUTPATIENT)
Dept: FAMILY MEDICINE CLINIC | Facility: CLINIC | Age: 62
End: 2022-05-26
Payer: COMMERCIAL

## 2022-05-26 ENCOUNTER — HOSPITAL ENCOUNTER (OUTPATIENT)
Dept: GENERAL RADIOLOGY | Age: 62
Discharge: HOME | End: 2022-05-26
Attending: FAMILY MEDICINE
Payer: COMMERCIAL

## 2022-05-26 VITALS
HEIGHT: 60 IN | HEART RATE: 63 BPM | BODY MASS INDEX: 24.54 KG/M2 | DIASTOLIC BLOOD PRESSURE: 72 MMHG | WEIGHT: 125 LBS | OXYGEN SATURATION: 97 % | SYSTOLIC BLOOD PRESSURE: 118 MMHG

## 2022-05-26 DIAGNOSIS — M79.645 PAIN OF FINGER OF LEFT HAND: Primary | ICD-10-CM

## 2022-05-26 DIAGNOSIS — M79.645 PAIN OF FINGER OF LEFT HAND: ICD-10-CM

## 2022-05-26 PROCEDURE — 73140 X-RAY EXAM OF FINGER(S): CPT | Performed by: FAMILY MEDICINE

## 2022-05-26 PROCEDURE — 3008F BODY MASS INDEX DOCD: CPT | Performed by: FAMILY MEDICINE

## 2022-05-26 PROCEDURE — 99213 OFFICE O/P EST LOW 20 MIN: CPT | Performed by: FAMILY MEDICINE

## 2022-05-26 PROCEDURE — 3074F SYST BP LT 130 MM HG: CPT | Performed by: FAMILY MEDICINE

## 2022-05-26 PROCEDURE — 3078F DIAST BP <80 MM HG: CPT | Performed by: FAMILY MEDICINE

## 2022-06-27 ENCOUNTER — HOSPITAL ENCOUNTER (OUTPATIENT)
Dept: MRI IMAGING | Facility: HOSPITAL | Age: 62
Discharge: HOME | End: 2022-06-27
Attending: INTERNAL MEDICINE
Payer: COMMERCIAL

## 2022-06-27 DIAGNOSIS — C79.31 BRAIN METASTASIS (HCC): ICD-10-CM

## 2022-06-27 LAB — CREAT BLD-MCNC: 0.9 MG/DL

## 2022-06-27 PROCEDURE — A9575 INJ GADOTERATE MEGLUMI 0.1ML: HCPCS | Performed by: INTERNAL MEDICINE

## 2022-06-27 PROCEDURE — 82565 ASSAY OF CREATININE: CPT

## 2022-06-27 PROCEDURE — 70553 MRI BRAIN STEM W/O & W/DYE: CPT | Performed by: INTERNAL MEDICINE

## 2022-06-29 ENCOUNTER — OFFICE VISIT (OUTPATIENT)
Dept: RADIATION ONCOLOGY | Facility: HOSPITAL | Age: 62
End: 2022-06-29
Attending: INTERNAL MEDICINE
Payer: COMMERCIAL

## 2022-06-29 VITALS
DIASTOLIC BLOOD PRESSURE: 68 MMHG | BODY MASS INDEX: 24 KG/M2 | HEART RATE: 69 BPM | OXYGEN SATURATION: 97 % | WEIGHT: 121.81 LBS | RESPIRATION RATE: 16 BRPM | SYSTOLIC BLOOD PRESSURE: 83 MMHG | TEMPERATURE: 98 F

## 2022-06-29 DIAGNOSIS — C79.31 BRAIN METASTASIS (HCC): Primary | ICD-10-CM

## 2022-06-29 PROCEDURE — 99211 OFF/OP EST MAY X REQ PHY/QHP: CPT

## 2022-06-29 RX ORDER — DEXAMETHASONE 2 MG/1
TABLET ORAL
Qty: 40 TABLET | Refills: 0 | Status: SHIPPED | OUTPATIENT
Start: 2022-06-29

## 2022-06-29 RX ORDER — PANTOPRAZOLE SODIUM 40 MG/1
40 TABLET, DELAYED RELEASE ORAL DAILY
Qty: 30 TABLET | Refills: 0 | Status: SHIPPED | OUTPATIENT
Start: 2022-06-29

## 2022-06-29 NOTE — PATIENT INSTRUCTIONS
Steroid instructions:    Dexamethasone 4mg once daily x7 days    Dexamethasone 3mg once daily x7 days    Dexamethasone 2mg once daily x7 days    Dexamethasone 1mg once daily x7 days    Pantoprazole daily while on dexamethasone. MRI with perfusion in 6 weeks. Dr. Silver Browne after MRI. Call to schedule MRI at [phone].

## 2022-08-11 ENCOUNTER — TELEPHONE (OUTPATIENT)
Dept: RADIATION ONCOLOGY | Facility: HOSPITAL | Age: 62
End: 2022-08-11

## 2022-08-17 ENCOUNTER — APPOINTMENT (OUTPATIENT)
Dept: RADIATION ONCOLOGY | Facility: HOSPITAL | Age: 62
End: 2022-08-17
Attending: INTERNAL MEDICINE
Payer: COMMERCIAL

## 2022-08-22 ENCOUNTER — HOSPITAL ENCOUNTER (OUTPATIENT)
Dept: CT IMAGING | Facility: HOSPITAL | Age: 62
Discharge: HOME | End: 2022-08-22
Attending: INTERNAL MEDICINE
Payer: COMMERCIAL

## 2022-08-22 DIAGNOSIS — C79.31 BRAIN METASTASES (HCC): ICD-10-CM

## 2022-08-22 DIAGNOSIS — C34.92 SMALL CELL CARCINOMA OF LEFT LUNG (HCC): ICD-10-CM

## 2022-08-22 LAB
CREAT BLD-MCNC: 0.9 MG/DL
GFR SERPLBLD BASED ON 1.73 SQ M-ARVRAT: 95 ML/MIN/1.73M2 (ref 60–?)

## 2022-08-22 PROCEDURE — 82565 ASSAY OF CREATININE: CPT

## 2022-08-22 PROCEDURE — 74177 CT ABD & PELVIS W/CONTRAST: CPT | Performed by: INTERNAL MEDICINE

## 2022-08-22 PROCEDURE — 71260 CT THORAX DX C+: CPT | Performed by: INTERNAL MEDICINE

## 2022-08-24 ENCOUNTER — APPOINTMENT (OUTPATIENT)
Dept: HEMATOLOGY/ONCOLOGY | Facility: HOSPITAL | Age: 62
End: 2022-08-24
Attending: INTERNAL MEDICINE
Payer: COMMERCIAL

## 2022-08-24 VITALS
BODY MASS INDEX: 23.32 KG/M2 | WEIGHT: 118.81 LBS | HEART RATE: 74 BPM | RESPIRATION RATE: 16 BRPM | DIASTOLIC BLOOD PRESSURE: 78 MMHG | HEIGHT: 60 IN | TEMPERATURE: 99 F | OXYGEN SATURATION: 99 % | SYSTOLIC BLOOD PRESSURE: 129 MMHG

## 2022-08-24 DIAGNOSIS — Z92.21 HISTORY OF CHEMOTHERAPY: ICD-10-CM

## 2022-08-24 DIAGNOSIS — C79.31 BRAIN METASTASES (HCC): ICD-10-CM

## 2022-08-24 DIAGNOSIS — E03.9 HYPOTHYROIDISM (ACQUIRED): ICD-10-CM

## 2022-08-24 DIAGNOSIS — C34.92 SMALL CELL CARCINOMA OF LEFT LUNG (HCC): ICD-10-CM

## 2022-08-24 DIAGNOSIS — C34.92 SMALL CELL CARCINOMA OF LEFT LUNG (HCC): Primary | ICD-10-CM

## 2022-08-24 LAB
ALBUMIN SERPL-MCNC: 3.5 G/DL (ref 3.4–5)
ALBUMIN/GLOB SERPL: 1.1 {RATIO} (ref 1–2)
ALP LIVER SERPL-CCNC: 56 U/L
ALT SERPL-CCNC: 23 U/L
ANION GAP SERPL CALC-SCNC: 7 MMOL/L (ref 0–18)
AST SERPL-CCNC: 9 U/L (ref 15–37)
BASOPHILS # BLD AUTO: 0.05 X10(3) UL (ref 0–0.2)
BASOPHILS NFR BLD AUTO: 0.7 %
BILIRUB SERPL-MCNC: 0.5 MG/DL (ref 0.1–2)
BUN BLD-MCNC: 14 MG/DL (ref 7–18)
BUN/CREAT SERPL: 14.9 (ref 10–20)
CALCIUM BLD-MCNC: 8.7 MG/DL (ref 8.5–10.1)
CHLORIDE SERPL-SCNC: 104 MMOL/L (ref 98–112)
CO2 SERPL-SCNC: 30 MMOL/L (ref 21–32)
CREAT BLD-MCNC: 0.94 MG/DL
DEPRECATED RDW RBC AUTO: 46.6 FL (ref 35.1–46.3)
EOSINOPHIL # BLD AUTO: 0.12 X10(3) UL (ref 0–0.7)
EOSINOPHIL NFR BLD AUTO: 1.6 %
ERYTHROCYTE [DISTWIDTH] IN BLOOD BY AUTOMATED COUNT: 13.2 % (ref 11–15)
GFR SERPLBLD BASED ON 1.73 SQ M-ARVRAT: 91 ML/MIN/1.73M2 (ref 60–?)
GLOBULIN PLAS-MCNC: 3.3 G/DL (ref 2.8–4.4)
GLUCOSE BLD-MCNC: 148 MG/DL (ref 70–99)
HCT VFR BLD AUTO: 40.8 %
HGB BLD-MCNC: 13.5 G/DL
IMM GRANULOCYTES # BLD AUTO: 0.04 X10(3) UL (ref 0–1)
IMM GRANULOCYTES NFR BLD: 0.5 %
LYMPHOCYTES # BLD AUTO: 1 X10(3) UL (ref 1–4)
LYMPHOCYTES NFR BLD AUTO: 13.5 %
MCH RBC QN AUTO: 31.3 PG (ref 26–34)
MCHC RBC AUTO-ENTMCNC: 33.1 G/DL (ref 31–37)
MCV RBC AUTO: 94.7 FL
MONOCYTES # BLD AUTO: 0.6 X10(3) UL (ref 0.1–1)
MONOCYTES NFR BLD AUTO: 8.1 %
NEUTROPHILS # BLD AUTO: 5.59 X10 (3) UL (ref 1.5–7.7)
NEUTROPHILS # BLD AUTO: 5.59 X10(3) UL (ref 1.5–7.7)
NEUTROPHILS NFR BLD AUTO: 75.6 %
OSMOLALITY SERPL CALC.SUM OF ELEC: 295 MOSM/KG (ref 275–295)
PLATELET # BLD AUTO: 193 10(3)UL (ref 150–450)
POTASSIUM SERPL-SCNC: 3.8 MMOL/L (ref 3.5–5.1)
PROT SERPL-MCNC: 6.8 G/DL (ref 6.4–8.2)
RBC # BLD AUTO: 4.31 X10(6)UL
SODIUM SERPL-SCNC: 141 MMOL/L (ref 136–145)
TSI SER-ACNC: 0.72 MIU/ML (ref 0.36–3.74)
WBC # BLD AUTO: 7.4 X10(3) UL (ref 4–11)

## 2022-08-24 PROCEDURE — 85025 COMPLETE CBC W/AUTO DIFF WBC: CPT

## 2022-08-24 PROCEDURE — 84443 ASSAY THYROID STIM HORMONE: CPT

## 2022-08-24 PROCEDURE — 99214 OFFICE O/P EST MOD 30 MIN: CPT | Performed by: INTERNAL MEDICINE

## 2022-08-24 PROCEDURE — 36591 DRAW BLOOD OFF VENOUS DEVICE: CPT

## 2022-08-24 PROCEDURE — 80053 COMPREHEN METABOLIC PANEL: CPT

## 2022-08-25 ENCOUNTER — HOSPITAL ENCOUNTER (OUTPATIENT)
Dept: MRI IMAGING | Facility: HOSPITAL | Age: 62
Discharge: HOME | End: 2022-08-25
Attending: INTERNAL MEDICINE
Payer: COMMERCIAL

## 2022-08-25 DIAGNOSIS — C79.31 BRAIN METASTASIS (HCC): ICD-10-CM

## 2022-08-25 PROCEDURE — 70553 MRI BRAIN STEM W/O & W/DYE: CPT | Performed by: INTERNAL MEDICINE

## 2022-08-25 PROCEDURE — A9575 INJ GADOTERATE MEGLUMI 0.1ML: HCPCS | Performed by: INTERNAL MEDICINE

## 2022-08-26 ENCOUNTER — DOCUMENTATION ONLY (OUTPATIENT)
Dept: RADIATION ONCOLOGY | Facility: HOSPITAL | Age: 62
End: 2022-08-26

## 2022-08-26 RX ORDER — DEXAMETHASONE 2 MG/1
TABLET ORAL
Qty: 60 TABLET | Refills: 0 | Status: SHIPPED | OUTPATIENT
Start: 2022-08-26

## 2022-08-26 RX ORDER — PANTOPRAZOLE SODIUM 40 MG/1
40 TABLET, DELAYED RELEASE ORAL DAILY
Qty: 30 TABLET | Refills: 1 | Status: SHIPPED | OUTPATIENT
Start: 2022-08-26

## 2022-08-26 NOTE — PROGRESS NOTES
Discussed MRI brain results with patient. 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 He has felt dizzier the past few weeks. No nausea or vomiting. Finished his steroid taper about 3 weeks ago. We need to consider neurosurgical intervention. He will start dexamethasone 6mg once daily with PPI today. Should continue this dose until further plans in place. I have contacted our neurosurgery team & they will be reaching out to the patient for consultation early next week. Would consider post-op MRI around 6-8 weeks to evaluate the resection bed. I will see the patient when I return from maternity leave in late November. My partners are available for input while I am out. Patient is agreeable to above plan. Will  new RX today.     Franky Cantu MD

## 2022-08-29 ENCOUNTER — OFFICE VISIT (OUTPATIENT)
Dept: SURGERY | Facility: CLINIC | Age: 62
End: 2022-08-29
Payer: COMMERCIAL

## 2022-08-29 ENCOUNTER — TELEPHONE (OUTPATIENT)
Dept: RADIATION ONCOLOGY | Facility: HOSPITAL | Age: 62
End: 2022-08-29

## 2022-08-29 ENCOUNTER — TELEPHONE (OUTPATIENT)
Dept: SURGERY | Facility: CLINIC | Age: 62
End: 2022-08-29

## 2022-08-29 VITALS
SYSTOLIC BLOOD PRESSURE: 108 MMHG | HEART RATE: 65 BPM | BODY MASS INDEX: 23.56 KG/M2 | OXYGEN SATURATION: 98 % | DIASTOLIC BLOOD PRESSURE: 60 MMHG | WEIGHT: 120 LBS | HEIGHT: 60 IN

## 2022-08-29 DIAGNOSIS — D49.6 CEREBELLAR TUMOR (HCC): Primary | ICD-10-CM

## 2022-08-29 DIAGNOSIS — G93.9 CEREBELLAR LESION: Primary | ICD-10-CM

## 2022-08-29 RX ORDER — ONDANSETRON HYDROCHLORIDE 8 MG/1
8 TABLET, FILM COATED ORAL EVERY 8 HOURS PRN
COMMUNITY
Start: 2022-06-21

## 2022-08-29 NOTE — TELEPHONE ENCOUNTER
RN instructed patient to continue on 6 mg daily with PPI. Instructions per Dr. Rain Monteiro last note from 8/26/2022. Per Dr. Rain Monteiro note, patient to remain on dose until further plan is in place. Pt states his understanding.

## 2022-08-29 NOTE — TELEPHONE ENCOUNTER
Surgical date 9/13/22 on hold in outlook calendar for patient. Waiting for call back from [address] [place] [address]. To confirm okay to schedule. TE routed to provider for surgery order. Will need to call patient back to review surgical date and pre/post surgical instructions. Verified- ok to schedule to follow first case.

## 2022-08-31 NOTE — TELEPHONE ENCOUNTER
Called to confirm surgical date and review surgical instructions. No answer LMTCB.       Need to:  Confirm surgical date 9/13/22  Review surgical instructions  Schedule post op appointments  Send instructions via TrumpIT

## 2022-09-01 NOTE — TELEPHONE ENCOUNTER
Prior Authorization for inpatient surgery initiated with [address] [place] [address] department with [facility] [phone]. Requesting coverage for:Suboccipital Craniotomy  Date of Service: 9/13/2022   Inpatient days requested:2  CPT codes: 34149,36238,77564    Request for surgery pending review, pending reference #[id number]. Clinical notes submitted by fax #[phone], confirmation received.

## 2022-09-01 NOTE — TELEPHONE ENCOUNTER
Patient is scheduled for SUBOCCIPITAL CRANIOTOMY on 9.13.22 with Dr Jamee Medina. Y  form completed  Y Surgery order signed   Nitojinny Amish on surgery sheet  Y Placed on outlook calendar  Y Dis[employer] message sent to patient with sx instructions  Y Faxed pre-op clearance request to PCP [facility]   Y E-mail sent to [facility]  Y Post-op appointments made   Y PA NEEDED. Routed to PA team to initiate. Y 3 day follow up reminder placed.

## 2022-09-02 ENCOUNTER — TELEPHONE (OUTPATIENT)
Dept: SURGERY | Facility: CLINIC | Age: 62
End: 2022-09-02

## 2022-09-02 ENCOUNTER — APPOINTMENT (OUTPATIENT)
Dept: RADIATION ONCOLOGY | Facility: HOSPITAL | Age: 62
End: 2022-09-02
Attending: RADIOLOGY
Payer: COMMERCIAL

## 2022-09-02 NOTE — TELEPHONE ENCOUNTER
Prior authorization request completed for: Suboccipital Craniotomy  Authorization #[id number]  Authorization dates: 9/13/22 up to but not including 9/15/22  CPT codes approved: 53411,11737,21472  Number of visits/dates of service approved: 2  Physician: Dr Mercedes Trevino  Location: [facility]

## 2022-09-02 NOTE — TELEPHONE ENCOUNTER
pt has surgery 9/13/22 and has a dentist appt 9/9/22, wants to know is that will effects him; please advise    [phone]

## 2022-09-06 NOTE — TELEPHONE ENCOUNTER
Pt states he is having a regular cleaning, no fillings or any thing else. His dentist is not requesting clearance. Pt states he may cancel cleaning, he is not yet sure. Message forwarded to Dr. Inga Grant as stated below.

## 2022-09-06 NOTE — TELEPHONE ENCOUNTER
Please confirm what the patient is having done at the dentist, standard cleaning vs procedure? Please route directly to Dr. Anthony Preston as he'll need to provide clearance on this.

## 2022-09-07 NOTE — TELEPHONE ENCOUNTER
LMTCB:  Attempted to call pt to inform that Dr. Kenny Coates will not be available for surgery on 9/13/2022.     Dr. Kenny Coates is asking if pt has medical clearance, if so we can plan for surgery tomorrow 9/8/22 or if not we can postpone to 9/20 or 9/27

## 2022-09-08 NOTE — TELEPHONE ENCOUNTER
Called pt to r/s sx. He was unable to talk and will call back.   Pls put call through to Tod at [phone] when pt returns call

## 2022-09-08 NOTE — TELEPHONE ENCOUNTER
Pt returning nurse's call. Tried to transfer to nurse station several times but no answer . Pt states he is aval all day and will have cell ph with him.

## 2022-09-09 ENCOUNTER — TELEPHONE (OUTPATIENT)
Dept: FAMILY MEDICINE CLINIC | Facility: CLINIC | Age: 62
End: 2022-09-09

## 2022-09-09 NOTE — TELEPHONE ENCOUNTER
Pt is upset that his surgery is now stating 9/27/22- verified case change request has not been placed to change surgery. Informed him we will discuss with Dr. Essie Presley and get back to him. Pt feels 9/27/22 is too far out for his surgery. MD is not available for 9/27/22 surgery, new arrangements will need to be made.

## 2022-09-09 NOTE — TELEPHONE ENCOUNTER
The patient is looking for an appointment for clearance for a surgery. He is not sure of the date of the surgery just yet.

## 2022-09-09 NOTE — TELEPHONE ENCOUNTER
Called to advise pt that surgery date would be needed first and to call back and schedule pre-op appt with MP. Pt stated that this is complicated and stated he is waiting for them to call him back with date.

## 2022-09-09 NOTE — TELEPHONE ENCOUNTER
He will need a pre-op visit likely within 30 days of his surgery date. Once he confirms the date, he should call the office and we can schedule his visit at that time. Can use SDA slot if needed. Thank you.

## 2022-09-12 NOTE — TELEPHONE ENCOUNTER
Called and spoke with patient who states he doesn't think he can proceed with surgery on 9/19/22 due to him working on a project and not being able to complete it by Monday. Pt inquired that we ask the MD if surgery on Monday was necessary. Discussed with Dr. Darrell Mckeon who stated he strongly recommends surgery sooner rather than later. Per Pt PCP clearance appointment has not yet been made because his PCP office won't let him schedule without a finalized surgical date. Advised pt we would reach out to PCP office to facilitate appointment. Called his PCP office, spoke to  who stated she would get message over to nursing staff. Letter to PCP office initially faxed on 8/29/22 informing of surgery and need for clearance. Clearance is valid for 30 days. Surgery will be within that period. Lack of medical clearance will delay surgery. New letter requesting medical clearance was faxed with new surgery date. Case change request was sent. Pt should have no issues scheduling labs with new surgery date.

## 2022-09-12 NOTE — TELEPHONE ENCOUNTER
Called to discuss with patient: Cris FONSECA 9/2/22- pt upset at surgery being postponed, informed pt we would discuss with provider and get back to him. Discussed with provider who would like to see if pt can have surgery 9/19/22. Unfortunately, MD is not available for surgery on 9/13/22. If this day does not work for patient we will need to inquire and make arrangements for other day with OR and surgeon.

## 2022-09-12 NOTE — TELEPHONE ENCOUNTER
Pts sister calling to find out what is going on with surgery. Joaquin Graff he was to have surgery tomorrow but then was told it got cx so he cx covid test, etc. He needs to know when it is r/s for. Pts sister is not listed on verbal release form, told her we could speak to pt only. Please call pt to discuss.

## 2022-09-13 NOTE — TELEPHONE ENCOUNTER
Received fax indicating that the date for inpatient surgery has been changed. Approval #[id number] approved for 9/19/2022 2 days up to but not including 9/21/2022.

## 2022-09-13 NOTE — TELEPHONE ENCOUNTER
Called [facility] PA department and spoke with Rina Branham. She indicted that I need to fax the request for date change on surgery to [phone] under PA #[id number]. Sent faxed to change inpatient surgery date to 9/19/2022. Confirmed fax received.

## 2022-09-14 ENCOUNTER — OFFICE VISIT (OUTPATIENT)
Dept: FAMILY MEDICINE CLINIC | Facility: CLINIC | Age: 62
End: 2022-09-14
Payer: COMMERCIAL

## 2022-09-14 VITALS
HEIGHT: 60 IN | WEIGHT: 118 LBS | HEART RATE: 56 BPM | SYSTOLIC BLOOD PRESSURE: 100 MMHG | OXYGEN SATURATION: 98 % | DIASTOLIC BLOOD PRESSURE: 62 MMHG | BODY MASS INDEX: 23.16 KG/M2

## 2022-09-14 DIAGNOSIS — C34.92 SMALL CELL CARCINOMA OF LEFT LUNG (HCC): ICD-10-CM

## 2022-09-14 DIAGNOSIS — Z01.818 PREOPERATIVE CLEARANCE: Primary | ICD-10-CM

## 2022-09-14 DIAGNOSIS — C79.31 BRAIN METASTASES (HCC): ICD-10-CM

## 2022-09-14 PROCEDURE — 3078F DIAST BP <80 MM HG: CPT | Performed by: FAMILY MEDICINE

## 2022-09-14 PROCEDURE — 99213 OFFICE O/P EST LOW 20 MIN: CPT | Performed by: FAMILY MEDICINE

## 2022-09-14 PROCEDURE — 3074F SYST BP LT 130 MM HG: CPT | Performed by: FAMILY MEDICINE

## 2022-09-14 PROCEDURE — 3008F BODY MASS INDEX DOCD: CPT | Performed by: FAMILY MEDICINE

## 2022-09-15 ENCOUNTER — LABORATORY ENCOUNTER (OUTPATIENT)
Dept: LAB | Facility: HOSPITAL | Age: 62
End: 2022-09-15
Attending: NEUROLOGICAL SURGERY
Payer: COMMERCIAL

## 2022-09-15 ENCOUNTER — HOSPITAL ENCOUNTER (OUTPATIENT)
Dept: GENERAL RADIOLOGY | Facility: HOSPITAL | Age: 62
Discharge: HOME | End: 2022-09-15
Attending: NEUROLOGICAL SURGERY
Payer: COMMERCIAL

## 2022-09-15 ENCOUNTER — TELEPHONE (OUTPATIENT)
Dept: NEUROLOGY | Facility: CLINIC | Age: 62
End: 2022-09-15

## 2022-09-15 DIAGNOSIS — D49.6 CEREBELLAR TUMOR (HCC): ICD-10-CM

## 2022-09-15 LAB
ANTIBODY SCREEN: NEGATIVE
APTT PPP: 28 SECONDS (ref 23.3–35.6)
INR BLD: 0.96 (ref 0.85–1.16)
PROTHROMBIN TIME: 12.7 SECONDS (ref 11.6–14.8)
RH BLOOD TYPE: POSITIVE

## 2022-09-15 PROCEDURE — 36415 COLL VENOUS BLD VENIPUNCTURE: CPT

## 2022-09-15 PROCEDURE — 87081 CULTURE SCREEN ONLY: CPT

## 2022-09-15 PROCEDURE — 93010 ELECTROCARDIOGRAM REPORT: CPT | Performed by: NEUROLOGICAL SURGERY

## 2022-09-15 PROCEDURE — 86900 BLOOD TYPING SEROLOGIC ABO: CPT

## 2022-09-15 PROCEDURE — 85610 PROTHROMBIN TIME: CPT

## 2022-09-15 PROCEDURE — 85730 THROMBOPLASTIN TIME PARTIAL: CPT

## 2022-09-15 PROCEDURE — 86850 RBC ANTIBODY SCREEN: CPT

## 2022-09-15 PROCEDURE — 93005 ELECTROCARDIOGRAM TRACING: CPT

## 2022-09-15 PROCEDURE — 86901 BLOOD TYPING SEROLOGIC RH(D): CPT

## 2022-09-15 PROCEDURE — 87077 CULTURE AEROBIC IDENTIFY: CPT

## 2022-09-15 PROCEDURE — 71046 X-RAY EXAM CHEST 2 VIEWS: CPT | Performed by: NEUROLOGICAL SURGERY

## 2022-09-15 RX ORDER — DEXAMETHASONE 2 MG/1
TABLET ORAL
Qty: 20 TABLET | Refills: 0 | Status: ON HOLD | OUTPATIENT
Start: 2022-09-15

## 2022-09-15 NOTE — TELEPHONE ENCOUNTER
Discussed with Dr. Boyce Carlstadt who stated okay to refill medication. Pt also wanted to know surgical start time. Informed him as of now start time is 0730 but he will be getting a call from the OR staff 1-2 days before surgery and he should go off any time they give him. Pt informed of refill, verified pharmacy. Nothing further needed for this call. Provider can sign encounter after refilling medication.

## 2022-09-15 NOTE — TELEPHONE ENCOUNTER
Pt completed presurgical clearance exam on 9.14.22.  OV note in chart.   Dr Sherrill Aguilar states pt cleared for sx pending unremarkable lab results

## 2022-09-16 NOTE — TELEPHONE ENCOUNTER
Pt calling back to see if he can take the steroids on the day of his surgery or not. Should he bring all of his medications to the surgery?

## 2022-09-17 ENCOUNTER — LAB ENCOUNTER (OUTPATIENT)
Dept: LAB | Facility: HOSPITAL | Age: 62
End: 2022-09-17
Attending: NEUROLOGICAL SURGERY

## 2022-09-17 DIAGNOSIS — Z01.812 ENCOUNTER FOR PREOPERATIVE SCREENING LABORATORY TESTING FOR COVID-19 VIRUS: ICD-10-CM

## 2022-09-17 DIAGNOSIS — Z20.822 ENCOUNTER FOR PREOPERATIVE SCREENING LABORATORY TESTING FOR COVID-19 VIRUS: ICD-10-CM

## 2022-09-17 LAB — SARS-COV-2 RNA RESP QL NAA+PROBE: NOT DETECTED

## 2022-09-18 ENCOUNTER — ANESTHESIA EVENT (OUTPATIENT)
Dept: SURGERY | Facility: HOSPITAL | Age: 62
DRG: 025 | End: 2022-09-18
Payer: COMMERCIAL

## 2022-09-19 ENCOUNTER — APPOINTMENT (OUTPATIENT)
Dept: GENERAL RADIOLOGY | Facility: HOSPITAL | Age: 62
DRG: 025 | End: 2022-09-19
Attending: NEUROLOGICAL SURGERY
Payer: COMMERCIAL

## 2022-09-19 ENCOUNTER — NURSE ONLY (OUTPATIENT)
Dept: ELECTROPHYSIOLOGY | Facility: HOSPITAL | Age: 62
DRG: 025 | End: 2022-09-19
Attending: Other
Payer: COMMERCIAL

## 2022-09-19 ENCOUNTER — APPOINTMENT (OUTPATIENT)
Dept: CT IMAGING | Facility: HOSPITAL | Age: 62
DRG: 025 | End: 2022-09-19
Attending: NEUROLOGICAL SURGERY
Payer: COMMERCIAL

## 2022-09-19 ENCOUNTER — HOSPITAL ENCOUNTER (INPATIENT)
Facility: HOSPITAL | Age: 62
LOS: 3 days | Discharge: HOME | DRG: 025 | End: 2022-09-22
Attending: NEUROLOGICAL SURGERY | Admitting: NEUROLOGICAL SURGERY
Payer: COMMERCIAL

## 2022-09-19 ENCOUNTER — HOSPITAL ENCOUNTER (OUTPATIENT)
Dept: ULTRASOUND IMAGING | Facility: HOSPITAL | Age: 62
Discharge: HOME | DRG: 025 | End: 2022-09-19
Attending: NEUROLOGICAL SURGERY
Payer: COMMERCIAL

## 2022-09-19 ENCOUNTER — ANESTHESIA (OUTPATIENT)
Dept: SURGERY | Facility: HOSPITAL | Age: 62
DRG: 025 | End: 2022-09-19
Payer: COMMERCIAL

## 2022-09-19 DIAGNOSIS — Z20.822 ENCOUNTER FOR PREOPERATIVE SCREENING LABORATORY TESTING FOR COVID-19 VIRUS: Primary | ICD-10-CM

## 2022-09-19 DIAGNOSIS — D49.6 CEREBELLAR TUMOR (HCC): ICD-10-CM

## 2022-09-19 DIAGNOSIS — Z01.812 ENCOUNTER FOR PREOPERATIVE SCREENING LABORATORY TESTING FOR COVID-19 VIRUS: Primary | ICD-10-CM

## 2022-09-19 DIAGNOSIS — I48.92 ATRIAL FIBRILLATION AND FLUTTER (HCC): ICD-10-CM

## 2022-09-19 DIAGNOSIS — I48.91 ATRIAL FIBRILLATION AND FLUTTER (HCC): ICD-10-CM

## 2022-09-19 PROBLEM — Z11.52 ENCOUNTER FOR PREOPERATIVE SCREENING LABORATORY TESTING FOR COVID-19 VIRUS: Status: ACTIVE | Noted: 2021-02-15

## 2022-09-19 LAB
ALBUMIN SERPL-MCNC: 3.3 G/DL (ref 3.4–5)
ALBUMIN/GLOB SERPL: 1 {RATIO} (ref 1–2)
ALP LIVER SERPL-CCNC: 47 U/L
ALT SERPL-CCNC: 29 U/L
ANION GAP SERPL CALC-SCNC: 10 MMOL/L (ref 0–18)
AST SERPL-CCNC: 19 U/L (ref 15–37)
BASE EXCESS BLD CALC-SCNC: 2 MMOL/L
BASOPHILS # BLD AUTO: 0.05 X10(3) UL (ref 0–0.2)
BASOPHILS NFR BLD AUTO: 0.3 %
BILIRUB SERPL-MCNC: 0.7 MG/DL (ref 0.1–2)
BUN BLD-MCNC: 10 MG/DL (ref 7–18)
CA-I BLD-SCNC: 1.09 MMOL/L (ref 1.12–1.32)
CALCIUM BLD-MCNC: 8.2 MG/DL (ref 8.5–10.1)
CHLORIDE SERPL-SCNC: 103 MMOL/L (ref 98–112)
CO2 BLD-SCNC: 29 MMOL/L (ref 22–32)
CO2 SERPL-SCNC: 24 MMOL/L (ref 21–32)
CREAT BLD-MCNC: 0.9 MG/DL
EOSINOPHIL # BLD AUTO: 0 X10(3) UL (ref 0–0.7)
EOSINOPHIL NFR BLD AUTO: 0 %
ERYTHROCYTE [DISTWIDTH] IN BLOOD BY AUTOMATED COUNT: 14.3 %
GFR SERPLBLD BASED ON 1.73 SQ M-ARVRAT: 95 ML/MIN/1.73M2 (ref 60–?)
GLOBULIN PLAS-MCNC: 3.2 G/DL (ref 2.8–4.4)
GLUCOSE BLD-MCNC: 157 MG/DL (ref 70–99)
GLUCOSE BLD-MCNC: 215 MG/DL (ref 70–99)
HCO3 BLD-SCNC: 27.8 MEQ/L
HCT VFR BLD AUTO: 45.2 %
HCT VFR BLD CALC: 40 %
HGB BLD-MCNC: 15.3 G/DL
IMM GRANULOCYTES # BLD AUTO: 0.37 X10(3) UL (ref 0–1)
IMM GRANULOCYTES NFR BLD: 2.1 %
LYMPHOCYTES # BLD AUTO: 1.16 X10(3) UL (ref 1–4)
LYMPHOCYTES NFR BLD AUTO: 6.5 %
MAGNESIUM SERPL-MCNC: 1.7 MG/DL (ref 1.6–2.6)
MCH RBC QN AUTO: 32.5 PG (ref 26–34)
MCHC RBC AUTO-ENTMCNC: 33.8 G/DL (ref 31–37)
MCV RBC AUTO: 96 FL
MONOCYTES # BLD AUTO: 1.13 X10(3) UL (ref 0.1–1)
MONOCYTES NFR BLD AUTO: 6.3 %
NEUTROPHILS # BLD AUTO: 15.25 X10 (3) UL (ref 1.5–7.7)
NEUTROPHILS # BLD AUTO: 15.25 X10(3) UL (ref 1.5–7.7)
NEUTROPHILS NFR BLD AUTO: 84.8 %
OSMOLALITY SERPL CALC.SUM OF ELEC: 286 MOSM/KG (ref 275–295)
PCO2 BLD: 51.9 MMHG
PH BLD: 7.34 [PH]
PHOSPHATE SERPL-MCNC: 1.9 MG/DL (ref 2.5–4.9)
PLATELET # BLD AUTO: 134 10(3)UL (ref 150–450)
PO2 BLD: 292 MMHG
POTASSIUM BLD-SCNC: 4.3 MMOL/L (ref 3.6–5.1)
POTASSIUM SERPL-SCNC: 3.1 MMOL/L (ref 3.5–5.1)
PROT SERPL-MCNC: 6.5 G/DL (ref 6.4–8.2)
RBC # BLD AUTO: 4.71 X10(6)UL
SAO2 % BLD: 100 %
SODIUM BLD-SCNC: 135 MMOL/L (ref 136–145)
SODIUM SERPL-SCNC: 137 MMOL/L (ref 136–145)
WBC # BLD AUTO: 18 X10(3) UL (ref 4–11)

## 2022-09-19 PROCEDURE — 00BC0ZZ EXCISION OF CEREBELLUM, OPEN APPROACH: ICD-10-PCS | Performed by: NEUROLOGICAL SURGERY

## 2022-09-19 PROCEDURE — 70250 X-RAY EXAM OF SKULL: CPT | Performed by: NEUROLOGICAL SURGERY

## 2022-09-19 PROCEDURE — 70450 CT HEAD/BRAIN W/O DYE: CPT | Performed by: NEUROLOGICAL SURGERY

## 2022-09-19 PROCEDURE — 76998 US GUIDE INTRAOP: CPT | Performed by: NEUROLOGICAL SURGERY

## 2022-09-19 PROCEDURE — 76942 ECHO GUIDE FOR BIOPSY: CPT | Performed by: ANESTHESIOLOGY

## 2022-09-19 DEVICE — ADHERUS AUTOSPRAY DURAL SEALANT IS A STERILE, SINGLE-USE, ELECTROMECHANICAL, BATTERY OPERATED, DEVICE WITH INTERNAL SYSTEM COMPONENTS THAT PROVIDE AIR FLOW TO AID IN THE DELIVERY OF A SYNTHETIC, ABSORBABLE, TWO-COMPONENT HYDROGEL SEALANT SYSTEM AND ALLOW DELIVERY TO BE INTERRUPTED WITHOUT CLOGGING.
Type: IMPLANTABLE DEVICE | Site: HEAD | Status: FUNCTIONAL
Brand: ADHERUS AUTOSPRAY DURAL SEALANT

## 2022-09-19 DEVICE — DURAMATRIX 2X2 SUTURABLE: Type: IMPLANTABLE DEVICE | Site: HEAD | Status: FUNCTIONAL

## 2022-09-19 RX ORDER — MAGNESIUM SULFATE HEPTAHYDRATE 40 MG/ML
2 INJECTION, SOLUTION INTRAVENOUS ONCE
Status: COMPLETED | OUTPATIENT
Start: 2022-09-19 | End: 2022-09-20

## 2022-09-19 RX ORDER — DEXAMETHASONE SODIUM PHOSPHATE 4 MG/ML
VIAL (ML) INJECTION AS NEEDED
Status: DISCONTINUED | OUTPATIENT
Start: 2022-09-19 | End: 2022-09-19 | Stop reason: SURG

## 2022-09-19 RX ORDER — SCOLOPAMINE TRANSDERMAL SYSTEM 1 MG/1
1 PATCH, EXTENDED RELEASE TRANSDERMAL ONCE
Status: DISCONTINUED | OUTPATIENT
Start: 2022-09-19 | End: 2022-09-19 | Stop reason: HOSPADM

## 2022-09-19 RX ORDER — HYDROMORPHONE HYDROCHLORIDE 1 MG/ML
0.6 INJECTION, SOLUTION INTRAMUSCULAR; INTRAVENOUS; SUBCUTANEOUS EVERY 5 MIN PRN
Status: DISCONTINUED | OUTPATIENT
Start: 2022-09-19 | End: 2022-09-19

## 2022-09-19 RX ORDER — SODIUM CHLORIDE, SODIUM LACTATE, POTASSIUM CHLORIDE, CALCIUM CHLORIDE 600; 310; 30; 20 MG/100ML; MG/100ML; MG/100ML; MG/100ML
INJECTION, SOLUTION INTRAVENOUS CONTINUOUS
Status: DISCONTINUED | OUTPATIENT
Start: 2022-09-19 | End: 2022-09-19

## 2022-09-19 RX ORDER — LEVOTHYROXINE SODIUM 0.05 MG/1
50 TABLET ORAL
COMMUNITY

## 2022-09-19 RX ORDER — HYDROCODONE BITARTRATE AND ACETAMINOPHEN 5; 325 MG/1; MG/1
1 TABLET ORAL EVERY 6 HOURS PRN
Status: DISCONTINUED | OUTPATIENT
Start: 2022-09-19 | End: 2022-09-22

## 2022-09-19 RX ORDER — CEFAZOLIN SODIUM/WATER 2 G/20 ML
2 SYRINGE (ML) INTRAVENOUS ONCE
Status: COMPLETED | OUTPATIENT
Start: 2022-09-19 | End: 2022-09-19

## 2022-09-19 RX ORDER — SODIUM CHLORIDE 9 MG/ML
INJECTION, SOLUTION INTRAVENOUS CONTINUOUS
Status: DISCONTINUED | OUTPATIENT
Start: 2022-09-19 | End: 2022-09-22

## 2022-09-19 RX ORDER — EPHEDRINE SULFATE 50 MG/ML
INJECTION INTRAVENOUS AS NEEDED
Status: DISCONTINUED | OUTPATIENT
Start: 2022-09-19 | End: 2022-09-19 | Stop reason: SURG

## 2022-09-19 RX ORDER — ROCURONIUM BROMIDE 10 MG/ML
INJECTION, SOLUTION INTRAVENOUS AS NEEDED
Status: DISCONTINUED | OUTPATIENT
Start: 2022-09-19 | End: 2022-09-19 | Stop reason: SURG

## 2022-09-19 RX ORDER — ACETAMINOPHEN 10 MG/ML
1000 INJECTION, SOLUTION INTRAVENOUS EVERY 6 HOURS PRN
Status: DISCONTINUED | OUTPATIENT
Start: 2022-09-19 | End: 2022-09-22

## 2022-09-19 RX ORDER — LEVETIRACETAM 500 MG/5ML
1500 INJECTION, SOLUTION, CONCENTRATE INTRAVENOUS ONCE
Status: COMPLETED | OUTPATIENT
Start: 2022-09-19 | End: 2022-09-19

## 2022-09-19 RX ORDER — FLUOCINONIDE 0.5 MG/G
1 OINTMENT TOPICAL 2 TIMES DAILY
Status: DISCONTINUED | OUTPATIENT
Start: 2022-09-19 | End: 2022-09-22

## 2022-09-19 RX ORDER — PROCHLORPERAZINE EDISYLATE 5 MG/ML
5 INJECTION INTRAMUSCULAR; INTRAVENOUS EVERY 8 HOURS PRN
Status: DISCONTINUED | OUTPATIENT
Start: 2022-09-19 | End: 2022-09-22

## 2022-09-19 RX ORDER — LIDOCAINE HYDROCHLORIDE AND EPINEPHRINE 10; 10 MG/ML; UG/ML
INJECTION, SOLUTION INFILTRATION; PERINEURAL AS NEEDED
Status: DISCONTINUED | OUTPATIENT
Start: 2022-09-19 | End: 2022-09-19 | Stop reason: HOSPADM

## 2022-09-19 RX ORDER — HYDRALAZINE HYDROCHLORIDE 20 MG/ML
10 INJECTION INTRAMUSCULAR; INTRAVENOUS
Status: DISCONTINUED | OUTPATIENT
Start: 2022-09-19 | End: 2022-09-22

## 2022-09-19 RX ORDER — METOCLOPRAMIDE HYDROCHLORIDE 5 MG/ML
INJECTION INTRAMUSCULAR; INTRAVENOUS AS NEEDED
Status: DISCONTINUED | OUTPATIENT
Start: 2022-09-19 | End: 2022-09-19 | Stop reason: SURG

## 2022-09-19 RX ORDER — LEVETIRACETAM 500 MG/5ML
1500 INJECTION, SOLUTION, CONCENTRATE INTRAVENOUS EVERY 12 HOURS
Status: DISCONTINUED | OUTPATIENT
Start: 2022-09-19 | End: 2022-09-19

## 2022-09-19 RX ORDER — NALOXONE HYDROCHLORIDE 0.4 MG/ML
80 INJECTION, SOLUTION INTRAMUSCULAR; INTRAVENOUS; SUBCUTANEOUS AS NEEDED
Status: DISCONTINUED | OUTPATIENT
Start: 2022-09-19 | End: 2022-09-19

## 2022-09-19 RX ORDER — MIDAZOLAM HYDROCHLORIDE 1 MG/ML
INJECTION INTRAMUSCULAR; INTRAVENOUS
Status: COMPLETED
Start: 2022-09-19 | End: 2022-09-19

## 2022-09-19 RX ORDER — HYDROCODONE BITARTRATE AND ACETAMINOPHEN 10; 325 MG/1; MG/1
2 TABLET ORAL ONCE AS NEEDED
Status: ACTIVE | OUTPATIENT
Start: 2022-09-19 | End: 2022-09-19

## 2022-09-19 RX ORDER — HYDROCODONE BITARTRATE AND ACETAMINOPHEN 10; 325 MG/1; MG/1
1 TABLET ORAL ONCE AS NEEDED
Status: ACTIVE | OUTPATIENT
Start: 2022-09-19 | End: 2022-09-19

## 2022-09-19 RX ORDER — MIDAZOLAM HYDROCHLORIDE 1 MG/ML
2 INJECTION INTRAMUSCULAR; INTRAVENOUS ONCE
Status: DISCONTINUED | OUTPATIENT
Start: 2022-09-19 | End: 2022-09-22

## 2022-09-19 RX ORDER — MORPHINE SULFATE 2 MG/ML
2 INJECTION, SOLUTION INTRAMUSCULAR; INTRAVENOUS
Status: DISCONTINUED | OUTPATIENT
Start: 2022-09-19 | End: 2022-09-22

## 2022-09-19 RX ORDER — HYDROCODONE BITARTRATE AND ACETAMINOPHEN 5; 325 MG/1; MG/1
2 TABLET ORAL EVERY 6 HOURS PRN
Status: DISCONTINUED | OUTPATIENT
Start: 2022-09-19 | End: 2022-09-22

## 2022-09-19 RX ORDER — PANTOPRAZOLE SODIUM 40 MG/1
40 TABLET, DELAYED RELEASE ORAL
Status: DISCONTINUED | OUTPATIENT
Start: 2022-09-20 | End: 2022-09-22

## 2022-09-19 RX ORDER — HYDROMORPHONE HYDROCHLORIDE 1 MG/ML
0.2 INJECTION, SOLUTION INTRAMUSCULAR; INTRAVENOUS; SUBCUTANEOUS EVERY 5 MIN PRN
Status: DISCONTINUED | OUTPATIENT
Start: 2022-09-19 | End: 2022-09-19

## 2022-09-19 RX ORDER — MEPERIDINE HYDROCHLORIDE 25 MG/ML
12.5 INJECTION INTRAMUSCULAR; INTRAVENOUS; SUBCUTANEOUS AS NEEDED
Status: DISCONTINUED | OUTPATIENT
Start: 2022-09-19 | End: 2022-09-20

## 2022-09-19 RX ORDER — MANNITOL 20 G/100ML
INJECTION, SOLUTION INTRAVENOUS AS NEEDED
Status: DISCONTINUED | OUTPATIENT
Start: 2022-09-19 | End: 2022-09-19 | Stop reason: SURG

## 2022-09-19 RX ORDER — ACETAMINOPHEN 500 MG
1000 TABLET ORAL ONCE AS NEEDED
Status: ACTIVE | OUTPATIENT
Start: 2022-09-19 | End: 2022-09-19

## 2022-09-19 RX ORDER — SODIUM CHLORIDE 9 MG/ML
INJECTION, SOLUTION INTRAVENOUS CONTINUOUS
Status: DISCONTINUED | OUTPATIENT
Start: 2022-09-19 | End: 2022-09-19

## 2022-09-19 RX ORDER — MIDAZOLAM HYDROCHLORIDE 1 MG/ML
2 INJECTION INTRAMUSCULAR; INTRAVENOUS
Status: DISCONTINUED | OUTPATIENT
Start: 2022-09-19 | End: 2022-09-22

## 2022-09-19 RX ORDER — LIDOCAINE HYDROCHLORIDE 10 MG/ML
INJECTION, SOLUTION EPIDURAL; INFILTRATION; INTRACAUDAL; PERINEURAL AS NEEDED
Status: DISCONTINUED | OUTPATIENT
Start: 2022-09-19 | End: 2022-09-19 | Stop reason: SURG

## 2022-09-19 RX ORDER — ONDANSETRON 2 MG/ML
4 INJECTION INTRAMUSCULAR; INTRAVENOUS EVERY 6 HOURS PRN
Status: DISCONTINUED | OUTPATIENT
Start: 2022-09-19 | End: 2022-09-22

## 2022-09-19 RX ORDER — LABETALOL HYDROCHLORIDE 5 MG/ML
10 INJECTION, SOLUTION INTRAVENOUS AS NEEDED
Status: DISCONTINUED | OUTPATIENT
Start: 2022-09-19 | End: 2022-09-22

## 2022-09-19 RX ORDER — SODIUM CHLORIDE 9 MG/ML
INJECTION, SOLUTION INTRAVENOUS CONTINUOUS PRN
Status: DISCONTINUED | OUTPATIENT
Start: 2022-09-19 | End: 2022-09-19 | Stop reason: SURG

## 2022-09-19 RX ORDER — PHENYLEPHRINE HCL 10 MG/ML
VIAL (ML) INJECTION AS NEEDED
Status: DISCONTINUED | OUTPATIENT
Start: 2022-09-19 | End: 2022-09-19 | Stop reason: SURG

## 2022-09-19 RX ORDER — LEVETIRACETAM 500 MG/5ML
1000 INJECTION, SOLUTION, CONCENTRATE INTRAVENOUS EVERY 12 HOURS
Status: DISCONTINUED | OUTPATIENT
Start: 2022-09-20 | End: 2022-09-20

## 2022-09-19 RX ORDER — ONDANSETRON 2 MG/ML
INJECTION INTRAMUSCULAR; INTRAVENOUS AS NEEDED
Status: DISCONTINUED | OUTPATIENT
Start: 2022-09-19 | End: 2022-09-19 | Stop reason: SURG

## 2022-09-19 RX ORDER — HEPARIN SODIUM 5000 [USP'U]/ML
5000 INJECTION, SOLUTION INTRAVENOUS; SUBCUTANEOUS EVERY 12 HOURS SCHEDULED
Status: DISCONTINUED | OUTPATIENT
Start: 2022-09-20 | End: 2022-09-22

## 2022-09-19 RX ORDER — DEXAMETHASONE SODIUM PHOSPHATE 4 MG/ML
4 VIAL (ML) INJECTION EVERY 6 HOURS
Status: DISCONTINUED | OUTPATIENT
Start: 2022-09-19 | End: 2022-09-21

## 2022-09-19 RX ORDER — LEVOTHYROXINE SODIUM 0.05 MG/1
50 TABLET ORAL
Status: DISCONTINUED | OUTPATIENT
Start: 2022-09-19 | End: 2022-09-22

## 2022-09-19 RX ORDER — LABETALOL HYDROCHLORIDE 5 MG/ML
5 INJECTION, SOLUTION INTRAVENOUS EVERY 5 MIN PRN
Status: DISCONTINUED | OUTPATIENT
Start: 2022-09-19 | End: 2022-09-19

## 2022-09-19 RX ORDER — MIDAZOLAM HYDROCHLORIDE 1 MG/ML
1 INJECTION INTRAMUSCULAR; INTRAVENOUS EVERY 5 MIN PRN
Status: DISCONTINUED | OUTPATIENT
Start: 2022-09-19 | End: 2022-09-19

## 2022-09-19 RX ORDER — HYDROMORPHONE HYDROCHLORIDE 1 MG/ML
0.4 INJECTION, SOLUTION INTRAMUSCULAR; INTRAVENOUS; SUBCUTANEOUS EVERY 5 MIN PRN
Status: DISCONTINUED | OUTPATIENT
Start: 2022-09-19 | End: 2022-09-19

## 2022-09-19 RX ORDER — ACETAMINOPHEN 500 MG
1000 TABLET ORAL ONCE
Status: DISCONTINUED | OUTPATIENT
Start: 2022-09-19 | End: 2022-09-19 | Stop reason: HOSPADM

## 2022-09-19 RX ADMIN — PHENYLEPHRINE HCL 100 MCG: 10 MG/ML VIAL (ML) INJECTION at 07:56:00

## 2022-09-19 RX ADMIN — CEFAZOLIN SODIUM/WATER 2 G: 2 G/20 ML SYRINGE (ML) INTRAVENOUS at 12:00:00

## 2022-09-19 RX ADMIN — ROCURONIUM BROMIDE 20 MG: 10 INJECTION, SOLUTION INTRAVENOUS at 16:30:00

## 2022-09-19 RX ADMIN — DEXAMETHASONE SODIUM PHOSPHATE 10 MG: 4 MG/ML VIAL (ML) INJECTION at 07:51:00

## 2022-09-19 RX ADMIN — CEFAZOLIN SODIUM/WATER 2 G: 2 G/20 ML SYRINGE (ML) INTRAVENOUS at 16:00:00

## 2022-09-19 RX ADMIN — ROCURONIUM BROMIDE 10 MG: 10 INJECTION, SOLUTION INTRAVENOUS at 15:50:00

## 2022-09-19 RX ADMIN — SODIUM CHLORIDE: 9 INJECTION, SOLUTION INTRAVENOUS at 18:08:00

## 2022-09-19 RX ADMIN — EPHEDRINE SULFATE 10 MG: 50 INJECTION INTRAVENOUS at 07:56:00

## 2022-09-19 RX ADMIN — SODIUM CHLORIDE: 9 INJECTION, SOLUTION INTRAVENOUS at 08:10:00

## 2022-09-19 RX ADMIN — SODIUM CHLORIDE: 9 INJECTION, SOLUTION INTRAVENOUS at 12:42:00

## 2022-09-19 RX ADMIN — CEFAZOLIN SODIUM/WATER 2 G: 2 G/20 ML SYRINGE (ML) INTRAVENOUS at 08:00:00

## 2022-09-19 RX ADMIN — SODIUM CHLORIDE: 9 INJECTION, SOLUTION INTRAVENOUS at 16:34:00

## 2022-09-19 RX ADMIN — ROCURONIUM BROMIDE 20 MG: 10 INJECTION, SOLUTION INTRAVENOUS at 14:37:00

## 2022-09-19 RX ADMIN — MANNITOL 250 ML: 20 INJECTION, SOLUTION INTRAVENOUS at 08:22:00

## 2022-09-19 RX ADMIN — ROCURONIUM BROMIDE 50 MG: 10 INJECTION, SOLUTION INTRAVENOUS at 07:51:00

## 2022-09-19 RX ADMIN — ROCURONIUM BROMIDE 20 MG: 10 INJECTION, SOLUTION INTRAVENOUS at 12:11:00

## 2022-09-19 RX ADMIN — EPHEDRINE SULFATE 10 MG: 50 INJECTION INTRAVENOUS at 08:02:00

## 2022-09-19 RX ADMIN — ROCURONIUM BROMIDE 20 MG: 10 INJECTION, SOLUTION INTRAVENOUS at 09:52:00

## 2022-09-19 RX ADMIN — ROCURONIUM BROMIDE 20 MG: 10 INJECTION, SOLUTION INTRAVENOUS at 13:15:00

## 2022-09-19 RX ADMIN — LIDOCAINE HYDROCHLORIDE 50 MG: 10 INJECTION, SOLUTION EPIDURAL; INFILTRATION; INTRACAUDAL; PERINEURAL at 07:51:00

## 2022-09-19 RX ADMIN — ONDANSETRON 4 MG: 2 INJECTION INTRAMUSCULAR; INTRAVENOUS at 16:03:00

## 2022-09-19 RX ADMIN — ROCURONIUM BROMIDE 20 MG: 10 INJECTION, SOLUTION INTRAVENOUS at 11:25:00

## 2022-09-19 RX ADMIN — SODIUM CHLORIDE: 9 INJECTION, SOLUTION INTRAVENOUS at 11:19:00

## 2022-09-19 RX ADMIN — METOCLOPRAMIDE HYDROCHLORIDE 10 MG: 5 INJECTION INTRAMUSCULAR; INTRAVENOUS at 16:03:00

## 2022-09-19 NOTE — ANESTHESIA PROCEDURE NOTES
Peripheral IV  Date/Time: 9/19/2022 8:00 AM  Inserted by: Reinaldo Bravo MD    Placement  Needle size: 16 G  Laterality: left  Location: hand  Local anesthetic: none  Site prep: chlorhexidine  Technique: anatomical landmarks  Attempts: 1

## 2022-09-19 NOTE — BRIEF OP NOTE
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

## 2022-09-19 NOTE — ANESTHESIA PROCEDURE NOTES
Arterial Line  Performed by: Carolyn Lloyd MD  Authorized by: Carolyn Lloyd MD     General Information and Staff    Procedure Start:  9/19/2022 8:01 AM  Procedure End:  9/19/2022 8:05 AM  Anesthesiologist:  Carolyn Lloyd MD  Performed By:  Anesthesiologist  Patient Location:  OR  Indication: continuous blood pressure monitoring and blood sampling needed    Site Identification: real time ultrasound guided and surface landmarks    Preanesthetic Checklist: 2 patient identifiers, IV checked, risks and benefits discussed, monitors and equipment checked, pre-op evaluation, timeout performed, anesthesia consent and sterile technique used    Procedure Details    Catheter Size:  20 G  Catheter Length:  1 and 3/4 inchCatheter Type:  Arrow  Seldinger Technique?: Yes    Laterality:  LeftSite:  Radial artery  Site Prep: chlorhexidine  Line Secured:  Wrist Brace, tape and Tegaderm    Assessment    Events: patient tolerated procedure well with no complications      Medications      Additional Comments

## 2022-09-19 NOTE — ANESTHESIA PROCEDURE NOTES
Airway  Date/Time: 9/19/2022 7:53 AM  Urgency: elective      General Information and Staff    Patient location during procedure: OR  Anesthesiologist: Piotr Dominique MD  Performed: anesthesiologist     Indications and Patient Condition  Indications for airway management: anesthesia  Sedation level: deep  Preoxygenated: yes  Patient position: sniffing  Mask difficulty assessment: 1 - vent by mask    Final Airway Details  Final airway type: endotracheal airway      Successful airway: ETT  Cuffed: yes   Successful intubation technique: direct laryngoscopy  Endotracheal tube insertion site: oral  Blade: Laurel  Blade size: #4  ETT size (mm): 7.5    Cormack-Lehane Classification: grade I - full view of glottis  Placement verified by: chest auscultation and capnometry   Measured from: lips  ETT to lips (cm): 22  Number of attempts at approach: 1

## 2022-09-20 ENCOUNTER — APPOINTMENT (OUTPATIENT)
Dept: MRI IMAGING | Facility: HOSPITAL | Age: 62
DRG: 025 | End: 2022-09-20
Attending: NURSE PRACTITIONER
Payer: COMMERCIAL

## 2022-09-20 LAB
ANION GAP SERPL CALC-SCNC: 7 MMOL/L (ref 0–18)
BUN BLD-MCNC: 9 MG/DL (ref 7–18)
CALCIUM BLD-MCNC: 8.7 MG/DL (ref 8.5–10.1)
CHLORIDE SERPL-SCNC: 107 MMOL/L (ref 98–112)
CO2 SERPL-SCNC: 25 MMOL/L (ref 21–32)
CREAT BLD-MCNC: 0.7 MG/DL
ERYTHROCYTE [DISTWIDTH] IN BLOOD BY AUTOMATED COUNT: 14.3 %
GFR SERPLBLD BASED ON 1.73 SQ M-ARVRAT: 103 ML/MIN/1.73M2 (ref 60–?)
GLUCOSE BLD-MCNC: 139 MG/DL (ref 70–99)
HCT VFR BLD AUTO: 42.6 %
HGB BLD-MCNC: 14.4 G/DL
MAGNESIUM SERPL-MCNC: 2.7 MG/DL (ref 1.6–2.6)
MCH RBC QN AUTO: 32.4 PG (ref 26–34)
MCHC RBC AUTO-ENTMCNC: 33.8 G/DL (ref 31–37)
MCV RBC AUTO: 95.7 FL
OSMOLALITY SERPL CALC.SUM OF ELEC: 289 MOSM/KG (ref 275–295)
PLATELET # BLD AUTO: 128 10(3)UL (ref 150–450)
POTASSIUM SERPL-SCNC: 4.4 MMOL/L (ref 3.5–5.1)
RBC # BLD AUTO: 4.45 X10(6)UL
SODIUM SERPL-SCNC: 139 MMOL/L (ref 136–145)
WBC # BLD AUTO: 13.8 X10(3) UL (ref 4–11)

## 2022-09-20 PROCEDURE — 95720 EEG PHY/QHP EA INCR W/VEEG: CPT | Performed by: OTHER

## 2022-09-20 PROCEDURE — 99291 CRITICAL CARE FIRST HOUR: CPT | Performed by: OTHER

## 2022-09-20 PROCEDURE — 99291 CRITICAL CARE FIRST HOUR: CPT | Performed by: NURSE PRACTITIONER

## 2022-09-20 PROCEDURE — 70553 MRI BRAIN STEM W/O & W/DYE: CPT | Performed by: NURSE PRACTITIONER

## 2022-09-20 PROCEDURE — 99292 CRITICAL CARE ADDL 30 MIN: CPT | Performed by: OTHER

## 2022-09-20 RX ORDER — METHOCARBAMOL 100 MG/ML
500 INJECTION, SOLUTION INTRAMUSCULAR; INTRAVENOUS EVERY 12 HOURS
Status: COMPLETED | OUTPATIENT
Start: 2022-09-20 | End: 2022-09-21

## 2022-09-20 RX ORDER — ORPHENADRINE CITRATE 30 MG/ML
30 INJECTION INTRAMUSCULAR; INTRAVENOUS EVERY 6 HOURS
Status: DISCONTINUED | OUTPATIENT
Start: 2022-09-20 | End: 2022-09-20

## 2022-09-20 RX ORDER — LEVETIRACETAM 500 MG/5ML
500 INJECTION, SOLUTION, CONCENTRATE INTRAVENOUS EVERY 12 HOURS
Status: DISCONTINUED | OUTPATIENT
Start: 2022-09-20 | End: 2022-09-21

## 2022-09-20 RX ORDER — METHOCARBAMOL 500 MG/1
500 TABLET, FILM COATED ORAL EVERY 12 HOURS
Status: DISCONTINUED | OUTPATIENT
Start: 2022-09-21 | End: 2022-09-22

## 2022-09-20 RX ORDER — LEVETIRACETAM 500 MG/5ML
500 INJECTION, SOLUTION, CONCENTRATE INTRAVENOUS EVERY 12 HOURS
Status: DISCONTINUED | OUTPATIENT
Start: 2022-09-20 | End: 2022-09-20

## 2022-09-20 NOTE — PHYSICAL THERAPY NOTE
Order received for PT eval and chart reviewed. Pt currently occupied with 24hr EEG. PT will continue to follow.

## 2022-09-20 NOTE — PROGRESS NOTES
Post Op Note    Eyes open spontaneous  Nonverbal  Left gaze deviation  Blinking continuously, tremulous x 4  Localizes x 4, symmetric, antigravity    Head CT demonstrates supratentorial and infratentorial pneumocephalus, no evidence of hemorrhage    Discussed with NCC - will given 2g versed, load with keppra, and obtain a stat EEG

## 2022-09-20 NOTE — PLAN OF CARE
Assumed care of patient at 1. Patient was very restless and kept trying to get up and was not able to get comfortable. Initially my neuro assessment the patient was not able to articulate many words however this improved throughout the night. Patient was able to move all extremeties without showing any signs of weakness. Patient vomited four times throughout the night- anti nausea was provided with very minimal relief. Tylenol helped the patient relax and get some rest. Will continue to monitor patient.

## 2022-09-20 NOTE — PLAN OF CARE
Pt from OR at 1800. As pt was waking up, eyes would open and blinking continuously. Left gaze bilaterally. Moving all extremities R>L, occ following command, but not consistent. For the most part, nonverbal. Alayna Luz his name once. Right arm shaking. Dr Albert Dutta at bedside, updated. CT of head ordered stat and pt taken down to CT. Dr Albert Dutta accompanied to CT as well, reviewed and discussed wit [place]. Versed given for possible seizure, newly immediately after pt able to say his name and what procedure he had down. Stat EEG ordered, called in. Keppra ordered. Cardene to keep SBP less then 160. NSR on monitor.

## 2022-09-20 NOTE — PROGRESS NOTES
Mr. Kehinde Soto is awake, alert and responding to questions appropriately. LEGGETT, PMS x 4 GCS 15. Continues to have nystagmus but improving, No motor deficits noted. EEG complete, no seizure activity noted during day shift. skyla Goldberg removed. Urinating ok with some flow issues. No additional events to note.

## 2022-09-21 LAB
ANION GAP SERPL CALC-SCNC: 4 MMOL/L (ref 0–18)
BUN BLD-MCNC: 15 MG/DL (ref 7–18)
CALCIUM BLD-MCNC: 8.7 MG/DL (ref 8.5–10.1)
CHLORIDE SERPL-SCNC: 109 MMOL/L (ref 98–112)
CO2 SERPL-SCNC: 25 MMOL/L (ref 21–32)
CREAT BLD-MCNC: 0.7 MG/DL
ERYTHROCYTE [DISTWIDTH] IN BLOOD BY AUTOMATED COUNT: 14.7 %
GFR SERPLBLD BASED ON 1.73 SQ M-ARVRAT: 103 ML/MIN/1.73M2 (ref 60–?)
GLUCOSE BLD-MCNC: 129 MG/DL (ref 70–99)
HCT VFR BLD AUTO: 45.7 %
HGB BLD-MCNC: 14.3 G/DL
MCH RBC QN AUTO: 31.9 PG (ref 26–34)
MCHC RBC AUTO-ENTMCNC: 31.3 G/DL (ref 31–37)
MCV RBC AUTO: 102 FL
OSMOLALITY SERPL CALC.SUM OF ELEC: 289 MOSM/KG (ref 275–295)
PLATELET # BLD AUTO: 127 10(3)UL (ref 150–450)
POTASSIUM SERPL-SCNC: 4.3 MMOL/L (ref 3.5–5.1)
RBC # BLD AUTO: 4.48 X10(6)UL
SODIUM SERPL-SCNC: 138 MMOL/L (ref 136–145)
WBC # BLD AUTO: 13.9 X10(3) UL (ref 4–11)

## 2022-09-21 PROCEDURE — 99233 SBSQ HOSP IP/OBS HIGH 50: CPT | Performed by: OTHER

## 2022-09-21 RX ORDER — LEVETIRACETAM 500 MG/1
500 TABLET ORAL 2 TIMES DAILY
Status: DISCONTINUED | OUTPATIENT
Start: 2022-09-21 | End: 2022-09-22

## 2022-09-21 RX ORDER — DEXAMETHASONE SODIUM PHOSPHATE 4 MG/ML
4 VIAL (ML) INJECTION EVERY 8 HOURS
Status: DISCONTINUED | OUTPATIENT
Start: 2022-09-21 | End: 2022-09-22

## 2022-09-21 NOTE — PLAN OF CARE
Assumed care of patient at 1, patient was alert and oriented x4 and oxygenating on room air at 97%. Patient is strong bilaterally and shows no signs of drooping or weakness. Nystagmus is still present in the eyes. Patient is able to ambulate independently with a gait belt. Patient felt dizzy  when ambulating but instantly recovered. Will continue to monitor patient.

## 2022-09-21 NOTE — PLAN OF CARE
Transferred into [facility] via [address] from [address] [facility] A&OX4. Tele - SR. Incision Back of head BEAU. Instructed by Neuro Surgery not to drive for 6 months. Pt gait/balance continues to be off. Instructed to call to go to [facility] for assistance for safety. Minor Nystagymus continues, but improved. Sister at bedside. Plan for possible dc tomorrow with [facility] PT/OT. Resting comfortably. Will continue to monitor.       Problem: Patient/Family Goals  Goal: Patient/Family Long Term Goal  Description: Patient's Long Term Goal: Return Home    Interventions:  - Participate in POC  -Participate in Rehab therapy  - See additional Care Plan goals for specific interventions  Outcome: Progressing  Goal: Patient/Family Short Term Goal  Description: Patient's Short Term Goal: Pain & seizure free    Interventions:   - Take meds as ordered  -Participate in POC  - See additional Care Plan goals for specific interventions  Outcome: Progressing     Problem: SKIN/TISSUE INTEGRITY - ADULT  Goal: Incision(s), wounds(s) or drain site(s) healing without S/S of infection  Description: INTERVENTIONS:  - Assess and document risk factors for pressure ulcer development  - Assess and document skin integrity  - Assess and document dressing/incision, wound bed, drain sites and surrounding tissue  - Implement wound care per orders  - Initiate isolation precautions as appropriate  - Initiate Pressure Ulcer prevention bundle as indicated  Outcome: Progressing     Problem: MUSCULOSKELETAL - ADULT  Goal: Return mobility to safest level of function  Description: INTERVENTIONS:  - Assess patient stability and activity tolerance for standing, transferring and ambulating w/ or w/o assistive devices  - Assist with transfers and ambulation using safe patient handling equipment as needed  - Ensure adequate protection for wounds/incisions during mobilization  - Obtain PT/OT consults as needed  - Advance activity as appropriate  - Communicate ordered activity level and limitations with patient/family  Outcome: Progressing     Problem: NEUROLOGICAL - ADULT  Goal: Achieves stable or improved neurological status  Description: INTERVENTIONS  - Assess for and report changes in neurological status  - Initiate measures to prevent increased intracranial pressure  - Maintain blood pressure and fluid volume within ordered parameters to optimize cerebral perfusion and minimize risk of hemorrhage  - Monitor temperature, glucose, and sodium. Initiate appropriate interventions as ordered  Outcome: Progressing  Goal: Absence of seizures  Description: INTERVENTIONS  - Monitor for seizure activity  - Administer anti-seizure medications as ordered  - Monitor neurological status  Outcome: Progressing     Problem: PAIN - ADULT  Goal: Verbalizes/displays adequate comfort level or patient's stated pain goal  Description: INTERVENTIONS:  - Encourage pt to monitor pain and request assistance  - Assess pain using appropriate pain scale  - Administer analgesics based on type and severity of pain and evaluate response  - Implement non-pharmacological measures as appropriate and evaluate response  - Consider cultural and social influences on pain and pain management  - Manage/alleviate anxiety  - Utilize distraction and/or relaxation techniques  - Monitor for opioid side effects  - Notify MD/LIP if interventions unsuccessful or patient reports new pain  - Anticipate increased pain with activity and pre-medicate as appropriate  Outcome: Progressing     Problem: SAFETY ADULT - FALL  Goal: Free from fall injury  Description: INTERVENTIONS:  - Assess pt frequently for physical needs  - Identify cognitive and physical deficits and behaviors that affect risk of falls.   - [place] fall precautions as indicated by assessment.  - Educate pt/family on patient safety including physical limitations  - Instruct pt to call for assistance with activity based on assessment  - Modify environment to reduce risk of injury  - Provide assistive devices as appropriate  - Consider OT/PT consult to assist with strengthening/mobility  - Encourage toileting schedule  Outcome: Progressing

## 2022-09-21 NOTE — CM/SW NOTE
Chart reviewed, therapy recommending HH at [facility].  SW started referral.     Rita Chinchilla [facility]  Discharge [facility]

## 2022-09-22 ENCOUNTER — APPOINTMENT (OUTPATIENT)
Dept: CV DIAGNOSTICS | Facility: HOSPITAL | Age: 62
DRG: 025 | End: 2022-09-22
Attending: HOSPITALIST
Payer: COMMERCIAL

## 2022-09-22 VITALS
BODY MASS INDEX: 21.38 KG/M2 | HEIGHT: 60 IN | OXYGEN SATURATION: 97 % | HEART RATE: 72 BPM | WEIGHT: 108.88 LBS | RESPIRATION RATE: 23 BRPM | SYSTOLIC BLOOD PRESSURE: 119 MMHG | DIASTOLIC BLOOD PRESSURE: 52 MMHG | TEMPERATURE: 98 F

## 2022-09-22 PROCEDURE — 93306 TTE W/DOPPLER COMPLETE: CPT | Performed by: HOSPITALIST

## 2022-09-22 PROCEDURE — 99254 IP/OBS CNSLTJ NEW/EST MOD 60: CPT | Performed by: HOSPITALIST

## 2022-09-22 RX ORDER — DEXAMETHASONE 4 MG/1
4 TABLET ORAL EVERY 8 HOURS SCHEDULED
Status: DISCONTINUED | OUTPATIENT
Start: 2022-09-22 | End: 2022-09-22

## 2022-09-22 RX ORDER — ONDANSETRON HYDROCHLORIDE 8 MG/1
8 TABLET, FILM COATED ORAL EVERY 8 HOURS PRN
Qty: 20 TABLET | Refills: 0 | Status: SHIPPED | OUTPATIENT
Start: 2022-09-22

## 2022-09-22 RX ORDER — HYDROCODONE BITARTRATE AND ACETAMINOPHEN 5; 325 MG/1; MG/1
1-2 TABLET ORAL EVERY 6 HOURS PRN
Qty: 60 TABLET | Refills: 0 | Status: SHIPPED | OUTPATIENT
Start: 2022-09-22

## 2022-09-22 RX ORDER — DEXAMETHASONE 4 MG/1
TABLET ORAL
Qty: 20 TABLET | Refills: 0 | Status: SHIPPED | OUTPATIENT
Start: 2022-09-22 | End: 2022-10-06

## 2022-09-22 RX ORDER — METHOCARBAMOL 500 MG/1
500 TABLET, FILM COATED ORAL 3 TIMES DAILY PRN
Qty: 30 TABLET | Refills: 0 | Status: SHIPPED | OUTPATIENT
Start: 2022-09-22

## 2022-09-22 RX ORDER — LEVETIRACETAM 500 MG/1
500 TABLET ORAL 2 TIMES DAILY
Qty: 30 TABLET | Refills: 1 | Status: SHIPPED | OUTPATIENT
Start: 2022-09-22

## 2022-09-22 NOTE — PLAN OF CARE
Seizure precautions  Neuros a2hejmc  No BM for four days.  Slight nausea  No stool softners or mirlax ordered   PTOT  Dc for HHC  Sister at bedside

## 2022-09-22 NOTE — CM/SW NOTE
SW checked [facility] referral for [facility]. [facility] only accepting provider when referral window closed.  SW reserved in Aidin, information on AVS.     Angel Lo [facility]  Discharge 2011 [facility]

## 2022-09-22 NOTE — PROGRESS NOTES
Preparing to discharge valentin, and he felt palpitations noted. HR afib 130s. Less than a min later he converted to NSR and HR 60-70's. GENESIS Leong notified. Will continue to monitor patient. hospitalist consult.

## 2022-09-22 NOTE — PLAN OF CARE
Assumed care at [address].    A&Ox4, RA, SB on tele  Q4 Neuro, nystagmus bilaterally    Incision on back of head, C/D/I, open to air w/ staples   Prn norco given for pain w/ relief   Prn zofran given for nausea w/ relief   Call light within reach    Patient and sister updated on plan of care

## 2022-09-23 ENCOUNTER — TELEPHONE (OUTPATIENT)
Dept: FAMILY MEDICINE CLINIC | Facility: CLINIC | Age: 62
End: 2022-09-23

## 2022-09-23 ENCOUNTER — PATIENT OUTREACH (OUTPATIENT)
Dept: CASE MANAGEMENT | Age: 62
End: 2022-09-23

## 2022-09-23 DIAGNOSIS — D49.6 CEREBELLAR TUMOR (HCC): ICD-10-CM

## 2022-09-23 DIAGNOSIS — Z02.9 ENCOUNTERS FOR UNSPECIFIED ADMINISTRATIVE PURPOSE: ICD-10-CM

## 2022-09-23 PROCEDURE — 1111F DSCHRG MED/CURRENT MED MERGE: CPT

## 2022-09-23 NOTE — TELEPHONE ENCOUNTER
SONIA, Spoke to pt for TCM today. Pt declined to schedule with PCP at this time stating that he is following up with oncologists. please advise.         BOOK BY DATE (last date for TCM): 10/6/22

## 2022-09-26 ENCOUNTER — TELEPHONE (OUTPATIENT)
Dept: FAMILY MEDICINE CLINIC | Facility: CLINIC | Age: 62
End: 2022-09-26

## 2022-09-26 NOTE — TELEPHONE ENCOUNTER
Do not see a script for contour next in chart. Sister thought he said he was pre diabetic previously. She wanted to see if he should be checking again. He recently had pre op blood work.   Please advise

## 2022-09-26 NOTE — TELEPHONE ENCOUNTER
No need to check glucose at home. His glucose levels on blood work has been elevated, but needs an A1c. Recommend he f/u with me within the next month (once he feels better following surgery) and we can check an A1c. Please let pt & sister know. Thank you.

## 2022-09-26 NOTE — TELEPHONE ENCOUNTER
Patients sister calling. She would like to order Tumor cells and Tumor Marker labs.     She also hs questions regarding Natural biological products    Please return call to [address]

## 2022-09-26 NOTE — TELEPHONE ENCOUNTER
Patient sister is calling requesting a refill for patient   Glucose test stipes Contournext patient sister stated it was more then one year since he had them refilled. Please follow up with patient sister.              [facility] [address] [place] [address] [facility], [phone], [phone]

## 2022-09-27 ENCOUNTER — PATIENT MESSAGE (OUTPATIENT)
Dept: FAMILY MEDICINE CLINIC | Facility: CLINIC | Age: 62
End: 2022-09-27

## 2022-09-27 NOTE — TELEPHONE ENCOUNTER
From: Edwin Silva. To: Pb Connors DO  Sent: 2022 10:41 AM CDT  Subject: Blood glucose monitoring    I have heard back from the nurse that Lala Steven does not have to do blood glucose monitoring. However, as he has had elevated blood sugars in the past and his labs in my chart show elevations, I think monitoring is important especially since surgery and steroids can be a stress to push people into full-blown diabetes. We would appreciate the followin. Lab test for HbgA1C and insulin  2. Pharmacy prescription for Contour Next test strips at [facility] in [place].     Sincerely,  Darren Hardin's sister

## 2022-09-28 ENCOUNTER — OFFICE VISIT (OUTPATIENT)
Dept: HEMATOLOGY/ONCOLOGY | Facility: HOSPITAL | Age: 62
End: 2022-09-28
Attending: INTERNAL MEDICINE

## 2022-09-28 ENCOUNTER — TELEPHONE (OUTPATIENT)
Dept: SURGERY | Facility: CLINIC | Age: 62
End: 2022-09-28

## 2022-09-28 ENCOUNTER — PATIENT MESSAGE (OUTPATIENT)
Dept: FAMILY MEDICINE CLINIC | Facility: CLINIC | Age: 62
End: 2022-09-28

## 2022-09-28 VITALS
BODY MASS INDEX: 22.19 KG/M2 | OXYGEN SATURATION: 98 % | RESPIRATION RATE: 16 BRPM | HEIGHT: 60 IN | SYSTOLIC BLOOD PRESSURE: 124 MMHG | WEIGHT: 113 LBS | HEART RATE: 71 BPM | DIASTOLIC BLOOD PRESSURE: 61 MMHG | TEMPERATURE: 99 F

## 2022-09-28 DIAGNOSIS — E03.9 HYPOTHYROIDISM (ACQUIRED): ICD-10-CM

## 2022-09-28 DIAGNOSIS — C79.31 BRAIN METASTASES (HCC): ICD-10-CM

## 2022-09-28 DIAGNOSIS — C34.92 SMALL CELL CARCINOMA OF LEFT LUNG (HCC): Primary | ICD-10-CM

## 2022-09-28 DIAGNOSIS — R73.9 BLOOD GLUCOSE ELEVATED: Primary | ICD-10-CM

## 2022-09-28 PROCEDURE — 99214 OFFICE O/P EST MOD 30 MIN: CPT | Performed by: INTERNAL MEDICINE

## 2022-09-28 NOTE — TELEPHONE ENCOUNTER
From: Lm Garcia. To: Jose Berman DO  Sent: 2022 10:41 AM CDT  Subject: Blood glucose monitoring    I have heard back from the nurse that Roxana Edge does not have to do blood glucose monitoring. However, as he has had elevated blood sugars in the past and his labs in my chart show elevations, I think monitoring is important especially since surgery and steroids can be a stress to push people into full-blown diabetes. We would appreciate the followin. Lab test for HbgA1C and insulin  2. Pharmacy prescription for Contour Next test strips at [facility] on [address] in [place].     Sincerely,  Darren Vega's sister

## 2022-09-28 NOTE — TELEPHONE ENCOUNTER
Verified name and  of patient. Sister of patient (on JESUS ALBERTO) calling to follow up [employer] message. She states that in the past, he had been advised by previous doctor to monitor blood sugars due to prediabetes. She states that patient has been having frequent urination at night. She denies any other symptoms at this time. She is requesting lab orders to test Hemoglobin A1C and insulin. She is also requesting order for Contour next test strips with instructions on how many times he should check blood sugar as she would like him to start checking his blood sugars again.     Orders pended for your completion and approval.

## 2022-09-29 RX ORDER — PERPHENAZINE 16 MG/1
TABLET, FILM COATED ORAL
Qty: 100 EACH | Refills: 3 | Status: SHIPPED | OUTPATIENT
Start: 2022-09-29

## 2022-09-30 NOTE — OPERATIVE REPORT
[id number]  [address] [place] [address] 69.  7/29/1960    DATE OF OPERATION: 9/19/2022     PREOPERATIVE DIAGNOSIS:  Right cerebellar hemisphere metastatic tumor. POSTOPERATIVE DIAGNOSIS:  Right cerebellar hemisphere metastatic tumor. OPERATIVE PROCEDURE:  1. Right suboccipital craniectomy  2. Right cerebellar tumor resection  3. Intraoperative microscope use  4. Intraoperative ultrasound use  5. Neuronavigation    SURGEON:  Felix Arredondo MD     ASSISTANT:  MD Jalil Biswas RSA     ANESTHESIA:  General endotracheal anesthesia. ESTIMATED BLOOD LOSS:  50 mL     COMPLICATIONS:  None     WOUND CLASSIFICATION:  1     INDICATIONS FOR PROCEDURE:  The patient is a 57year old male with a history of metastatic small cell lung cancer who had growth of a right cerebellar tumor despite radiation therapy. The risks, benefits, and alternatives of a surgical resection of this lesion were explained in detail to the patient and they consent for the procedure. Preoperative dose of antibiotics, decadron, and mannitol were administered. DESCRIPTION OF PROCEDURE:  The patient was taken to the operating room and general anesthesia was induced. A Anand catheter was inserted. Other appropriate monitoring lines and devices were connected. The patient was positioned in a prone position with their head immobilized in a [place] head pérez with pins. Care was taken to ensure that all pressure points were adequately padded. The fiducials on the scalp registered for intraoperative neuronavigation using the Stealth system. Using this, we localized the tumor in the right cerebellum. This area was meticulously shaved, prepped and draped according to standard surgical principles. Local anesthesia was administered. A linear incision was made in the midline.   It was taken down through the subcutaneous tissues and a subperiosteal dissection was performed exposing the occipital bone down to the foramen magnum. Self-retaining retractors were inserted. A  was used to make three bur holes on the right side of the occipital bone and then a matchstick drill was used to thin the bone. Nikolai So were then used to perform the right sided craniectomy. Ultrasound and Stealth indicated that we had good exposure of the tumor. The microscope was then brought in. The dura was then opened in a cruciate fashion. The cisterna magna was opened to release CSF for decompression of the surgical corridor. With careful microscopic visualization, we proceeded to resect a small area of cerebellar cortical tissue that overlaid the tumor. This allowed us to work our way down to the tumor. A combination of circumferential dissection with internal debulking was used throughout the resection. The ultrasound was again used and demonstrated complete resection. We ensured there was absolute hemostasis. Once the resection was complete, we lined the tumor bed with Surgicel. The dura was then closed in a watertight fashion with the aid of a suturable dural substitute and muscle plugs. The dural closure was then tested with a valsalva to 40mmHg without leak. A synthetic polymer glue was then applied as well. The wound was thoroughly irrigated. Hemostasis was confirmed. We then proceeded to close the wound in layers using 0 Vicryl for the cervical dorsal fascia, 2-0 Vicryl for the subcutaneous tissues and 5-0 Fast gut for the skin. A clean, dry dressing was applied. The patient was then turned into a supine position, removed from the [place] headholder, allowed to wake up, extubated and transported to recovery room in stable condition. No complications were encountered. Sponge and needle count was correct at the end of this procedure.

## 2022-09-30 NOTE — TELEPHONE ENCOUNTER
Spoke with pt sister and Dr Nedra Sam message below given.   Pt verb understanding and agrees to plan

## 2022-10-03 ENCOUNTER — OFFICE VISIT (OUTPATIENT)
Dept: SURGERY | Facility: CLINIC | Age: 62
End: 2022-10-03
Payer: COMMERCIAL

## 2022-10-03 ENCOUNTER — TELEPHONE (OUTPATIENT)
Dept: SURGERY | Facility: CLINIC | Age: 62
End: 2022-10-03

## 2022-10-03 ENCOUNTER — HOSPITAL ENCOUNTER (OUTPATIENT)
Dept: CV DIAGNOSTICS | Facility: HOSPITAL | Age: 62
Discharge: HOME | End: 2022-10-03
Attending: HOSPITALIST
Payer: COMMERCIAL

## 2022-10-03 VITALS
BODY MASS INDEX: 22.19 KG/M2 | HEIGHT: 60 IN | SYSTOLIC BLOOD PRESSURE: 124 MMHG | HEART RATE: 66 BPM | WEIGHT: 113 LBS | DIASTOLIC BLOOD PRESSURE: 60 MMHG

## 2022-10-03 DIAGNOSIS — I48.91 ATRIAL FIBRILLATION AND FLUTTER (HCC): ICD-10-CM

## 2022-10-03 DIAGNOSIS — C79.31 BRAIN METASTASES (HCC): ICD-10-CM

## 2022-10-03 DIAGNOSIS — I48.92 ATRIAL FIBRILLATION AND FLUTTER (HCC): ICD-10-CM

## 2022-10-03 DIAGNOSIS — C34.92 SMALL CELL CARCINOMA OF LEFT LUNG (HCC): Primary | ICD-10-CM

## 2022-10-03 DIAGNOSIS — Z98.890 S/P CRANIOTOMY: ICD-10-CM

## 2022-10-03 PROCEDURE — 93271 ECG/MONITORING AND ANALYSIS: CPT | Performed by: HOSPITALIST

## 2022-10-03 PROCEDURE — 93270 REMOTE 30 DAY ECG REV/REPORT: CPT | Performed by: HOSPITALIST

## 2022-10-11 ENCOUNTER — TELEPHONE (OUTPATIENT)
Dept: FAMILY MEDICINE CLINIC | Facility: CLINIC | Age: 62
End: 2022-10-11

## 2022-10-11 NOTE — TELEPHONE ENCOUNTER
Spoke to patient, verified Name and . Patient notified regarding the call we received from [employer] today about the abnormal rhythm episode yesterday. Patient states that he feels dizzy and tired all the time since after hospitalization and \"After what I've gone through, I feel the same every day. \" He is unsure who his cardiologist is or if he is supposed to follow one and he has not looked at all his paperworks from recent hospitalization on  for atrial fibrillation and Cerebellar tumor, status post Crani for right cerebellar tumor resection). The 30-day event monitor was ordered by Dr. Constance Mary (IM/Hospitalist) while the patient was in the hospital. It was placed on 10/3. Dr. Ruthie Thayer.

## 2022-10-11 NOTE — TELEPHONE ENCOUNTER
Radha Gray I received a call from [facility] she is a tech at [employer]. Pt has a heart monitor and yesterday 10/10/2022 pt had a abnormal rhythm. Pt had a  rapid a Fib at 162 it did go down to 140 and today his heart rate is at 110. She will be faxing this information to your office to fax number [phone].

## 2022-10-11 NOTE — TELEPHONE ENCOUNTER
Please confirm who ordered the monitor. I did not. The ordering doc should be notified. Please call patient as well and screen for any cardiorespiratory symptoms, or lightheadedness/dizziness. Thank you.

## 2022-10-12 NOTE — TELEPHONE ENCOUNTER
Spoke to pt over phone. He does endorse intermittent heart-racing and dizziness. Again recommend ED visit asap for evaluation. Pt hesitant, but will look into finding a ride.

## 2022-10-14 ENCOUNTER — MED REC SCAN ONLY (OUTPATIENT)
Dept: FAMILY MEDICINE CLINIC | Facility: CLINIC | Age: 62
End: 2022-10-14

## 2022-10-18 NOTE — TELEPHONE ENCOUNTER
Patients sister Maida Mayfield calling. Stated she just missed a call about scheduling a F/U with Dr Melissa Roth.    Stated no name was left on VM    Return call for scheduling to go to Maida Mayfield

## 2022-10-20 NOTE — TELEPHONE ENCOUNTER
Tried to call pts sister Jan Torrez to r/s appt with Dr. Mango Ugarte. No identifying VM.  Unable to LM

## 2022-10-20 NOTE — TELEPHONE ENCOUNTER
Pat Jhony is calling because her brother would like to continue seeing Dr. Annie Marques upon her return and she would like to schedule an appointment when she return.

## 2022-10-20 NOTE — TELEPHONE ENCOUNTER
Goldy Reynoso I called the patient's sister and I left a VM to call back for a Radiation appointment Called 10/20/22

## 2022-10-21 NOTE — TELEPHONE ENCOUNTER
Pt sister Albert Rushing called back and r/s pts appt to   Future Appointments   Date Time Provider Dang Nighat   12/21/2022 11:00 AM Torsten Cuenca MD [facility] RAD ONC EMO

## 2022-10-25 DIAGNOSIS — D49.6 CEREBELLAR TUMOR (HCC): ICD-10-CM

## 2022-10-25 DIAGNOSIS — C79.31 BRAIN METASTASES (HCC): Primary | ICD-10-CM

## 2022-10-25 RX ORDER — LEVETIRACETAM 500 MG/1
TABLET ORAL
Qty: 30 TABLET | Refills: 1 | OUTPATIENT
Start: 2022-10-25

## 2022-10-25 RX ORDER — LEVETIRACETAM 500 MG/1
500 TABLET ORAL 2 TIMES DAILY
Qty: 180 TABLET | Refills: 0 | Status: SHIPPED | OUTPATIENT
Start: 2022-10-25

## 2022-10-25 NOTE — TELEPHONE ENCOUNTER
Reviewed with Jordana Burrows RN. Patient was admitted to [facility] on 9/19/22. He was started on keppra 500 mg BID. He will be following up with Dr. Dejuan Alvarez on 11/15/22 (first visit with Dr. Jed Litten). He will need a refill to get him through until his appointment. Per Dr. Dejuan Alvarez, okay to refill keppra 500 mg for 90 day supply to ensure no lapse in treatment.      keppra 500 mg, BID, #180, no refills -- e-scribed to pharmacy per Dr. Dejuan Alvarez

## 2022-11-04 ENCOUNTER — HOSPITAL ENCOUNTER (OUTPATIENT)
Dept: MRI IMAGING | Facility: HOSPITAL | Age: 62
Discharge: HOME | End: 2022-11-04
Attending: PHYSICIAN ASSISTANT
Payer: COMMERCIAL

## 2022-11-04 DIAGNOSIS — Z98.890 S/P CRANIOTOMY: ICD-10-CM

## 2022-11-04 DIAGNOSIS — C79.31 BRAIN METASTASES (HCC): ICD-10-CM

## 2022-11-04 PROCEDURE — 70553 MRI BRAIN STEM W/O & W/DYE: CPT | Performed by: PHYSICIAN ASSISTANT

## 2022-11-04 PROCEDURE — A9575 INJ GADOTERATE MEGLUMI 0.1ML: HCPCS | Performed by: PHYSICIAN ASSISTANT

## 2022-11-04 RX ORDER — DIPHENHYDRAMINE HYDROCHLORIDE 50 MG/ML
10 INJECTION, SOLUTION INTRAMUSCULAR; INTRAVENOUS
Status: COMPLETED | OUTPATIENT
Start: 2022-11-04 | End: 2022-11-04

## 2022-11-04 RX ADMIN — DIPHENHYDRAMINE HYDROCHLORIDE 10 ML: 50 INJECTION, SOLUTION INTRAMUSCULAR; INTRAVENOUS at 10:43:00

## 2022-11-09 ENCOUNTER — PATIENT MESSAGE (OUTPATIENT)
Dept: SURGERY | Facility: CLINIC | Age: 62
End: 2022-11-09

## 2022-11-09 DIAGNOSIS — D49.6 CEREBELLAR TUMOR (HCC): Primary | ICD-10-CM

## 2022-11-09 NOTE — TELEPHONE ENCOUNTER
From: Lane Dickens. To: Bessie Arndt MD  Sent: 2022 9:31 AM CST  Subject: Physical Therapy Referrall request    Would you please submit a Physical Therapy referral for Alyssa Kaufman,  ? He is feeling unbalanced when he is walking especially when her first gets up. He is S/P craniotomy of 2022. He does have a follow-up appointment with you on , but I would like to get the ball rolling since I will be coming up next week so I can take him to all his doctor's appointments and hopefully get him to PT the week of . Hopefully, the PT evaluation will be helpful to you as well. Thank-you.   Darren Mora's sister

## 2022-11-14 ENCOUNTER — TELEPHONE (OUTPATIENT)
Dept: HEMATOLOGY/ONCOLOGY | Facility: HOSPITAL | Age: 62
End: 2022-11-14

## 2022-11-14 ENCOUNTER — LAB ENCOUNTER (OUTPATIENT)
Dept: LAB | Facility: HOSPITAL | Age: 62
End: 2022-11-14
Attending: FAMILY MEDICINE
Payer: COMMERCIAL

## 2022-11-14 ENCOUNTER — HOSPITAL ENCOUNTER (EMERGENCY)
Facility: HOSPITAL | Age: 62
Discharge: STILL ADMITTED OR ADMINISTRATIVE | End: 2022-11-14
Payer: COMMERCIAL

## 2022-11-14 DIAGNOSIS — R73.9 BLOOD GLUCOSE ELEVATED: ICD-10-CM

## 2022-11-14 LAB
EST. AVERAGE GLUCOSE BLD GHB EST-MCNC: 126 MG/DL (ref 68–126)
HBA1C MFR BLD: 6 % (ref ?–5.7)

## 2022-11-14 PROCEDURE — 36415 COLL VENOUS BLD VENIPUNCTURE: CPT

## 2022-11-14 PROCEDURE — 83036 HEMOGLOBIN GLYCOSYLATED A1C: CPT

## 2022-11-14 NOTE — TELEPHONE ENCOUNTER
Dr Eris Reynaga patient - small cell lung cancer with brain mets. Completed chemoradiotherapy 4/2020. 12/20/2020 brain mets. Completed WBRT on 2/9/2021. Completed 4 cycles of carboplatin/etoposide on 4/22/2021. Crainiotomy 9/19/2022. No currently receiving systemic treatment. 2 Falls on 11/13/2022/Feeling Hot & Cold/Chest Congestion with Productive Cough    2 Falls on 11/133/2022 (Patient was up on a step ladder. He felt dizzy and fell landing on his left hip. He denies hitting his head or losing consciousness. He was in his bedroom later in the day and he hit his bed, felt dizzy and fell landing on his left side. He again denies hitting his head or passing out.)  Feeling Hot & Cold/Chest Congestion with Productive Cough: (Patient has been feeling hot and cold. No thermometer to check for fever. He has chest congestion and a productive cough.)    I explained to Lv Jenkins and his sister Leah Castro that he should be evaluated in the [facility] ER. Both verbalized understanding. Lv Jenkins said he will think about going. I called report to the ER. ETA 1 hr.

## 2022-11-14 NOTE — TELEPHONE ENCOUNTER
Sister calling says valentin is dizzy,stumbling falling, fell yesterday. Cold then hot. N/a to take temp. No other issues.  Elba Davis

## 2022-11-15 ENCOUNTER — OFFICE VISIT (OUTPATIENT)
Dept: NEUROLOGY | Facility: CLINIC | Age: 62
End: 2022-11-15
Payer: COMMERCIAL

## 2022-11-15 ENCOUNTER — APPOINTMENT (OUTPATIENT)
Dept: RADIATION ONCOLOGY | Facility: HOSPITAL | Age: 62
End: 2022-11-15
Attending: INTERNAL MEDICINE
Payer: COMMERCIAL

## 2022-11-15 ENCOUNTER — TELEPHONE (OUTPATIENT)
Dept: PHYSICAL THERAPY | Facility: HOSPITAL | Age: 62
End: 2022-11-15

## 2022-11-15 VITALS
DIASTOLIC BLOOD PRESSURE: 62 MMHG | WEIGHT: 121 LBS | HEIGHT: 60 IN | SYSTOLIC BLOOD PRESSURE: 118 MMHG | BODY MASS INDEX: 23.75 KG/M2

## 2022-11-15 DIAGNOSIS — D49.6 CEREBELLAR TUMOR (HCC): ICD-10-CM

## 2022-11-15 DIAGNOSIS — R56.9 SEIZURE (HCC): Primary | ICD-10-CM

## 2022-11-15 RX ORDER — DIVALPROEX SODIUM 250 MG/1
750 TABLET, EXTENDED RELEASE ORAL DAILY
Qty: 90 TABLET | Refills: 5 | Status: SHIPPED | OUTPATIENT
Start: 2022-11-15

## 2022-11-16 ENCOUNTER — TELEPHONE (OUTPATIENT)
Dept: PHYSICAL THERAPY | Facility: HOSPITAL | Age: 62
End: 2022-11-16

## 2022-11-17 ENCOUNTER — OFFICE VISIT (OUTPATIENT)
Dept: PHYSICAL THERAPY | Facility: HOSPITAL | Age: 62
End: 2022-11-17
Attending: FAMILY MEDICINE
Payer: COMMERCIAL

## 2022-11-17 DIAGNOSIS — D49.6 CEREBELLAR TUMOR (HCC): ICD-10-CM

## 2022-11-17 PROCEDURE — 97163 PT EVAL HIGH COMPLEX 45 MIN: CPT

## 2022-11-18 ENCOUNTER — OFFICE VISIT (OUTPATIENT)
Dept: SURGERY | Facility: CLINIC | Age: 62
End: 2022-11-18
Payer: COMMERCIAL

## 2022-11-18 VITALS
BODY MASS INDEX: 23.75 KG/M2 | HEART RATE: 68 BPM | OXYGEN SATURATION: 98 % | HEIGHT: 60 IN | SYSTOLIC BLOOD PRESSURE: 108 MMHG | WEIGHT: 121 LBS | DIASTOLIC BLOOD PRESSURE: 70 MMHG

## 2022-11-18 DIAGNOSIS — D49.6 CEREBELLAR TUMOR (HCC): Primary | ICD-10-CM

## 2022-11-18 PROCEDURE — 3074F SYST BP LT 130 MM HG: CPT | Performed by: NEUROLOGICAL SURGERY

## 2022-11-18 PROCEDURE — 3078F DIAST BP <80 MM HG: CPT | Performed by: NEUROLOGICAL SURGERY

## 2022-11-18 PROCEDURE — 99024 POSTOP FOLLOW-UP VISIT: CPT | Performed by: NEUROLOGICAL SURGERY

## 2022-11-18 PROCEDURE — 3008F BODY MASS INDEX DOCD: CPT | Performed by: NEUROLOGICAL SURGERY

## 2022-11-21 ENCOUNTER — APPOINTMENT (OUTPATIENT)
Dept: PHYSICAL THERAPY | Facility: HOSPITAL | Age: 62
End: 2022-11-21
Attending: Other
Payer: COMMERCIAL

## 2022-11-23 ENCOUNTER — APPOINTMENT (OUTPATIENT)
Dept: PHYSICAL THERAPY | Facility: HOSPITAL | Age: 62
End: 2022-11-23
Attending: Other
Payer: COMMERCIAL

## 2022-11-23 ENCOUNTER — TELEPHONE (OUTPATIENT)
Dept: PHYSICAL THERAPY | Facility: HOSPITAL | Age: 62
End: 2022-11-23

## 2022-11-23 ENCOUNTER — TELEPHONE (OUTPATIENT)
Dept: SURGERY | Facility: CLINIC | Age: 62
End: 2022-11-23

## 2022-11-28 ENCOUNTER — OFFICE VISIT (OUTPATIENT)
Dept: PHYSICAL THERAPY | Facility: HOSPITAL | Age: 62
End: 2022-11-28
Attending: Other
Payer: COMMERCIAL

## 2022-11-28 PROCEDURE — 97112 NEUROMUSCULAR REEDUCATION: CPT

## 2022-11-28 NOTE — PATIENT INSTRUCTIONS
Do these exercises 1-2 times each day    Stand with bed or couch behind you for safety and hold a target (thumb up) at arms length in front of your eyes. Keep eyes focused on thumbnail. Turn eyes, head and target together left and right. Do 10 turns, then repeat in up and down direction. Stand with bed our couch behind you for safety. Stand tall, arms at sides. Turn head and shoulders to the right, looking over your shoulder. Return to the center and repeat to the left. Do 5 times each direction. Stand next to countertop for safety. Start with feet close together, then slide one foot forward. The heel of one foot will be even with the big toe of the other foot. Cross arms and balance with eyes open for 30 seconds, practice with each foot in front. Stand tall at the countertop and hold on lightly. Lift  one leg forward, then return foot to floor, lift leg out to the side then return foot to floor, and then backward, keep leg straight, and balancing. Do 5 times in each direction, then repeat on the other leg. Sit up tall. March one knee up as you lift the opposite arm. Lower them together than repeat with the other arm and leg. Alternate until you have done 15 on each side (30 total).

## 2022-11-28 NOTE — PROGRESS NOTES
Diagnosis: cerebellar tumor, imbalance, dizziness   Visit (# authorized):   8 per POC ([employer] POS)                       Referring Physician: Harish Painter    Precautions: cancer, at risk of falls     Medication changes since last visit?:  No    Subjective: Missed last visit due to transportation issues. Has been riding bike in home and doing some exercises on own. Objective:    Date  11/28          Visit Number  2          NMR x          Ther EX           Ther Act           Gait Training           CRM            Manual             Additional Treatment Information:    NMR (39 mins):  Nu-step level 5 x10 mins -motor priming  VOR cxl in standing - demo with return demo- x10 ea horizontal and vertical, VC's for proper sequence/performance   Upper trunk rotation x5 B- demo with return demo   Modified tandem EO 30 sec ea foot in front -demo with return demo   3 kicks 1 UE support - demo with return demo- x5 B ea - VC' s for proper sequence   Seated march with coordinated UE \"Swing\" x15 B - demo with return demo- VC's and tactile cues initially   Rebounder 2#   Pt selected NINA x15    Romberg x15     4 square step test: 20 seconds       Patient Education:  HEP initiated and issued, see pt instructions       Assessment:   Demo's understanding of HEP provided by end of session though may benefit from review next session to ensure proper carryover.      Plan: in future sessions: tracing \"t\", ambulation with head turns, PNF, foam     Charges: 3 NMR       Total Timed Treatment: 39 min  Total Treatment Time: 39 min

## 2022-11-30 ENCOUNTER — APPOINTMENT (OUTPATIENT)
Dept: PHYSICAL THERAPY | Facility: HOSPITAL | Age: 62
End: 2022-11-30
Attending: Other
Payer: COMMERCIAL

## 2022-12-06 ENCOUNTER — OFFICE VISIT (OUTPATIENT)
Dept: PHYSICAL THERAPY | Facility: HOSPITAL | Age: 62
End: 2022-12-06
Attending: Other
Payer: COMMERCIAL

## 2022-12-06 PROCEDURE — 97112 NEUROMUSCULAR REEDUCATION: CPT

## 2022-12-06 NOTE — PROGRESS NOTES
Diagnosis: cerebellar tumor, imbalance, dizziness   Visit (# authorized):   8 per POC ([employer] POS)                       Referring Physician: Ashlyn Hidalgo    Precautions: cancer, at risk of falls     Medication changes since last visit?:  No    Subjective: Reports good days and bad. Some confusion with home exercises provided last session. Objective:  Arrived late to session- difficulty finding parking   Date  11/28 12/6         Visit Number  2 3         NMR x x         Ther EX           Ther Act           Gait Training           CRM            Manual             Additional Treatment Information:    NMR (38 mins):  Nu-step level 5 x10 mins -motor priming  VOR cxl in standing - demo with return demo- x10 ea horizontal and vertical, VC's for proper sequence/performance   Upper trunk rotation x5 B- demo with return demo   Modified tandem EO 30 sec ea foot in front -demo with return demo   3 kicks 1 UE support - demo with return demo- x5 B ea - VC' s for proper sequence   Seated march with coordinated UE \"Swing\" x15 B - demo with return demo- VC's and tactile cues as needed  Rebounder 2#: step stance x15 ea foot in front  Tap ups cup (focus on control) x15 B -->alt x15 B   Ambulation with head turns 60 ft ea horizontal and vertical       Patient Education:  HEP reviewed in depth with patient today. Assessment:   Again, may benefit from review of HEP next session. Generally good postural stability throughout. Challenged by coordination task.      Plan: in future sessions: tracing \"t\", ambulation with head turns, PNF, foam     Charges: 3 NMR       Total Timed Treatment: 38 min  Total Treatment Time: 38 min

## 2022-12-07 ENCOUNTER — OFFICE VISIT (OUTPATIENT)
Dept: RADIATION ONCOLOGY | Facility: HOSPITAL | Age: 62
End: 2022-12-07
Attending: INTERNAL MEDICINE
Payer: COMMERCIAL

## 2022-12-07 VITALS
RESPIRATION RATE: 16 BRPM | HEART RATE: 82 BPM | WEIGHT: 123 LBS | TEMPERATURE: 98 F | DIASTOLIC BLOOD PRESSURE: 67 MMHG | BODY MASS INDEX: 24 KG/M2 | OXYGEN SATURATION: 100 % | SYSTOLIC BLOOD PRESSURE: 114 MMHG

## 2022-12-07 DIAGNOSIS — C79.31 BRAIN METASTASIS (HCC): Primary | ICD-10-CM

## 2022-12-07 PROCEDURE — 99211 OFF/OP EST MAY X REQ PHY/QHP: CPT

## 2022-12-07 NOTE — PATIENT INSTRUCTIONS
Follow up with Dr. Brock Cotton in early January, after your repeat brain MRI. Agustín Riley will call you to schedule your follow up appointment. Please call [phone] with any radiation questions. Schedule your MRI prior to your follow up. Call [phone] to schedule.

## 2022-12-07 NOTE — PROGRESS NOTES
Nursing Follow-Up Note    Patient: Shailesh Morales. YOB: 1960  Age: 57year old  Radiation Oncologist: Dr. Clover Nelson  Referring Physician: No ref. provider found  Chief Complaint: Patient presents with:  Radiation    Date: 12/7/2022    Toxicities: Headache: denies   Blurred vision: denies  Gait: steady  Fatigue: mild, however still exercising often  Dizziness: experiencing dizziness intermittently, working with PT    Vital Signs: There were no vitals taken for this visit., Wt Readings from Last 6 Encounters:  11/18/22 : 54.9 kg (121 lb)  11/15/22 : 54.9 kg (121 lb)  10/03/22 : 51.3 kg (113 lb)  09/28/22 : 51.3 kg (113 lb)  09/20/22 : 49.4 kg (108 lb 14.4 oz)  09/14/22 : 53.5 kg (118 lb)      Allergies:  No Known Allergies    Nursing Note: Pt seen for follow up with Dr. Marixa Arana. Pt s/p craniectomy on 9/19/2022. Pt completed brain MRI on 11/4/2022, Dr. Marixa Arana to review with patient. VSS, pt denies pain today. Pt does report some mild fatigue, however still exercising. Steady gait noted. Does experience dizziness-working with PT. Dizziness intermittent, some days good and some days bad. Dr. Marixa Arana ordered brain MRI, to be scheduled for early January 2023. Pt to follow up with Dr. Marixa Arana following scan.

## 2022-12-08 ENCOUNTER — APPOINTMENT (OUTPATIENT)
Dept: PHYSICAL THERAPY | Facility: HOSPITAL | Age: 62
End: 2022-12-08
Attending: Other
Payer: COMMERCIAL

## 2022-12-08 ENCOUNTER — TELEPHONE (OUTPATIENT)
Dept: PHYSICAL THERAPY | Facility: HOSPITAL | Age: 62
End: 2022-12-08

## 2022-12-12 ENCOUNTER — APPOINTMENT (OUTPATIENT)
Dept: PHYSICAL THERAPY | Facility: HOSPITAL | Age: 62
End: 2022-12-12
Attending: Other
Payer: COMMERCIAL

## 2022-12-13 ENCOUNTER — APPOINTMENT (OUTPATIENT)
Dept: PHYSICAL THERAPY | Facility: HOSPITAL | Age: 62
End: 2022-12-13
Attending: Other
Payer: COMMERCIAL

## 2022-12-14 ENCOUNTER — APPOINTMENT (OUTPATIENT)
Dept: PHYSICAL THERAPY | Facility: HOSPITAL | Age: 62
End: 2022-12-14
Attending: Other
Payer: COMMERCIAL

## 2022-12-15 ENCOUNTER — OFFICE VISIT (OUTPATIENT)
Dept: PHYSICAL THERAPY | Facility: HOSPITAL | Age: 62
End: 2022-12-15
Attending: Other
Payer: COMMERCIAL

## 2022-12-15 ENCOUNTER — HOSPITAL ENCOUNTER (OUTPATIENT)
Dept: CT IMAGING | Facility: HOSPITAL | Age: 62
Discharge: HOME | End: 2022-12-15
Attending: INTERNAL MEDICINE
Payer: COMMERCIAL

## 2022-12-15 DIAGNOSIS — C34.92 SMALL CELL CARCINOMA OF LEFT LUNG (HCC): ICD-10-CM

## 2022-12-15 DIAGNOSIS — Z92.21 HISTORY OF CHEMOTHERAPY: ICD-10-CM

## 2022-12-15 LAB
CREAT BLD-MCNC: 0.9 MG/DL
GFR SERPLBLD BASED ON 1.73 SQ M-ARVRAT: 95 ML/MIN/1.73M2 (ref 60–?)

## 2022-12-15 PROCEDURE — 71260 CT THORAX DX C+: CPT | Performed by: INTERNAL MEDICINE

## 2022-12-15 PROCEDURE — 82565 ASSAY OF CREATININE: CPT

## 2022-12-15 PROCEDURE — 97112 NEUROMUSCULAR REEDUCATION: CPT

## 2022-12-15 PROCEDURE — 74177 CT ABD & PELVIS W/CONTRAST: CPT | Performed by: INTERNAL MEDICINE

## 2022-12-15 NOTE — PROGRESS NOTES
Diagnosis: cerebellar tumor, imbalance, dizziness   Visit (# authorized):   8 per POC ([employer] POS)                       Referring Physician: Dameon Samuels    Precautions: cancer, at risk of falls     Medication changes since last visit?:  No    Subjective: Missed last session due to getting his days mixed up. Objective:    Date  11/28 12/6 12/15        Visit Number  2 3 4        NMR x x x        Ther EX           Ther Act           Gait Training           CRM            Manual             Additional Treatment Information:    NMR (40 mins):  Nu-step level 5 x10 mins -motor priming  Shuttle    B press wobble disc(2 blue, yellow) x15    SL press wobble disc (blue, yellow) x15    Alt march foot to \"x\" -coordination x20 B  Rebounder 4#   Upper trunk rotation throw/catch x10 B (+dizziness)   Step stance - x15 ea foot in front   VOR x1 with checkerboard horizontal (+dizziness) and vertical (+dizziness)- demo with return demo     Patient Education:  VOR x1 added to HEP- see pt instructions. Assessment:   Doing well overall. Good postural stability throughout.      Plan: in future sessions: tracing \"t\", ambulation with head turns, PNF, foam     Charges: 3 NMR       Total Timed Treatment: 40 min  Total Treatment Time: 40 min

## 2022-12-15 NOTE — PATIENT INSTRUCTIONS
Do these exercises 1-2 times each day    Stand with bed or couch behind you for safety and hold a target (thumb up) at arms length in front of your eyes. Keep eyes focused on thumbnail. Turn eyes, head and target together left and right. Do 10 turns, then repeat in up and down direction. Stand with bed our couch behind you for safety. Stand tall, arms at sides. Turn head and shoulders to the right, looking over your shoulder. Return to the center and repeat to the left. Do 5 times each direction. Stand next to countertop for safety. Start with feet close together, then slide one foot forward. The heel of one foot will be even with the big toe of the other foot. Cross arms and balance with eyes open for 30 seconds, practice with each foot in front. Stand tall at the countertop and hold on lightly. Lift  one leg forward, then return foot to floor, lift leg out to the side then return foot to floor, and then backward, keep leg straight, and balancing. Do 5 times in each direction, then repeat on the other leg. Sit up tall. March one knee up as you lift the opposite arm. Lower them together than repeat with the other arm and leg. Alternate until you have done 15 on each side (30 total). Stand tall, arms length away from target, placed on the wall at eye level. Turn head side to side like you are saying \"no\",  while keeping eyes focused on the target. Make sure that your head movements are small but as fast as you can tolerate. Keep the target clear as you turn- do not let the target blur or double. Turn your head for 30-60 seconds. If symptoms start to elevate past 5/10, do not stop head movement but slow down. Repeat with up and down head movements like you are saying \"yes\".

## 2022-12-16 ENCOUNTER — OFFICE VISIT (OUTPATIENT)
Dept: HEMATOLOGY/ONCOLOGY | Facility: HOSPITAL | Age: 62
End: 2022-12-16
Attending: INTERNAL MEDICINE
Payer: COMMERCIAL

## 2022-12-16 VITALS
BODY MASS INDEX: 24.38 KG/M2 | HEART RATE: 73 BPM | HEIGHT: 60 IN | DIASTOLIC BLOOD PRESSURE: 60 MMHG | OXYGEN SATURATION: 97 % | TEMPERATURE: 99 F | WEIGHT: 124.19 LBS | RESPIRATION RATE: 16 BRPM | SYSTOLIC BLOOD PRESSURE: 108 MMHG

## 2022-12-16 DIAGNOSIS — Z92.21 HISTORY OF CHEMOTHERAPY: ICD-10-CM

## 2022-12-16 DIAGNOSIS — C79.31 BRAIN METASTASES (HCC): ICD-10-CM

## 2022-12-16 DIAGNOSIS — C34.92 SMALL CELL CARCINOMA OF LEFT LUNG (HCC): Primary | ICD-10-CM

## 2022-12-16 DIAGNOSIS — E03.9 HYPOTHYROIDISM (ACQUIRED): ICD-10-CM

## 2022-12-16 DIAGNOSIS — C34.92 SMALL CELL CARCINOMA OF LEFT LUNG (HCC): ICD-10-CM

## 2022-12-16 DIAGNOSIS — Z45.2 ENCOUNTER FOR CARE RELATED TO VASCULAR ACCESS PORT: Primary | ICD-10-CM

## 2022-12-16 LAB
ALBUMIN SERPL-MCNC: 3.3 G/DL (ref 3.4–5)
ALBUMIN/GLOB SERPL: 1 {RATIO} (ref 1–2)
ALP LIVER SERPL-CCNC: 55 U/L
ALT SERPL-CCNC: 21 U/L
ANION GAP SERPL CALC-SCNC: 4 MMOL/L (ref 0–18)
AST SERPL-CCNC: 11 U/L (ref 15–37)
BASOPHILS # BLD AUTO: 0.07 X10(3) UL (ref 0–0.2)
BASOPHILS NFR BLD AUTO: 1.1 %
BILIRUB SERPL-MCNC: 0.4 MG/DL (ref 0.1–2)
BUN BLD-MCNC: 20 MG/DL (ref 7–18)
BUN/CREAT SERPL: 21.7 (ref 10–20)
CALCIUM BLD-MCNC: 9.1 MG/DL (ref 8.5–10.1)
CHLORIDE SERPL-SCNC: 106 MMOL/L (ref 98–112)
CO2 SERPL-SCNC: 33 MMOL/L (ref 21–32)
CREAT BLD-MCNC: 0.92 MG/DL
DEPRECATED RDW RBC AUTO: 51 FL (ref 35.1–46.3)
EOSINOPHIL # BLD AUTO: 0.15 X10(3) UL (ref 0–0.7)
EOSINOPHIL NFR BLD AUTO: 2.3 %
ERYTHROCYTE [DISTWIDTH] IN BLOOD BY AUTOMATED COUNT: 14.3 % (ref 11–15)
GFR SERPLBLD BASED ON 1.73 SQ M-ARVRAT: 93 ML/MIN/1.73M2 (ref 60–?)
GLOBULIN PLAS-MCNC: 3.3 G/DL (ref 2.8–4.4)
GLUCOSE BLD-MCNC: 168 MG/DL (ref 70–99)
HCT VFR BLD AUTO: 41.2 %
HGB BLD-MCNC: 13.1 G/DL
IMM GRANULOCYTES # BLD AUTO: 0.1 X10(3) UL (ref 0–1)
IMM GRANULOCYTES NFR BLD: 1.5 %
LYMPHOCYTES # BLD AUTO: 1.02 X10(3) UL (ref 1–4)
LYMPHOCYTES NFR BLD AUTO: 15.6 %
MCH RBC QN AUTO: 31 PG (ref 26–34)
MCHC RBC AUTO-ENTMCNC: 31.8 G/DL (ref 31–37)
MCV RBC AUTO: 97.6 FL
MONOCYTES # BLD AUTO: 0.73 X10(3) UL (ref 0.1–1)
MONOCYTES NFR BLD AUTO: 11.2 %
NEUTROPHILS # BLD AUTO: 4.47 X10 (3) UL (ref 1.5–7.7)
NEUTROPHILS # BLD AUTO: 4.47 X10(3) UL (ref 1.5–7.7)
NEUTROPHILS NFR BLD AUTO: 68.3 %
OSMOLALITY SERPL CALC.SUM OF ELEC: 302 MOSM/KG (ref 275–295)
PLATELET # BLD AUTO: 173 10(3)UL (ref 150–450)
POTASSIUM SERPL-SCNC: 4.1 MMOL/L (ref 3.5–5.1)
PROT SERPL-MCNC: 6.6 G/DL (ref 6.4–8.2)
RBC # BLD AUTO: 4.22 X10(6)UL
SODIUM SERPL-SCNC: 143 MMOL/L (ref 136–145)
TSI SER-ACNC: 1.49 MIU/ML (ref 0.36–3.74)
WBC # BLD AUTO: 6.5 X10(3) UL (ref 4–11)

## 2022-12-16 PROCEDURE — 84443 ASSAY THYROID STIM HORMONE: CPT

## 2022-12-16 PROCEDURE — 80053 COMPREHEN METABOLIC PANEL: CPT

## 2022-12-16 PROCEDURE — 85025 COMPLETE CBC W/AUTO DIFF WBC: CPT

## 2022-12-16 PROCEDURE — 36591 DRAW BLOOD OFF VENOUS DEVICE: CPT

## 2022-12-16 PROCEDURE — 99214 OFFICE O/P EST MOD 30 MIN: CPT | Performed by: INTERNAL MEDICINE

## 2022-12-16 RX ORDER — HEPARIN SODIUM (PORCINE) LOCK FLUSH IV SOLN 100 UNIT/ML 100 UNIT/ML
5 SOLUTION INTRAVENOUS ONCE
OUTPATIENT
Start: 2022-12-16

## 2022-12-16 RX ORDER — HEPARIN SODIUM (PORCINE) LOCK FLUSH IV SOLN 100 UNIT/ML 100 UNIT/ML
5 SOLUTION INTRAVENOUS ONCE
Status: COMPLETED | OUTPATIENT
Start: 2022-12-16 | End: 2022-12-16

## 2022-12-16 RX ORDER — 0.9 % SODIUM CHLORIDE 0.9 %
10 VIAL (ML) INJECTION ONCE
OUTPATIENT
Start: 2022-12-16

## 2022-12-16 RX ADMIN — HEPARIN SODIUM (PORCINE) LOCK FLUSH IV SOLN 100 UNIT/ML 500 UNITS: 100 SOLUTION INTRAVENOUS at 11:20:00

## 2022-12-17 ENCOUNTER — PATIENT MESSAGE (OUTPATIENT)
Dept: SURGERY | Facility: CLINIC | Age: 62
End: 2022-12-17

## 2022-12-19 NOTE — TELEPHONE ENCOUNTER
From: Vance De Leon. To: Jerrica June MD  Sent: 12/17/2022 2:43 PM CST  Subject: MRI    There is still an appointment in the system for a MRI of the brain for Yamilet Gonzalez on Jan 4. I think this may be the old appointment before we rescheduled to an earlier date, but I want to make sure that you didn't want another MRI before we cancel it. Please verify for us. Thanks.

## 2022-12-20 ENCOUNTER — OFFICE VISIT (OUTPATIENT)
Dept: PHYSICAL THERAPY | Facility: HOSPITAL | Age: 62
End: 2022-12-20
Attending: Other
Payer: COMMERCIAL

## 2022-12-20 PROCEDURE — 97112 NEUROMUSCULAR REEDUCATION: CPT

## 2022-12-20 NOTE — PROGRESS NOTES
Diagnosis: cerebellar tumor, imbalance, dizziness   Visit (# authorized):   8 per POC ([employer] POS)                       Referring Physician: Darien Willoughby    Precautions: cancer, at risk of falls     Medication changes since last visit?:  No    Subjective: No new information to report. Objective:    Date  11/28 12/6 12/15 12/20       Visit Number  2 3 4 5       NMR x x x x       Ther EX           Ther Act           Gait Training           CRM            Manual             Additional Treatment Information:    NMR (41 mins):  Nu-step level 5 x10 mins -motor priming  Shuttle    B press wobble disc(2 blue, yellow) x20   SL press wobble disc (blue, silver) x20    Alt march foot to \"x\" -coordination x20 B  Ball roll up wall 4# (eyes and head follow) x10   PNF lifts with trunk rotation to ball touch to wall x10 B (+dizziness)   Ball agility task (repetitive bending over<>standing up) -6, demo with return demo (+dizziness)   D2 flexion red TB with trunk rotation eyes and head follow x10 B   VOR x1 with checkerboard horizontal (+dizziness) and vertical (+dizziness)- demo with return demo     Patient Education:  Encouraged continued performance of HEP    Assessment:   Progressing well. Sx's returning to baseline quickly. Generally minimal to no sx's of dizziness throughout. Plan: in future sessions: tracing \"t\", ambulation with head turns, continue PNF, foam, ball toss? ?     Charges: 3 NMR       Total Timed Treatment: 41 min  Total Treatment Time: 42 min

## 2022-12-21 ENCOUNTER — APPOINTMENT (OUTPATIENT)
Dept: RADIATION ONCOLOGY | Facility: HOSPITAL | Age: 62
End: 2022-12-21
Attending: INTERNAL MEDICINE
Payer: COMMERCIAL

## 2022-12-22 ENCOUNTER — OFFICE VISIT (OUTPATIENT)
Dept: PHYSICAL THERAPY | Facility: HOSPITAL | Age: 62
End: 2022-12-22
Attending: Other
Payer: COMMERCIAL

## 2022-12-22 PROCEDURE — 97112 NEUROMUSCULAR REEDUCATION: CPT

## 2022-12-22 NOTE — PROGRESS NOTES
Diagnosis: cerebellar tumor, imbalance, dizziness   Visit (# authorized):   8 per POC ([employer] POS)                       Referring Physician: Cheryl Rider    Precautions: cancer, at risk of falls     Medication changes since last visit?:  No    Subjective: Pt reports falling yesterday- did not injure himself. Walking out of his kitchen he suddenly became very dizzy with sensation of room spinning- states he was walking looking straight ahead. Sensation did not last long but caused him to lose his balance. Objective:    Date  11/28 12/6 12/15 12/20 12/22      Visit Number  2 3 4 5 6      NMR x x x x x      Ther EX           Ther Act           Gait Training           CRM            Manual             Additional Treatment Information:    NMR (40 mins):  Nu-step level 5 x10 mins -motor priming  Seated tracing \"t\" on floor with toe -x20 B  Ball taps on wall to target in various patterns and on command-multiple bouts (+dizziness)  Ball agility task (repetitive bending over<>standing up) -8, demo with return demo (+dizziness)   Rebounder 4#- modified tandem x15 ea foot in front   Tap ups 8 in step x15 B -SBA, CGA for balance checks    Patient Education:  Encouraged continued performance of HEP    Assessment:   Dizziness with vertical head movements and bending over primarily. Challenged by tap up task today requiring CGA for balance checks. Plan: in future sessions: ambulation with head turns, continue PNF, foam, ball toss? ?     Charges: 3 NMR       Total Timed Treatment: 40 min  Total Treatment Time: 40 min

## 2023-01-01 ENCOUNTER — APPOINTMENT (OUTPATIENT)
Dept: PHYSICAL THERAPY | Facility: HOSPITAL | Age: 63
End: 2023-01-01
Attending: Other
Payer: COMMERCIAL

## 2023-01-01 ENCOUNTER — APPOINTMENT (OUTPATIENT)
Dept: RADIATION ONCOLOGY | Facility: HOSPITAL | Age: 63
End: 2023-01-01
Attending: INTERNAL MEDICINE
Payer: COMMERCIAL

## 2023-01-03 ENCOUNTER — TELEPHONE (OUTPATIENT)
Dept: PHYSICAL THERAPY | Facility: HOSPITAL | Age: 63
End: 2023-01-03

## 2023-01-04 ENCOUNTER — TELEPHONE (OUTPATIENT)
Dept: RADIATION ONCOLOGY | Facility: HOSPITAL | Age: 63
End: 2023-01-04

## 2023-01-04 NOTE — TELEPHONE ENCOUNTER
LVM for call back. Patient approved for MRI. Pt will need to call and schedule. Dr. Cedric Krishnamurthy follow up to be arranged after MRI. RN number provided in VM to discuss plan.

## 2023-01-05 ENCOUNTER — TELEPHONE (OUTPATIENT)
Dept: RADIATION ONCOLOGY | Facility: HOSPITAL | Age: 63
End: 2023-01-05

## 2023-01-05 NOTE — TELEPHONE ENCOUNTER
Called patient again today to discuss the information below. LVM with RN number for a call back. Patient approved for MRI. Pt will need to call and schedule. Dr. Rikki Bañuelos follow up to be arranged after MRI.

## 2023-01-05 NOTE — TELEPHONE ENCOUNTER
Pt called back. Aware MRI is approved so he can call and schedule, states he does already have the number for central scheduling. Aware he will get a call from [facility] to schedule a follow up with Dr. Carlotta Hodgkins after the MRI. No questions at this time.

## 2023-01-13 ENCOUNTER — TELEPHONE (OUTPATIENT)
Dept: RADIATION ONCOLOGY | Facility: HOSPITAL | Age: 63
End: 2023-01-13

## 2023-01-13 NOTE — TELEPHONE ENCOUNTER
LVM for patients sister. Hoping she can assist patient with scheduling his overdue brain MRI. I've discussed scheduling MRI with patients a few times, hasn't be scheduled yet. Patient to follow up with Dr. Mani Reese once complete. RN number provided in  for a call back.

## 2023-01-16 ENCOUNTER — TELEPHONE (OUTPATIENT)
Dept: RADIATION ONCOLOGY | Facility: HOSPITAL | Age: 63
End: 2023-01-16

## 2023-01-16 RX ORDER — METHOCARBAMOL 500 MG/1
TABLET, FILM COATED ORAL
Qty: 30 TABLET | Refills: 0 | OUTPATIENT
Start: 2023-01-16

## 2023-01-16 NOTE — TELEPHONE ENCOUNTER
Dr. Kenny Coates recommends his PCP discuss any ongoing need of muscle relaxants with the patient and regarding his request for methocarbamol refill

## 2023-01-17 ENCOUNTER — PATIENT MESSAGE (OUTPATIENT)
Dept: NEUROLOGY | Facility: CLINIC | Age: 63
End: 2023-01-17

## 2023-01-18 ENCOUNTER — TELEPHONE (OUTPATIENT)
Dept: RADIATION ONCOLOGY | Facility: HOSPITAL | Age: 63
End: 2023-01-18

## 2023-01-18 ENCOUNTER — DOCUMENTATION ONLY (OUTPATIENT)
Dept: RADIATION ONCOLOGY | Facility: HOSPITAL | Age: 63
End: 2023-01-18

## 2023-01-18 ENCOUNTER — SOCIAL WORK SERVICES (OUTPATIENT)
Dept: HEMATOLOGY/ONCOLOGY | Facility: HOSPITAL | Age: 63
End: 2023-01-18

## 2023-01-18 NOTE — TELEPHONE ENCOUNTER
RN attempted to call patient. Sent straight to , however  box is full so unable to LVM for patient. RN called Glenny-patients sister.     -Andre Alberto had messaged Neuro last night regarding dizziness, confusion, and weakness. Discussed with Dr. Anneliese Wiseman and recommended he be evaluated at ED. Sister states she is out of state and unable to get him to ED currently. No friends or family nearby to get him to ED. Discussed calling 911 and having them go to his house for evaluation. Sister states she will do so. -Also discussed with Glenny that Brain MRI is ordered by Dr. Anneliese Wiseman. Has discussed previously with patient multiple times, however not scheduled. Sister states that she does have the number to schedule. Glenny hesitant to schedule d/t patient being unable to drive. SW to be contacted to look into transportation for appointments. SW to call Dustin Passer with an update. Glenny very appreciative of the call. Given RN number. Dr. Anneliese Wiseman updated.

## 2023-01-18 NOTE — PROGRESS NOTES
ELLEN called and spoke to patient sister Vannesa Gerard regarding transportation resources for patient to get to and from appointments. ELLEN discussed the [employer]. Glenny requested that fact sheet for the courtesy Lindsay Candelaria to be sent to patient's [facility], in which she has access to. Vannesa Gerard also voiced her concern about patient being difficult to reach and she mentioned that he seemed off the last time she spoke to him. She states patient could not remember what he ate or things he had just done an hour prior. Warden Amaro is interested in resources that will assist her in keeping a watchful eye on him because she lives in [place]. ELLEN briefly discussed private duty caregiver companies that offer drop in services where the caregiver just comes 15 minutes here an there just to check on individuals who live alone. Vannesa Gerard seemed interested in hiring a service like this as long as patient is willing. ELLEN offered as a last resort that the local police could be notified and asked to do a wellness check. Vannesa Gerard was appreciative of all the information and thanked writer for calling. Social Work will remain available for assessment, education and discharge planning as needed.

## 2023-01-18 NOTE — TELEPHONE ENCOUNTER
From: Magnus Samuels. To: Will Manzano MD  Sent: 1/17/2023 6:40 PM CST  Subject: Side effects from Depakote    I see Alicia Nj missed his appointment with you on Jan 12. I have finally been able to get ahold of him myself and it seems like he has been increasingly dizzy, now complaining of being dizzy all the time and weak. He seems confused and can't remember recent events, even if he has been eating or when he last ate. I was wondering if there is another seizure medicine that could be tried. When he was without confusion when I talked with him last week, he had not had any seizures. I did reschedule his appointment with you (first available was Feb 22) but I wondered if you could change his seizure medication in the meantime. He refuses to go to the ER.      Thanks  Darren Thomas's sister

## 2023-01-18 NOTE — PROGRESS NOTES
We have contacted pt several times to schedule his follow-up brain MRI but this has not been done. His sister messaged neurology stating that [place] has been increasingly weak, dizzy, and confused. We tried to call pt today but it went straight to . We reached his sister Harmony Rosario and recommended ER evaluation. Unfortunately she is out of town and is also having trouble reaching [place]. We suggested having a friend check on him or calling 911. She thinks he might refuse to let paramedics into his house, but she will try to reach him again or call 911. Will continue to follow closely.

## 2023-01-19 ENCOUNTER — TELEPHONE (OUTPATIENT)
Dept: RADIATION ONCOLOGY | Facility: HOSPITAL | Age: 63
End: 2023-01-19

## 2023-01-19 DIAGNOSIS — R56.9 SEIZURE (HCC): Primary | ICD-10-CM

## 2023-01-19 NOTE — TELEPHONE ENCOUNTER
RN called Dulce Ferreira, pt sister, for an update following yesterdays conversation. Dulce Ferreira states police arrived at [facility] today for a wellness check, found him appropriate and not in need of an emergency evaluation. Dulce Ferreira has been unable to reach pt, continues to text/call. Pt missed PT appointment today. Dulce Ferreira believes a cause of this change is his new seizure medication, she states he has not been the same since starting. Has reached out to Neuro to discuss. Dr. Eunice Harley updated on today's conversation. Asked Dulce Ferreira to please keep us updated on any changes, she states her understanding. Glenny very appreciative of the call.

## 2023-01-23 ENCOUNTER — APPOINTMENT (OUTPATIENT)
Dept: PHYSICAL THERAPY | Facility: HOSPITAL | Age: 63
End: 2023-01-23
Attending: Other
Payer: COMMERCIAL

## 2023-01-24 ENCOUNTER — APPOINTMENT (OUTPATIENT)
Dept: PHYSICAL THERAPY | Facility: HOSPITAL | Age: 63
End: 2023-01-24
Attending: Other
Payer: COMMERCIAL

## 2023-01-25 ENCOUNTER — APPOINTMENT (OUTPATIENT)
Dept: HEMATOLOGY/ONCOLOGY | Facility: HOSPITAL | Age: 63
End: 2023-01-25
Attending: INTERNAL MEDICINE
Payer: COMMERCIAL

## 2023-01-26 ENCOUNTER — APPOINTMENT (OUTPATIENT)
Dept: PHYSICAL THERAPY | Facility: HOSPITAL | Age: 63
End: 2023-01-26
Attending: Other
Payer: COMMERCIAL

## 2023-01-30 ENCOUNTER — APPOINTMENT (OUTPATIENT)
Dept: PHYSICAL THERAPY | Facility: HOSPITAL | Age: 63
End: 2023-01-30
Attending: Other
Payer: COMMERCIAL

## 2023-01-31 ENCOUNTER — APPOINTMENT (OUTPATIENT)
Dept: PHYSICAL THERAPY | Facility: HOSPITAL | Age: 63
End: 2023-01-31
Attending: Other
Payer: COMMERCIAL

## 2023-02-01 ENCOUNTER — APPOINTMENT (OUTPATIENT)
Dept: PHYSICAL THERAPY | Facility: HOSPITAL | Age: 63
End: 2023-02-01
Attending: Other
Payer: COMMERCIAL

## 2023-02-02 ENCOUNTER — APPOINTMENT (OUTPATIENT)
Dept: PHYSICAL THERAPY | Facility: HOSPITAL | Age: 63
End: 2023-02-02
Attending: Other
Payer: COMMERCIAL

## 2023-03-06 RX ORDER — LEVOTHYROXINE SODIUM 0.05 MG/1
TABLET ORAL
Qty: 90 TABLET | Refills: 0 | OUTPATIENT
Start: 2023-03-06

## 2023-04-14 ENCOUNTER — APPOINTMENT (OUTPATIENT)
Dept: HEMATOLOGY/ONCOLOGY | Facility: HOSPITAL | Age: 63
End: 2023-04-14
Attending: INTERNAL MEDICINE
Payer: COMMERCIAL

## 2023-07-17 NOTE — PROGRESS NOTES
Last office visit date: 1/21/23    Next appointment scheduled?: Yes     Number of refills given: 2   Pt here for research study: C1D2 etoposide. Also has radiation. Per note in tx plan \"MD visit with labs visit in 1 week.  (No onc MD/APN visit unless no Rad onc MD visit scheduled already)\"  Has already scheduled lab and exam with Dr. Mera Jacobs on 3/1 (0) independent

## 2023-10-18 NOTE — TELEPHONE ENCOUNTER
Bed: ED05  Expected date:   Expected time:   Means of arrival:   Comments:  Journey patient   Sure he can be seen earlier, theres a few available openings this week. Message text      -Above message recspring'd from [facility]. -BAKARI secured for 6/9/21 @ 5:30 pm.    -Encounter complete.

## 2025-06-19 NOTE — PROGRESS NOTES
Study: [facility]  Patient HP:--[id number]  Treatment Arm: Arm 2   Timepoint: unscheduled phone call     Called patient to check on diarrhea. Patient states it is not worse and knows to call if worsens.
English

## (undated) DEVICE — 3M™ TEGADERM™ TRANSPARENT FILM DRESSING, 1626W, 4 IN X 4-3/4 IN (10 CM X 12 CM), 50 EACH/CARTON, 4 CARTON/CASE: Brand: 3M™ TEGADERM™

## (undated) DEVICE — PEN SKIN MARKING REG TIP VIOLT

## (undated) DEVICE — IRRIGATION SUCTION CASSETTE: Brand: SONOPET IQ

## (undated) DEVICE — PROXIMATE SKIN STAPLERS (35 WIDE) CONTAINS 35 STAINLESS STEEL STAPLES (FIXED HEAD): Brand: PROXIMATE

## (undated) DEVICE — ISOPROPYL ALCOHOL 70% 4OZ BTL

## (undated) DEVICE — 2.3MM SPIRAL ROUTER

## (undated) DEVICE — REM POLYHESIVE ADULT PATIENT RETURN ELECTRODE: Brand: VALLEYLAB

## (undated) DEVICE — 3M™ IOBAN™ 2 ANTIMICROBIAL INCISE DRAPE 6648EZ: Brand: IOBAN™ 2

## (undated) DEVICE — RANEY SCALP CLIP STERILE: Brand: AESCULAP

## (undated) DEVICE — SUTURE CHROMIC GUT 5-0 P-3

## (undated) DEVICE — CODMAN® INTEGRATED BIPOLAR CORD AND TUBING SET FLYING LEADS, ROTARY PUMP: Brand: CODMAN®

## (undated) DEVICE — CYSTO PACK: Brand: MEDLINE INDUSTRIES, INC.

## (undated) DEVICE — CONVERTORS STOCKINETTE: Brand: CONVERTORS

## (undated) DEVICE — LIGHT HANDLE

## (undated) DEVICE — 12CM IQ STANDARD: Brand: SONOPET IQ

## (undated) DEVICE — UROLOGY DRAIN BAG

## (undated) DEVICE — SUT PLAIN GUT 5-0 PC-1 1915G

## (undated) DEVICE — GAUZE SPONGES,12 PLY: Brand: CURITY

## (undated) DEVICE — SUT VICRYL 2-0 CP-2 J762D

## (undated) DEVICE — Device: Brand: INTELLICART™

## (undated) DEVICE — MARKER PASSIVE NAVIGATION

## (undated) DEVICE — GOWN,SIRUS,FABRIC-REINFORCED,X-LARGE: Brand: MEDLINE

## (undated) DEVICE — SUT VICRYL 0 CT-1 J470D

## (undated) DEVICE — CODMAN® DISPOSABLE PERFORATOR 14MM: Brand: CODMAN®

## (undated) DEVICE — FLOSEAL HEMOSTATIC MATRIX, 5ML: Brand: FLOSEAL HEMOSTATIC MATRIX

## (undated) DEVICE — SOL  .9 1000ML BTL

## (undated) DEVICE — BANDAGE ROLL,100% COTTON, 6 PLY, LARGE: Brand: KERLIX

## (undated) DEVICE — SOFTWARE SPHR MED

## (undated) DEVICE — STERILE POLYISOPRENE POWDER-FREE SURGICAL GLOVES: Brand: PROTEXIS

## (undated) DEVICE — STERILE-Z MAYO STAND COVERS CLEAR POLYETHYLENE STERILE UNIVERSAL FIT 20 PER CASE: Brand: STERILE-Z

## (undated) DEVICE — STERILE POLYISOPRENE POWDER-FREE SURGICAL GLOVES WITH EMOLLIENT COATING: Brand: PROTEXIS

## (undated) DEVICE — COTTON BALLS-STRUNG 1/4": Brand: COTTON BALLS

## (undated) DEVICE — SOL  .9 3000ML

## (undated) DEVICE — DISPOSABLE DUAL IRRIGATING BIPOLAR FORCEPS, NON-STICK,: Brand: SPETZLER-MALIS

## (undated) DEVICE — SPECIMEN CONTAINER,POSITIVE SEAL INDICATOR, OR PACKAGED: Brand: PRECISION

## (undated) DEVICE — 3.0MM PRECISION NEURO (MATCH HEAD)

## (undated) DEVICE — SOLUTION  .9 1000ML BTL

## (undated) DEVICE — SLEEVE KENDALL SCD EXPRESS MED

## (undated) DEVICE — BUNDLE,LISTER BANDAGE SCISSORS,4-1/2", SILVER, 3/PKG: Brand: ADC

## (undated) DEVICE — SUT NUROLON 4-0 TF C584D

## (undated) DEVICE — DRILL SRG OIL CRTDG MAESTRO

## (undated) DEVICE — PAD SACRAL PREMIUM 12X12X1

## (undated) DEVICE — COVER PROBE GELFREE LG SFRSNC

## (undated) DEVICE — GAMMEX® PI HYBRID SIZE 7.5, STERILE POWDER-FREE SURGICAL GLOVE, POLYISOPRENE AND NEOPRENE BLEND: Brand: GAMMEX

## (undated) DEVICE — USE ITEM #176901

## (undated) DEVICE — WAX BONE 2.5G NONABSORBABLE

## (undated) DEVICE — SUT PROLENE 10-0 BV100-3 2794G

## (undated) DEVICE — BIPOLAR IRRIGATOR INTEGRATED TUBING AND BIPOLAR CORD SET, DISPOSABLE

## (undated) DEVICE — MICRO COVER: Brand: UNBRANDED

## (undated) DEVICE — 3M(TM) TEGADERM(TM) TRANSPARENT FILM DRESSING FRAME STYLE 9505W: Brand: 3M™ TEGADERM™

## (undated) DEVICE — OCCLUSIVE GAUZE STRIP OVERWRAP,3% BISMUTH TRIBROMOPHENATE IN PETROLATUM BLEND: Brand: XEROFORM

## (undated) DEVICE — SUT PROLENE 6-0 BV-1 8709H

## (undated) DEVICE — CRANIOTOMY CDS: Brand: MEDLINE INDUSTRIES, INC.

## (undated) DEVICE — MEDI-VAC NON-CONDUCTIVE SUCTION TUBING: Brand: CARDINAL HEALTH

## (undated) DEVICE — ELECTRODE ESURG 2.75IN EZ CLN

## (undated) DEVICE — DRAPE MICROSCOPE OMNI NEURO

## (undated) DEVICE — TIDISHIELD SURGICAL PATIENT DRAPE WITH INVASIVE APERTURES BLUE STERILE UNIVERSAL NAVIGATIONAL CRANIOTOMY 8 PER CASE: Brand: TIDISHIELD

## (undated) NOTE — LETTER
[facility], [place]  Authorization for Invasive Procedures  1.  I hereby authorize Dr. Jack Marrero , my physician and whomever may be designated as the doctor's assistant, to perform the following operation and/or procedure:  Chest [facility] performed for the purposes of advancing medicine, science, and/or education, provided my identity is not revealed. If the procedure has been videotaped, the physician/surgeon will obtain the original videotape.  The hospital will not be responsible for stor My signature below affirms that prior to the time of the procedure, I have explained to the patient and/or his legal representative, the risks and benefits involved in the proposed treatment and any reasonable alternative to the proposed treatment.  I have

## (undated) NOTE — MR AVS SNAPSHOT
After Visit Summary   2/18/2021    Daniel Chang. MRN: [id number]           Visit Information     Date & Time  2/18/2021  8:30 AM Provider  BHAVIK Painting Dept.  Phone  [phone] [facility] Hematology Oncology    4/20/2021 8:30 AM EM CC INFRN [address] - Infusion    4/21/2021 8:30 AM EM CC INFRN [address] - Infusion    4/22/2021 8:30 AM EM CC INFRN 3 Stacey Durbin. Regan illness   or injury that are   non-life-threatening.   Also available by appointment     Average cost  $70*       [address]   Monday – Friday  10:00 am – 10:00 pm   Saturday –

## (undated) NOTE — LETTER
2022    RE: Lane Dickens. : 1960    Dear Dr. Ashlyn Ward,    This letter is to inform you that your patient is being scheduled for surgery with Dr. Brittaney Cardona on 2022 at [facility]. We have asked the patient to contact your office to schedule a pre-operative exam/visit. Diagnosis: Cerebellar lesion G93.9  Procedure: Suboccipital Craniotomy    A Pre-operative History & Physical is needed for medical clearance. Please address patient's active problems (i.e high blood pressure, breathing issues etc.)  Pre-op labs are scheduled through the [facility] 56.. If any labs/testing are being done through the PCP office, then results should be faxed to the pre-admission testing department at [facility] [phone]. Our pre-operative lab orders are located in our surgery order, if the patient would like these done through your office, you will need to place separate orders. Please fax clearance letter/office visit note to the Pre-Admission department or our office at fax #: [phone]. Your office note must clearly indicate if the patient is medically cleared for surgery or not. The following orders will be placed by pre-admission testing:  CBC  Comprehensive Metabolic Panel  Type and Screen   PT/PTT/INR  MRSA/MSSA Nasal Swab  Covid-19 testing  CXR/EKG      If you have any questions, you may contact our office at [phone], option # 3.     Thank you,    ROMAIN Staff

## (undated) NOTE — LETTER
2022    RE: Luci Litten. : 1960    Dear Dr. Dustin Martinez,    This letter is to inform you that your patient is being scheduled for surgery with Dr. Alexandro Araujo on 2022 at [facility]. We have asked the patient to contact your office to schedule a pre-operative exam/visit. Diagnosis: Cerebellar lesion G93.9  Procedure: Suboccipital Craniotomy    A Pre-operative History & Physical is needed for medical clearance. Please address patient's active problems (i.e high blood pressure, breathing issues etc.)  Pre-op labs are scheduled through the [facility] 56.. If any labs/testing are being done through the PCP office, then results should be faxed to the pre-admission testing department at [facility] [phone]. Our pre-operative lab orders are located in our surgery order, if the patient would like these done through your office, you will need to place separate orders. Please fax clearance letter/office visit note to the Pre-Admission department or our office at fax #: [phone]. Your office note must clearly indicate if the patient is medically cleared for surgery or not. The following orders will be placed by pre-admission testing:  CBC  Comprehensive Metabolic Panel  Type and Screen   PT/PTT/INR  MRSA/MSSA Nasal Swab  Covid-19 testing  CXR/EKG      If you have any questions, you may contact our office at [phone], option # 3.     Thank you,    Shante Santiago., BSN, RN Clinical Nurse [address] [place] [address]  [address] [place] [address] Drive, [address]son, [address] [facility]  Phone [phone], Fax [phone]

## (undated) NOTE — Clinical Note
SONIA Hernandez,   I told Dee Dee Whitley that he has a low threshold to let us know if he's having any new neuro symptoms.  He was describing a \"dizziness\" symptom yesterday that didt really pan out to anything concerning on exam. Did full neuro exam with cerebellar

## (undated) NOTE — MR AVS SNAPSHOT
After Visit Summary   3/31/2020    Russ Kerns. MRN: [id number]           Visit Information     Date & Time  3/31/2020  8:00 AM Provider  EM [address] [facility] [place] [facility] Dept.  Phone  [phone] 5/11/2020 8:15 AM EM CC [address] - Infusion    5/11/2020 9:00 AM [facility] Hematology Oncology    5/12/2020 8:30 AM EM CC INFRN [address] - Infusion    6/1/2020 8:45 AM EM CC LAB2 Additional Information  If you have questions, you can call [phone] to talk to our [address] staff. Remember, [employer] is NOT to be used for urgent needs. For medical emergencies, dial 911.     Sincerely,    EM CC INFRN 5              [employer] now offers V Also available by appointment     Average cost  $70*       [address]   Monday – Friday  10:00 am – 10:00 pm   Saturday – Sunday  10:00 am – 4:00 pm     Zion Sifuentes   Monday –

## (undated) NOTE — LETTER
[address] [place] [address]  Authorization for Invasive Procedures  1.  I hereby authorize ___________________, my physician and whomever may be designated as the doctor's assistant, to perform the following operation and/or procedure: performed for the purposes of advancing medicine, science, and/or education, provided my identity is not revealed. If the procedure has been videotaped, the physician/surgeon will obtain the original videotape.  The hospital will not be responsible for stor My signature below affirms that prior to the time of the procedure, I have explained to the patient and/or his legal representative, the risks and benefits involved in the proposed treatment and any reasonable alternative to the proposed treatment.  I have

## (undated) NOTE — MR AVS SNAPSHOT
After Visit Summary   4/2/2020    [place] Pilling. MRN: [id number]           Visit Information     Date & Time  4/2/2020  7:30 AM Provider  EM CC INFRN [address] [place] [address] Dept.  Phone  [phone] to securely access your online medical record. e[employer]ark allows you to send messages to your doctor, view test results, renew prescriptions and request appointments. How Do I Sign Up? 1. In your Internet browser, go to http://[employer]. [facility]. c complete it and provide feedback. We strive to deliver the best patient experience and are looking for ways to make improvements. Your feedback will help us do so. For more information on [employer], please visit [email or web address] at a hospital emergency room. Average cost  $2,300*   *Cost varies based on your insurance coverage  For more information about hours, locations or appointment options available at [facility],   visit [facility].[email or web address]/ImmediateCare or call [phone].  (

## (undated) NOTE — MR AVS SNAPSHOT
After Visit Summary   3/12/2020    Quang Richards. MRN: [id number]           Visit Information     Date & Time  3/12/2020  8:30 AM Provider  EM [address] [place] [address] Dept.  Phone  [phone] messages to your doctor, view test results, renew prescriptions and request appointments. How Do I Sign Up? 1. In your Internet browser, go to [email or web address]://[email or web address]. [employer]. [employer]  2.  Click on the Activate your Account if you have an activation code i experience and are looking for ways to make improvements. Your feedback will help us do so. For more information on [employer], please visit [email or web address]                   DO YOU KNOW WHERE TO GO?   Injury & Illness are neve For more information about hours, locations or appointment options available at [facility],   visit [email or web address] Group/ImmediateCare or call [phone]. ([phone])

## (undated) NOTE — MR AVS SNAPSHOT
After Visit Summary   3/11/2021    Dany Garcia. MRN: [id number]           Visit Information     Date & Time  3/11/2021  8:30 AM Provider   [address] [address] [address] Dept.  Phone  [phone] [facility] Hematology Oncology    4/20/2021 8:30 AM EM CC INFRN [address] - Infusion    4/21/2021 8:30 AM EM CC INFRN [address] - Infusion    4/22/2021 8:30 AM EM CC INFRN Amie 2 non-life-threatening.   Also available by appointment     Average cost  $70*       [address]   Monday – Friday  10:00 am – 10:00 pm   Saturday – Sunday  10:00 am – 4:00 pm

## (undated) NOTE — LETTER
Printed: 2020    Patient Name: Deborah Medrano. : 1960   Medical Record #: [id number]    Consent to Cancer Treatment    I, Deborahebonie Medrano., understand that I have been diagnosed with Small Cell Lung Cancer, limited stage. treatment at any time. I have had the chance to ask questions about this treatment, and my questions have been answered to my satisfaction.   I understand that I can contact my oncologist, Fernando Cabrera MD, or my Cancer Care Team at any time if I have ques

## (undated) NOTE — MR AVS SNAPSHOT
After Visit Summary   4/21/2020    Ynes Rock. MRN: [id number]           Visit Information     Date & Time  4/21/2020  7:30 AM Provider  EM CC INFRN 1 [facility] Dept.  Phone  [phone] Thank you for enrolling in [address] Please follow the instructions below to securely access your online medical record. [employer] allows you to send messages to your doctor, view test results, renew prescriptions and request appointments.     How Do I Sign Up If you receive a survey from [employer], please take a few minutes to complete it and provide feedback. We strive to deliver the best patient experience and are looking for ways to make improvements. Your feedback will help us do so.  For more information EMERGENCY ROOM Life-threatening emergencies needing immediate intervention at a hospital emergency room.  Average cost  $2,300*   *Cost varies based on your insurance coverage  For more information about hours, locations or appointment options available at

## (undated) NOTE — MR AVS SNAPSHOT
After Visit Summary   3/10/2021    Estefany Alvarado. MRN: [id number]           Visit Information     Date & Time  3/10/2021  8:30 AM Provider   [address] [address] [address] Dept.  Phone  [phone] [place] - Infusion    4/19/2021 8:15 AM EM CC LAB2 [address] - Infusion    4/19/2021 9:15 AM Alpesh Arriola [address] Hematology Oncology    4/20/2021 8:30 AM EM CC INFRN [address] [place] [address] [place]  Monday – Friday  8:00 am – 8:00 pm   Saturday – Sunday  8:00 am – 4:00 pm    WALK-IN CARE  Primary Care Providers  Treatment for acute illness   or injury that are   non-life-threatening.   Also available by appointment     Average cost  $70*

## (undated) NOTE — MR AVS SNAPSHOT
After Visit Summary   2/20/2020    Gilmer Whiteeldaaminah. MRN: [id number]           Visit Information     Date & Time  2/20/2020  7:30 AM Provider  EM CC INFRN [address] [address] Dept.  Phone  [phone] Dear Андрей Downing : Thank you for enrolling in [address] Please follow the instructions below to securely access your online medical record. [employer]ah allows you to send messages to your doctor, view test results, renew prescriptions and request appointments. If you receive a survey from [employer], please take a few minutes to complete it and provide feedback. We strive to deliver the best patient experience and are looking for ways to make improvements. Your feedback will help us do so.  For more information EMERGENCY ROOM Life-threatening emergencies needing immediate intervention at a hospital emergency room.  Average cost  $2,300*   *Cost varies based on your insurance coverage  For more information about hours, locations or appointment options available at

## (undated) NOTE — LETTER
1/20/2020              [address] [place] [address]         Dear Aleisha Golden,    This letter is to inform you that our office has made several attempts to reach you by phone without success.   We were attempting to contact

## (undated) NOTE — LETTER
[address] [place] [address]  Authorization for Invasive Procedures  1.  I hereby authorize  _______________________ , my physician and whomever may be designated as the doctor's assistant, to perform the following operation and/or proc 5. I consent to the photographing of the operations or procedures to be performed for the purposes of advancing medicine, science, and/or education, provided my identity is not revealed.  If the procedure has been videotaped, the physician/surgeon will obta __________ Time: ___________    Statement of Physician  My signature below affirms that prior to the time of the procedure, I have explained to the patient and/or his legal representative, the risks and benefits involved in the proposed treatment and any r

## (undated) NOTE — MR AVS SNAPSHOT
After Visit Summary   8/4/2020    [place] Pilling. MRN: [id number]           Visit Information     Date & Time  8/4/2020  8:30 AM Provider  EM CC [place] [address] [address] Dept.  Phone  [phone] [email or web address]                   DO YOU KNOW WHERE TO GO? Injury & Illness are never convenient. If you are dealing with a   non-emergency, consider your options before heading to an ER.   VIDEO VISITS  Visit face-to-face with visit [email or web address] or call [phone]. ([phone])

## (undated) NOTE — MR AVS SNAPSHOT
After Visit Summary   3/11/2020    Kely Harrison. MRN: [id number]           Visit Information     Date & Time  3/11/2020  7:30 AM Provider  EM [address] [place] [address] Road [address] [place] [address] Dept.  Phone  [phone] 4/2/2020 7:30 AM EM CC INFRN [address] - Infusion    4/7/2020 7:45 AM Claude Sequin; [address] [place] [address] RAD ONC RN [address]    4/14/2020 7:45 AM Larry Grant; [address] [place] [address] RAD ONC RN [address] If you have questions, you can call [phone] to talk to our [address] staff. Remember, [employer] is NOT to be used for urgent needs. For medical emergencies, dial 911.     Sincerely,    BHAVIK KOHLIN 6              [facility] now offers Video Visits through Hers Average cost  $70*       [facility] – [address]   Monday – Friday  10:00 am – 10:00 pm   Saturday – Sunday  10:00 am – 4:00 pm     Vania Fabian   Monday – Friday  4:00 pm – 10:00 pm   H[place]y